# Patient Record
Sex: MALE | Race: WHITE | NOT HISPANIC OR LATINO | Employment: FULL TIME | URBAN - METROPOLITAN AREA
[De-identification: names, ages, dates, MRNs, and addresses within clinical notes are randomized per-mention and may not be internally consistent; named-entity substitution may affect disease eponyms.]

---

## 2023-07-22 ENCOUNTER — APPOINTMENT (OUTPATIENT)
Dept: MRI IMAGING | Facility: CLINIC | Age: 48
DRG: 066 | End: 2023-07-22
Attending: EMERGENCY MEDICINE
Payer: COMMERCIAL

## 2023-07-22 ENCOUNTER — HOSPITAL ENCOUNTER (INPATIENT)
Facility: CLINIC | Age: 48
LOS: 1 days | Discharge: HOME | DRG: 066 | End: 2023-07-25
Attending: EMERGENCY MEDICINE | Admitting: HOSPITALIST
Payer: COMMERCIAL

## 2023-07-22 ENCOUNTER — APPOINTMENT (OUTPATIENT)
Dept: CT IMAGING | Facility: CLINIC | Age: 48
DRG: 066 | End: 2023-07-22
Attending: EMERGENCY MEDICINE
Payer: COMMERCIAL

## 2023-07-22 DIAGNOSIS — G45.9 TIA (TRANSIENT ISCHEMIC ATTACK): ICD-10-CM

## 2023-07-22 DIAGNOSIS — I63.9 CEREBROVASCULAR ACCIDENT (CVA), UNSPECIFIED MECHANISM (H): Primary | ICD-10-CM

## 2023-07-22 LAB
ALBUMIN SERPL BCG-MCNC: 4.5 G/DL (ref 3.5–5.2)
ALP SERPL-CCNC: 94 U/L (ref 40–129)
ALT SERPL W P-5'-P-CCNC: 33 U/L (ref 0–70)
ANION GAP SERPL CALCULATED.3IONS-SCNC: 13 MMOL/L (ref 7–15)
APTT PPP: 26 SECONDS (ref 22–38)
AST SERPL W P-5'-P-CCNC: 23 U/L (ref 0–45)
ATRIAL RATE - MUSE: 96 BPM
BASOPHILS # BLD AUTO: 0.1 10E3/UL (ref 0–0.2)
BASOPHILS NFR BLD AUTO: 1 %
BILIRUB SERPL-MCNC: 0.4 MG/DL
BUN SERPL-MCNC: 8.1 MG/DL (ref 6–20)
CALCIUM SERPL-MCNC: 9.8 MG/DL (ref 8.6–10)
CHLORIDE SERPL-SCNC: 105 MMOL/L (ref 98–107)
CREAT SERPL-MCNC: 1.14 MG/DL (ref 0.67–1.17)
DEPRECATED HCO3 PLAS-SCNC: 25 MMOL/L (ref 22–29)
DIASTOLIC BLOOD PRESSURE - MUSE: NORMAL MMHG
EOSINOPHIL # BLD AUTO: 0.2 10E3/UL (ref 0–0.7)
EOSINOPHIL NFR BLD AUTO: 2 %
ERYTHROCYTE [DISTWIDTH] IN BLOOD BY AUTOMATED COUNT: 13.2 % (ref 10–15)
ETHANOL SERPL-MCNC: <0.01 G/DL
GFR SERPL CREATININE-BSD FRML MDRD: 80 ML/MIN/1.73M2
GLUCOSE SERPL-MCNC: 80 MG/DL (ref 70–99)
HCT VFR BLD AUTO: 45.6 % (ref 40–53)
HGB BLD-MCNC: 15.9 G/DL (ref 13.3–17.7)
HOLD SPECIMEN: NORMAL
HOLD SPECIMEN: NORMAL
IMM GRANULOCYTES # BLD: 0 10E3/UL
IMM GRANULOCYTES NFR BLD: 0 %
INR PPP: 0.96 (ref 0.85–1.15)
INTERPRETATION ECG - MUSE: NORMAL
LYMPHOCYTES # BLD AUTO: 3 10E3/UL (ref 0.8–5.3)
LYMPHOCYTES NFR BLD AUTO: 32 %
MCH RBC QN AUTO: 31.9 PG (ref 26.5–33)
MCHC RBC AUTO-ENTMCNC: 34.9 G/DL (ref 31.5–36.5)
MCV RBC AUTO: 91 FL (ref 78–100)
MONOCYTES # BLD AUTO: 0.8 10E3/UL (ref 0–1.3)
MONOCYTES NFR BLD AUTO: 9 %
NEUTROPHILS # BLD AUTO: 5.2 10E3/UL (ref 1.6–8.3)
NEUTROPHILS NFR BLD AUTO: 56 %
NRBC # BLD AUTO: 0 10E3/UL
NRBC BLD AUTO-RTO: 0 /100
P AXIS - MUSE: 51 DEGREES
PLATELET # BLD AUTO: 281 10E3/UL (ref 150–450)
POTASSIUM SERPL-SCNC: 4 MMOL/L (ref 3.4–5.3)
PR INTERVAL - MUSE: 178 MS
PROT SERPL-MCNC: 6.9 G/DL (ref 6.4–8.3)
QRS DURATION - MUSE: 114 MS
QT - MUSE: 354 MS
QTC - MUSE: 447 MS
R AXIS - MUSE: -73 DEGREES
RBC # BLD AUTO: 4.99 10E6/UL (ref 4.4–5.9)
SODIUM SERPL-SCNC: 143 MMOL/L (ref 136–145)
SYSTOLIC BLOOD PRESSURE - MUSE: NORMAL MMHG
T AXIS - MUSE: 52 DEGREES
TROPONIN T SERPL HS-MCNC: <6 NG/L
TSH SERPL DL<=0.005 MIU/L-ACNC: 2.32 UIU/ML (ref 0.3–4.2)
VENTRICULAR RATE- MUSE: 96 BPM
WBC # BLD AUTO: 9.3 10E3/UL (ref 4–11)

## 2023-07-22 PROCEDURE — 70496 CT ANGIOGRAPHY HEAD: CPT

## 2023-07-22 PROCEDURE — G0378 HOSPITAL OBSERVATION PER HR: HCPCS

## 2023-07-22 PROCEDURE — 84443 ASSAY THYROID STIM HORMONE: CPT | Performed by: HOSPITALIST

## 2023-07-22 PROCEDURE — 83036 HEMOGLOBIN GLYCOSYLATED A1C: CPT | Performed by: HOSPITALIST

## 2023-07-22 PROCEDURE — 99207 PR NO BILLABLE SERVICE THIS VISIT: CPT | Performed by: INTERNAL MEDICINE

## 2023-07-22 PROCEDURE — A9585 GADOBUTROL INJECTION: HCPCS | Performed by: EMERGENCY MEDICINE

## 2023-07-22 PROCEDURE — 0042T CT HEAD PERFUSION W CONTRAST: CPT

## 2023-07-22 PROCEDURE — 82077 ASSAY SPEC XCP UR&BREATH IA: CPT | Performed by: EMERGENCY MEDICINE

## 2023-07-22 PROCEDURE — 70553 MRI BRAIN STEM W/O & W/DYE: CPT

## 2023-07-22 PROCEDURE — 250N000009 HC RX 250: Performed by: EMERGENCY MEDICINE

## 2023-07-22 PROCEDURE — 255N000002 HC RX 255 OP 636: Performed by: EMERGENCY MEDICINE

## 2023-07-22 PROCEDURE — 85025 COMPLETE CBC W/AUTO DIFF WBC: CPT | Performed by: EMERGENCY MEDICINE

## 2023-07-22 PROCEDURE — 36415 COLL VENOUS BLD VENIPUNCTURE: CPT | Performed by: EMERGENCY MEDICINE

## 2023-07-22 PROCEDURE — 99285 EMERGENCY DEPT VISIT HI MDM: CPT | Mod: 25

## 2023-07-22 PROCEDURE — 250N000011 HC RX IP 250 OP 636: Performed by: EMERGENCY MEDICINE

## 2023-07-22 PROCEDURE — 80061 LIPID PANEL: CPT | Performed by: HOSPITALIST

## 2023-07-22 PROCEDURE — 85730 THROMBOPLASTIN TIME PARTIAL: CPT | Performed by: EMERGENCY MEDICINE

## 2023-07-22 PROCEDURE — 80053 COMPREHEN METABOLIC PANEL: CPT | Performed by: EMERGENCY MEDICINE

## 2023-07-22 PROCEDURE — 70450 CT HEAD/BRAIN W/O DYE: CPT

## 2023-07-22 PROCEDURE — 99223 1ST HOSP IP/OBS HIGH 75: CPT | Performed by: HOSPITALIST

## 2023-07-22 PROCEDURE — 70498 CT ANGIOGRAPHY NECK: CPT

## 2023-07-22 PROCEDURE — 84484 ASSAY OF TROPONIN QUANT: CPT | Performed by: EMERGENCY MEDICINE

## 2023-07-22 PROCEDURE — 85610 PROTHROMBIN TIME: CPT | Performed by: EMERGENCY MEDICINE

## 2023-07-22 RX ORDER — ATORVASTATIN CALCIUM 40 MG/1
40 TABLET, FILM COATED ORAL EVERY EVENING
Status: DISCONTINUED | OUTPATIENT
Start: 2023-07-22 | End: 2023-07-22

## 2023-07-22 RX ORDER — ASPIRIN 81 MG/1
81 TABLET ORAL DAILY
Status: DISCONTINUED | OUTPATIENT
Start: 2023-07-22 | End: 2023-07-22

## 2023-07-22 RX ORDER — IOPAMIDOL 755 MG/ML
100 INJECTION, SOLUTION INTRAVASCULAR ONCE
Status: COMPLETED | OUTPATIENT
Start: 2023-07-22 | End: 2023-07-22

## 2023-07-22 RX ORDER — GADOBUTROL 604.72 MG/ML
8 INJECTION INTRAVENOUS ONCE
Status: COMPLETED | OUTPATIENT
Start: 2023-07-22 | End: 2023-07-22

## 2023-07-22 RX ORDER — ASPIRIN 81 MG/1
81 TABLET, CHEWABLE ORAL DAILY
Status: DISCONTINUED | OUTPATIENT
Start: 2023-07-22 | End: 2023-07-25 | Stop reason: HOSPADM

## 2023-07-22 RX ORDER — ATORVASTATIN CALCIUM 40 MG/1
40 TABLET, FILM COATED ORAL EVERY EVENING
Status: DISCONTINUED | OUTPATIENT
Start: 2023-07-22 | End: 2023-07-25 | Stop reason: HOSPADM

## 2023-07-22 RX ORDER — CLOPIDOGREL BISULFATE 75 MG/1
75 TABLET ORAL DAILY
Status: DISCONTINUED | OUTPATIENT
Start: 2023-07-23 | End: 2023-07-25 | Stop reason: HOSPADM

## 2023-07-22 RX ORDER — CLOPIDOGREL 300 MG/1
300 TABLET, FILM COATED ORAL ONCE
Status: COMPLETED | OUTPATIENT
Start: 2023-07-22 | End: 2023-07-23

## 2023-07-22 RX ADMIN — SODIUM CHLORIDE 100 ML: 9 INJECTION, SOLUTION INTRAVENOUS at 19:28

## 2023-07-22 RX ADMIN — IOPAMIDOL 100 ML: 755 INJECTION, SOLUTION INTRAVENOUS at 19:27

## 2023-07-22 RX ADMIN — GADOBUTROL 8 ML: 604.72 INJECTION INTRAVENOUS at 21:46

## 2023-07-22 ASSESSMENT — ACTIVITIES OF DAILY LIVING (ADL)
ADLS_ACUITY_SCORE: 33
ADLS_ACUITY_SCORE: 35
ADLS_ACUITY_SCORE: 35

## 2023-07-22 NOTE — ED TRIAGE NOTES
Patient presents with complaints of intermittent right hand numbness and word finding difficulty that started around 1500 today. Patient is not currently having any symptoms at the time of this note. Patient is a daily smoker and uses alcohol 3-4 days per week. Patient has no focal neurological deficits on initial assessment.      Triage Assessment     Row Name 07/22/23 8410       Triage Assessment (Adult)    Airway WDL WDL       Respiratory WDL    Respiratory WDL WDL       Skin Circulation/Temperature WDL    Skin Circulation/Temperature WDL WDL       Cardiac WDL    Cardiac WDL WDL       Peripheral/Neurovascular WDL    Peripheral Neurovascular WDL WDL       Cognitive/Neuro/Behavioral WDL    Cognitive/Neuro/Behavioral WDL WDL

## 2023-07-23 LAB
CHOLEST SERPL-MCNC: 195 MG/DL
FACTOR 2 INTERPRETATION: NORMAL
FACTOR V INTERPRETATION: NORMAL
GLUCOSE BLDC GLUCOMTR-MCNC: 116 MG/DL (ref 70–99)
GLUCOSE BLDC GLUCOMTR-MCNC: 92 MG/DL (ref 70–99)
GLUCOSE BLDC GLUCOMTR-MCNC: 96 MG/DL (ref 70–99)
HBA1C MFR BLD: 5.1 %
HDLC SERPL-MCNC: 41 MG/DL
LAB DIRECTOR COMMENTS: NORMAL
LAB DIRECTOR DISCLAIMER: NORMAL
LAB DIRECTOR INTERPRETATION: NORMAL
LAB DIRECTOR METHODOLOGY: NORMAL
LAB DIRECTOR RESULTS: NORMAL
LDLC SERPL CALC-MCNC: 103 MG/DL
NONHDLC SERPL-MCNC: 154 MG/DL
SPECIMEN DESCRIPTION: NORMAL
TRIGL SERPL-MCNC: 256 MG/DL
TROPONIN T SERPL HS-MCNC: <6 NG/L

## 2023-07-23 PROCEDURE — G0378 HOSPITAL OBSERVATION PER HR: HCPCS

## 2023-07-23 PROCEDURE — 250N000013 HC RX MED GY IP 250 OP 250 PS 637: Performed by: HOSPITALIST

## 2023-07-23 PROCEDURE — 36415 COLL VENOUS BLD VENIPUNCTURE: CPT | Performed by: PHYSICIAN ASSISTANT

## 2023-07-23 PROCEDURE — 36415 COLL VENOUS BLD VENIPUNCTURE: CPT | Performed by: HOSPITALIST

## 2023-07-23 PROCEDURE — 250N000013 HC RX MED GY IP 250 OP 250 PS 637: Performed by: EMERGENCY MEDICINE

## 2023-07-23 PROCEDURE — 99222 1ST HOSP IP/OBS MODERATE 55: CPT | Performed by: PHYSICIAN ASSISTANT

## 2023-07-23 PROCEDURE — 86147 CARDIOLIPIN ANTIBODY EA IG: CPT | Performed by: PHYSICIAN ASSISTANT

## 2023-07-23 PROCEDURE — 85300 ANTITHROMBIN III ACTIVITY: CPT | Performed by: PHYSICIAN ASSISTANT

## 2023-07-23 PROCEDURE — 86146 BETA-2 GLYCOPROTEIN ANTIBODY: CPT | Performed by: PHYSICIAN ASSISTANT

## 2023-07-23 PROCEDURE — 85730 THROMBOPLASTIN TIME PARTIAL: CPT | Performed by: PHYSICIAN ASSISTANT

## 2023-07-23 PROCEDURE — 999N000111 HC STATISTIC OT IP EVAL DEFER

## 2023-07-23 PROCEDURE — G0452 MOLECULAR PATHOLOGY INTERPR: HCPCS | Mod: 26 | Performed by: PATHOLOGY

## 2023-07-23 PROCEDURE — 85306 CLOT INHIBIT PROT S FREE: CPT | Performed by: PHYSICIAN ASSISTANT

## 2023-07-23 PROCEDURE — 84484 ASSAY OF TROPONIN QUANT: CPT | Performed by: HOSPITALIST

## 2023-07-23 PROCEDURE — 85390 FIBRINOLYSINS SCREEN I&R: CPT | Mod: 26 | Performed by: PATHOLOGY

## 2023-07-23 PROCEDURE — 999N000226 HC STATISTIC SLP IP EVAL DEFER

## 2023-07-23 PROCEDURE — 85303 CLOT INHIBIT PROT C ACTIVITY: CPT | Performed by: PHYSICIAN ASSISTANT

## 2023-07-23 PROCEDURE — 82962 GLUCOSE BLOOD TEST: CPT

## 2023-07-23 PROCEDURE — 81240 F2 GENE: CPT | Performed by: PHYSICIAN ASSISTANT

## 2023-07-23 RX ORDER — LIDOCAINE 40 MG/G
CREAM TOPICAL
Status: DISCONTINUED | OUTPATIENT
Start: 2023-07-23 | End: 2023-07-25 | Stop reason: HOSPADM

## 2023-07-23 RX ADMIN — ATORVASTATIN CALCIUM 40 MG: 40 TABLET, FILM COATED ORAL at 00:08

## 2023-07-23 RX ADMIN — CLOPIDOGREL BISULFATE 75 MG: 75 TABLET ORAL at 09:07

## 2023-07-23 RX ADMIN — ASPIRIN 81 MG 81 MG: 81 TABLET ORAL at 09:07

## 2023-07-23 RX ADMIN — ASPIRIN 81 MG 81 MG: 81 TABLET ORAL at 00:08

## 2023-07-23 RX ADMIN — ATORVASTATIN CALCIUM 40 MG: 40 TABLET, FILM COATED ORAL at 19:47

## 2023-07-23 RX ADMIN — CLOPIDOGREL BISULFATE 300 MG: 300 TABLET, FILM COATED ORAL at 00:07

## 2023-07-23 ASSESSMENT — ACTIVITIES OF DAILY LIVING (ADL)
ADLS_ACUITY_SCORE: 31

## 2023-07-23 NOTE — PLAN OF CARE
"SLP: Orders received. Chart reviewed and discussed with care team.  SLP not indicated as pt has passed a nurse swallow screen and he reports feeling \"mostly\" back to normal in regards to language. During our conversation he does not present with aphasia or dysarthria. However, he admits the language demands here haven't been high.     We discussed recommendation for OP SLP consult should pt identify further language concerns upon discharge and he is agreeable to this plan. No need for inpatient SLP evaluation.  Defer discharge recommendations to medical providers.  Will complete orders.      "

## 2023-07-23 NOTE — CONSULTS
[facility]    Stroke Consult Note    Reason for Consult:  stroke    Chief Complaint: Stroke Symptoms       HPI  Mundo Kern is a 48 year old male with PMH tobacco use, has not seen a doctor in >10 years, presenting with R hand numbness and clumsiness onset 1400 yesterday afternoon. Also noted a brief episode of loss of balance and dizziness. Symtpoms had resolved by the time of evaluation in the ED. Not a TNK candidate given resolution of symptoms and presentation outside the conventional treatment window.     He denies similar symptoms in the past. States the RUE feels back to normal, denies any involvement of other extremities or speech changes.    Stroke Evaluation Summarized    MRI/Head CT MRI: acute/early subacute small L MCA territory infarcts   Intracranial Vasculature CTA head: no LVO, no significant stenosis   Cervical Vasculature CTA neck: no significant stenosis     Echocardiogram pending   EKG/Telemetry SR   Other Testing CTP: Symmetric cerebral blood flow and volume to the cerebral hemispheres bilaterally but there is a mild delay in mean transit time to the posterior left frontal lobe.     LDL  7/22/2023: 103 mg/dL   A1C  7/22/2023: 5.1 %   Troponin 7/23/2023: <6 ng/L       Impression  Scattered acute ischemic strokes of L MCA territory due to embolic stroke of undetermined source (ESUS)      Recommendations   - Neurochecks and Vital Signs every 4 hours   - Permissive HTN; goal SBP < 220 mmHg  - Long term outpatient BP Goal <130/80  - DAPT x21 days with daily aspirin 81 mg + Plavix 75 mg followed by aspirin 325 mg daily monotherapy for secondary stroke prevention  - Statin: atorvastatin 40 mg, titrated to LDL goal 40-70  - TTE with Bubble Study>pending results may need MARIA TERESA  - Telemetry, EKG  - discharge with 30 day cardiac monitor to eval for atrial fibrillation (ordered)  - Hypercoagulable labs ordered given young age  - Bedside Glucose Monitoring  - Nutrition: per  "nursing  - PT/OT/SLP  - Stroke Education  - Stroke Class per [facility]  - Smoking Cessation discussed - he states he is determined to quit  - Depression Screen  - Euthermia, Euglycemia      Patient Follow-up    - in the next 1-2 week(s) with PCP - needs to establish care  - in 6-8 weeks with general neurology ([phone]) (ordered)    Thank you for this consult. Will continue to follow.    Shayy David PA-C  Vascular Neurology    To page me or covering stroke neurology team member, click here: [facility]  Choose \"On Call\" tab at top, then select \"NEUROLOGY/ALL SITES\" from middle drop-down box, press Enter, then look for \"stroke\" or \"telestroke\" for your site.    _____________________________________________________    Clinically Significant Risk Factors Present on Admission                       # Overweight: Estimated body mass index is 25.1 kg/m  as calculated from the following:    Height as of this encounter: 1.803 m (5' 11\").    Weight as of this encounter: 81.6 kg (180 lb).         Past Medical History    No past medical history on file.  Medications   Home Meds  Prior to Admission medications    Not on File       Scheduled Meds    aspirin  81 mg Oral Daily     atorvastatin  40 mg Oral QPM     clopidogrel  75 mg Oral Daily     sodium chloride (PF)  3 mL Intracatheter Q8H       Infusion Meds    - MEDICATION INSTRUCTIONS -       - MEDICATION INSTRUCTIONS -         Allergies   No Known Allergies       PHYSICAL EXAMINATION   Temp:  [97.7  F (36.5  C)-98.2  F (36.8  C)] 97.8  F (36.6  C)  Pulse:  [70-95] 74  Resp:  [14-25] 16  BP: (120-148)/() 120/88  SpO2:  [95 %-98 %] 96 %    General Exam  General:  Patient sitting at the edge of the bed without any acute distress    HEENT:  normocephalic/atraumatic  Pulmonary:  no respiratory distress    Neuro Exam  Mental Status:  alert, oriented x 3, follows commands, speech clear and fluent, naming and repetition normal  Cranial Nerves:  visual fields " intact, PERRL, EOMI with normal smooth pursuit, facial sensation intact and symmetric, facial movements symmetric, hearing not formally tested but intact to conversation, palate elevation symmetric and uvula midline, no dysarthria, shoulder shrug strong bilaterally, tongue protrusion midline  Motor:  normal muscle tone and bulk, no abnormal movements, able to move all limbs spontaneously, strength 5/5 throughout upper and lower extremities, no pronator drift  Reflexes:  toes down-going  Sensory:  light touch sensation intact and symmetric throughout upper and lower extremities, no extinction on double simultaneous stimulation   Coordination:  normal finger-to-nose and heel-to-shin bilaterally without dysmetria, rapid alternating movements symmetric  Station/Gait:  deferred    Stroke Scales    NIHSS  1a. Level of Consciousness 0-->Alert, keenly responsive   1b. LOC Questions 0-->Answers both questions correctly   1c. LOC Commands 0-->Performs both tasks correctly   2.   Best Gaze 0-->Normal   3.   Visual 0-->No visual loss   4.   Facial Palsy 0-->Normal symmetrical movements   5a. Motor Arm, Left 0-->No drift, limb holds 90 (or 45) degrees for full 10 secs   5b. Motor Arm, Right 0-->No drift, limb holds 90 (or 45) degrees for full 10 secs   6a. Motor Leg, Left 0-->No drift, leg holds 30 degree position for full 5 secs   6b. Motor Leg, right 0-->No drift, leg holds 30 degree position for full 5 secs   7.   Limb Ataxia 0-->Absent   8.   Sensory 0-->Normal, no sensory loss   9.   Best Language 0-->No aphasia, normal   10. Dysarthria 0-->Normal   11. Extinction and Inattention  0-->No abnormality   Total 0 (07/23/23 1155)       Imaging  I personally reviewed all imaging; relevant findings per HPI.    Labs Data   CBC  Recent Labs   Lab 07/22/23 1914   WBC 9.3   RBC 4.99   HGB 15.9   HCT 45.6        Basic Metabolic Panel   Recent Labs   Lab 07/22/23 1914      POTASSIUM 4.0   CHLORIDE 105   CO2 25   BUN 8.1    CR 1.14   GLC 80   SOFI 9.8     Liver Panel  Recent Labs   Lab 07/22/23 1914   PROTTOTAL 6.9   ALBUMIN 4.5   BILITOTAL 0.4   ALKPHOS 94   AST 23   ALT 33     INR    Recent Labs   Lab Test 07/22/23 1914   INR 0.96           Stroke Consult Data Data   This was a non-emergent, non-telestroke consult.  I have personally spent a total of 60 minutes providing care today, time spent in reviewing medical records and reviewing tests, examining the patient and obtaining history, coordination of care, and discussion with the patient and/or family regarding diagnostic results, prognosis, symptom management, risks and benefits of management options, and development of plan of care. Greater than 50% was spent in counseling and coordination of care.

## 2023-07-23 NOTE — CONSULTS
"[facility]    Stroke Consult Note    Reason for Consult: Stroke Code     Chief Complaint: Stroke Symptoms      HPI  Mundo Kern is a 48 year old male with no prior medical problems coming with right hand weakness that started at 1400. He reports that that his right hand felt strange. He also had some speech problems- expressing how he felt etc. Symptoms have resolved in the ED.    He does report smoking a pack a day.    Imaging Findings  CT head- no acute IC changes.   CTA head and neck- no LVO  CTP- left m3 division perfusion defect.    Intravenous Thrombolysis  Not given due to:   - minor/isolated/quickly resolving symptoms    Endovascular Treatment  Not initiated due to absence of proximal vessel occlusion    Impression   Acute ischemic stroke of left MCA territory due to undetermined etiology     Recommendations  - Use orderset: \"Ischemic Stroke Routine Admission\" or \"Ischemic Stroke No Thrombolytics/No Thrombectomy ICU Admission\"  - Neurochecks and Vital Signs every 4 hours   - Permissive HTN; goal SBP < 220 mmHg  - Daily aspirin 81 mg for secondary stroke prevention  - Clopidogrel load 300 mg followed by 75 mg daily.  - Statin: atorvastatin 40 mg  - MRI Brain with and without contrast  - Telemetry, EKG  - Bedside Glucose Monitoring  - A1c, Lipid Panel, Troponin x 3  - PT/OT/SLP  - Stroke Education  - Euthermia, Euglycemia    Patient Follow-up     - final recommendation pending work-up    Thank you for this consult. We will continue to follow.      Alex Altamirano MD  ______________________________________________________     Past Medical History   No past medical history on file.  Past Surgical History   No past surgical history on file.  Medications   Home Meds  Prior to Admission medications    Not on File       Scheduled Meds      Infusion Meds      PRN Meds      Allergies   No Known Allergies  Family History   No family history on file.  Social History        Review of " Systems   The 10 point Review of Systems is negative other than noted in the HPI or here.        PHYSICAL EXAMINATION  Temp:  [98.1  F (36.7  C)] 98.1  F (36.7  C)  Pulse:  [95] 95  Resp:  [18] 18  BP: (142)/(99) 142/99  SpO2:  [97 %] 97 %     General Exam  General:  patient lying in bed without any acute distress    HEENT:  normocephalic/atraumatic  Cardio:  RRR  Pulmonary:  no respiratory distress  Abdomen:  non-distended  Extremities:  pitting edema present  Skin:  intact     Neuro Exam  Mental Status:  alert, oriented x 3, follows commands, speech clear and fluent, naming and repetition normal  Cranial Nerves:  visual fields intact (tested by nurse), EOMI with normal smooth pursuit, facial sensation intact and symmetric (tested by nurse), facial movements symmetric, hearing not formally tested but intact to conversation, no dysarthria, shoulder shrug equal bilaterally, tongue protrusion midline  Motor:  no abnormal movements, able to move all limbs antigravity spontaneously with no signs of hemiparesis observed, no pronator drift   Reflexes:  unable to test (telestroke)  Sensory:  light touch sensation intact and symmetric throughout upper and lower extremities (assessed by nurse)  Coordination:  normal finger-to-nose and heel-to-shin bilaterally without dysmetria, rapid alternating movements symmetric  Station/Gait:  unable to test due to telestroke    Dysphagia Screen  Per Nursing    Stroke Scales    NIHSS  1a. Level of Consciousness 0-->Alert, keenly responsive   1b. LOC Questions 0-->Answers both questions correctly   1c. LOC Commands 0-->Performs both tasks correctly   2.   Best Gaze 0-->Normal   3.   Visual 0-->No visual loss   4.   Facial Palsy 0-->Normal symmetrical movements   5a. Motor Arm, Left 0-->No drift, limb holds 90 (or 45) degrees for full 10 secs   5b. Motor Arm, Right 0-->No drift, limb holds 90 (or 45) degrees for full 10 secs   6a. Motor Leg, Left 0-->No drift, leg holds 30 degree position  for full 5 secs   6b. Motor Leg, right 0-->No drift, leg holds 30 degree position for full 5 secs   7.   Limb Ataxia 0-->Absent   8.   Sensory 0-->Normal, no sensory loss   9.   Best Language 0-->No aphasia, normal   10. Dysarthria 0-->Normal   11. Extinction and Inattention  0-->No abnormality   Total 0 (07/22/23 2003)       Modified Tess Score (Pre-morbid)  0 - No symptoms.    Imaging  I personally reviewed all imaging; relevant findings per HPI.     Lab Results Data   CBC  Recent Labs   Lab 07/22/23 1914   WBC 9.3   RBC 4.99   HGB 15.9   HCT 45.6        Basic Metabolic Panel    Recent Labs   Lab 07/22/23 1914      POTASSIUM 4.0   CHLORIDE 105   CO2 25   BUN 8.1   CR 1.14   GLC 80   SOFI 9.8     Liver Panel  Recent Labs   Lab 07/22/23 1914   PROTTOTAL 6.9   ALBUMIN 4.5   BILITOTAL 0.4   ALKPHOS 94   AST 23   ALT 33     INR    Recent Labs   Lab Test 07/22/23 1914   INR 0.96      Lipid Profile  No lab results found.  A1C  No lab results found.  Troponin    Recent Labs   Lab 07/22/23 1914   CTROPT <6          Stroke Code Data Data   Stroke Code Data  (for stroke code with tele)  Stroke code activated 07/22/23   1849   First stroke provider response 07/22/23   1920   Video start time 07/22/23   1930   Video end time 07/22/23 2020   Last known normal 07/22/23   1400   Time of discovery  (or onset of symptoms)  07/22/23   1400   Head CT read by Stroke Neuro Dr/Provider 07/22/23   1950   Was stroke code de-escalated? Yes 07/22/23 2007           Telestroke Service Details  Type of service telemedicine diagnostic assessment of acute neurological changes   Reason telemedicine is appropriate patient requires assessment with a specialist for diagnosis and treatment of neurological symptoms   Mode of transmission secure interactive audio and video communication per [facility]   Originating site (patient location) [facility]    Distant site (provider location) Provider remote site        I have personally spent a total of 60 minutes providing care today, time spent in reviewing medical records and reviewing tests, examining the patient and obtaining history, coordination of care, and discussion with the patient and/or family regarding diagnostic results, prognosis, symptom management, risks and benefits of management options, and development of plan of care. Greater than 50% was spent in counseling and coordination of care.

## 2023-07-23 NOTE — PROGRESS NOTES
[facility]    Medicine Progress Note - Hospitalist Service    Date of Admission:  7/22/2023    Assessment & Plan   Mundo Kern  Is a 48 year old male without any history of prior medical problem presented to the emergency department with right hand weakness, lightheadedness and difficulty finding words.  Reportedly started on July 22, 2023 around 2 PM, while patient reportedly was playing a video game.  After he presented to the emergency department, the symptoms reportedly had resolved.    Acute ischemic stroke  Hypertension  -Per MRI brain  FINDINGS:  INTRACRANIAL CONTENTS: Small acute left MCA territory infarct with involvement of the postcentral gyrus. There late acute infarcts in the left MCA distribution involving the left middle frontal gyrus and posterior insula. No mass, acute hemorrhage, or   extra-axial fluid collections.  No hydrocephalus. Normal position of the cerebellar tonsils. No pathologic contrast enhancement  -Noted hypertension 137/97 with heart rate of 67.    - Neurology consult done   -Permissive hypertension goal systolic blood pressure less than 220 mmHg  -Atorvastatin daily started 40 mg daily LDL goal 40-70  -Long-term goal for blood pressure is SBP< 130 and DBBP<  80  -DAPT x21 days with daily aspirin 81 mg plus Plavix 75 mg followed by aspirin 325 mg daily for prophylaxis  -Continue on telemetry/EKG  -Echocardiogram ordered not yet done  -Reviewed laboratory data including TSH, A1c, troponin, alcohol ethyl, aPTT, INR, comprehensive metabolic profile and CBC all negative.  -PT/ OT/SP consult done  -Patient is totally independent and cleared.  -Discharge once he is cleared by neurologist.  -Awaiting for echocardiogram to be done.  -Hospital medicine service will continue to follow-up.    Alcohol use  Nicotine dependency  No primary doctor  -History of a pack a day cigarette smoking  -Drinks 3-4 times a week in the amount of 4-6 beer each time  -Patient is  "advised to make lifestyle modification  -To establish primary doctor.  -Offered  for assistant, but patient declined and he will take care of himself next week.  -Education provided regarding high cholesterol and risk for stroke and heart attack.  -Encouraged him to take medication as prescribed.  -Patient understood and agreed with the plan.         Diet: Combination Diet Regular Diet    DVT Prophylaxis: Low Risk/Ambulatory with no VTE prophylaxis indicated  Velazquez Catheter: Not present  Lines: None     Cardiac Monitoring: ACTIVE order. Indication: Stroke, acute (48 hours)  Code Status: Full Code      Clinically Significant Risk Factors Present on Admission   Alcohol use unspecified  Tobacco dependency  No physical for the past 10 years                    # Overweight: Estimated body mass index is 25.1 kg/m  as calculated from the following:    Height as of this encounter: 1.803 m (5' 11\").    Weight as of this encounter: 81.6 kg (180 lb).            Disposition Plan Awaiting echocardiogram and neurologist clearance.  Most likely tomorrow or later today.     Expected Discharge Date: 07/24/2023                The patient's care was discussed with the Attending Physician, Dr. Sinha, Bedside Nurse, Patient and Patient's Family.    HOWARD Garcia [facility]  Hospitalist Service  [facility]  Securely message with [employer] (more info)  Text page via [employer] Paging/Directory   ______________________________________________________________________    Interval History   Mundo Kern  Is a 48 year old male without any history of prior medical problem presented to the emergency department with right hand weakness, lightheadedness and difficulty finding words.  Reportedly started on July 22, 2023 around 2 PM, while patient reportedly was playing a video game.  After he presented to the emergency department, the symptoms reportedly had resolved.  Patient seen today for medical management " follow-up.  Seen patient sitting in a regular chair with his mother by the bedside.  He appears to be in no acute distress and able to communicate and express his wishes.  Patient tells me that all the symptoms that he presented with has resolved.  He denies any headache, visual disturbance, nausea, vomiting or generalized weakness.  He is aware of the symptom he showed and the MRI results for acute ischemic stroke.  Patient states that he will do what ever it takes to get better including exercising, smoking cessation as well as cutting on drinking.  Patient states that he has insurance and he will establish a primary doctor on his own.  He declined social work assistance.      Physical Exam   Vital Signs: Temp: 97.9  F (36.6  C) Temp src: Oral BP: (!) 137/97 Pulse: 67   Resp: 16 SpO2: 98 % O2 Device: None (Room air)    Weight: 180 lbs 0 oz    Constitutional: awake, alert, cooperative, no apparent distress, and appears stated age  Eyes: lids and lashes normal, pupils equal, round and reactive to light, extra-ocular muscles intact, sclera clear and conjunctiva normal  ENT: normocepalic, without obvious abnormality  Respiratory: No increased work of breathing, good air exchange, clear to auscultation bilaterally, no crackles or wheezing  Cardiovascular: Normal apical impulse, regular rate and rhythm, normal S1 and S2, no S3 or S4, and no murmur noted  GI: Bowel sound positive nontender nondistended.  Skin: No acute rash noted on exposed skin.  Warm and dry.  Musculoskeletal: No extremity edema noted.  Intact range of motion.  Neurologic: Mental Status Exam:  Level of Alertness:   awake  Orientation:   person, place, time  Cranial Nerves:  VII: Facial strength: intact  Coordination:  Finger/Nose:  Right:  normal  Left:  normal    Medical Decision Making     25 MINUTES SPENT BY ME on the date of service doing chart review, history, exam, documentation & further activities per the note.      Data     I have personally  reviewed the following data over the past 24 hrs:    9.3  \   15.9   / 281     143 105 8.1 /  96   4.0 25 1.14 \       ALT: 33 AST: 23 AP: 94 TBILI: 0.4   ALB: 4.5 TOT PROTEIN: 6.9 LIPASE: N/A       Trop: <6 BNP: N/A       TSH: 2.32 T4: N/A A1C: 5.1       INR:  0.96 PTT:  26   D-dimer:  N/A Fibrinogen:  N/A       Imaging results reviewed over the past 24 hrs:   Recent Results (from the past 24 hour(s))   CT Head w/o Contrast    Narrative    EXAM: CT HEAD W/O CONTRAST, CT HEAD PERFUSION W CONTRAST, CTA HEAD NECK W CONTRAST  LOCATION: [facility]  DATE: 7/22/2023    INDICATION: Right-sided numbness.  COMPARISON: None.  TECHNIQUE: Head and neck CT angiogram with IV contrast. Noncontrast head CT followed by axial helical CT images of the head and neck vessels obtained during the arterial phase of intravenous contrast administration. Axial 2D reconstructed images and   multiplanar 3D MIP reconstructed images of the head and neck vessels were performed by the technologist. Additional CT cerebral perfusion was performed utilizing a second contrast bolus. Perfusion data were post processed with generation of standard   perfusion maps and estimation of ischemic/infarcted volumes utilizing standard threshold values. Dose reduction techniques were used. All stenosis measurements made according to NASCET criteria unless otherwise specified.  CONTRAST: 50mL Isovue 370     (accession [id number]), 75mL Isovue 370     (accession [id number])    FINDINGS:   NONCONTRAST HEAD CT:   INTRACRANIAL CONTENTS: No intracranial hemorrhage, extraaxial collection, or mass effect.  No CT evidence of acute infarct. Normal parenchymal attenuation. Normal ventricles and sulci.     VISUALIZED ORBITS/SINUSES/MASTOIDS: No intraorbital abnormality. No paranasal sinus mucosal disease. No middle ear or mastoid effusion.    BONES/SOFT TISSUES: No acute abnormality.    HEAD CTA:  ANTERIOR CIRCULATION: No stenosis/occlusion, aneurysm,  or high flow vascular malformation. Standard [place] anatomy.    POSTERIOR CIRCULATION: No stenosis/occlusion, aneurysm, or high flow vascular malformation. Balanced vertebral arteries supply a normal basilar artery.     DURAL VENOUS SINUSES: Expected enhancement of the major dural venous sinuses.    NECK CTA:  RIGHT CAROTID: No measurable stenosis or dissection.    LEFT CAROTID: No measurable stenosis or dissection.    VERTEBRAL ARTERIES: No focal stenosis or dissection. Balanced vertebral arteries.    AORTIC ARCH: Classic aortic arch anatomy with no significant stenosis at the origin of the great vessels.    NONVASCULAR STRUCTURES: Unremarkable.    CT PERFUSION:  PERFUSION MAPS: There is a mild delay in transit time to the posterior left frontal lobe but there is fairly symmetric cerebral blood flow and blood volume involving both cerebral hemispheres.    RAPID ANALYSIS:  CBF<30%: 0ml  Tmax>6sec: 9 ml  Mismatch volume: 9 ml  Mismatch ratio: infinite      Impression    IMPRESSION:   HEAD CT:  1.  No CT finding of a mass, hemorrhage or focal area suggestive of acute infarct.    HEAD CTA:   1.  No discrete vessel occlusion, significant stenosis, aneurysm or high flow vascular malformation involving the arteries of the [place].    NECK CTA:  1.  Normal configuration of the great vessels off the aortic arch with no significant stenosis of their origins.  2.  No significant stenosis or irregularity involving the arteries of the neck by NASCET criteria.  3.  No radiographic evidence of dissection.    CT PERFUSION:  1.  Symmetric cerebral blood flow and volume to the cerebral hemispheres bilaterally but there is a mild delay in mean transit time to the posterior left frontal lobe.  2.  Recommend MRI the brain without and with contrast for further evaluation.    Head CT findings were communicated by phone to Dr. Alston at 7:51 PM. CTA findings were communicated to Dr. Alston at 8:07 PM on 07/22/2023.   CTA Head  Neck with Contrast    Narrative    EXAM: CT HEAD W/O CONTRAST, CT HEAD PERFUSION W CONTRAST, CTA HEAD NECK W CONTRAST  LOCATION: [facility]  DATE: 7/22/2023    INDICATION: Right-sided numbness.  COMPARISON: None.  TECHNIQUE: Head and neck CT angiogram with IV contrast. Noncontrast head CT followed by axial helical CT images of the head and neck vessels obtained during the arterial phase of intravenous contrast administration. Axial 2D reconstructed images and   multiplanar 3D MIP reconstructed images of the head and neck vessels were performed by the technologist. Additional CT cerebral perfusion was performed utilizing a second contrast bolus. Perfusion data were post processed with generation of standard   perfusion maps and estimation of ischemic/infarcted volumes utilizing standard threshold values. Dose reduction techniques were used. All stenosis measurements made according to NASCET criteria unless otherwise specified.  CONTRAST: 50mL Isovue 370     (accession [id number]), 75mL Isovue 370     (accession [id number])    FINDINGS:   NONCONTRAST HEAD CT:   INTRACRANIAL CONTENTS: No intracranial hemorrhage, extraaxial collection, or mass effect.  No CT evidence of acute infarct. Normal parenchymal attenuation. Normal ventricles and sulci.     VISUALIZED ORBITS/SINUSES/MASTOIDS: No intraorbital abnormality. No paranasal sinus mucosal disease. No middle ear or mastoid effusion.    BONES/SOFT TISSUES: No acute abnormality.    HEAD CTA:  ANTERIOR CIRCULATION: No stenosis/occlusion, aneurysm, or high flow vascular malformation. Standard [place] anatomy.    POSTERIOR CIRCULATION: No stenosis/occlusion, aneurysm, or high flow vascular malformation. Balanced vertebral arteries supply a normal basilar artery.     DURAL VENOUS SINUSES: Expected enhancement of the major dural venous sinuses.    NECK CTA:  RIGHT CAROTID: No measurable stenosis or dissection.    LEFT CAROTID: No measurable  stenosis or dissection.    VERTEBRAL ARTERIES: No focal stenosis or dissection. Balanced vertebral arteries.    AORTIC ARCH: Classic aortic arch anatomy with no significant stenosis at the origin of the great vessels.    NONVASCULAR STRUCTURES: Unremarkable.    CT PERFUSION:  PERFUSION MAPS: There is a mild delay in transit time to the posterior left frontal lobe but there is fairly symmetric cerebral blood flow and blood volume involving both cerebral hemispheres.    RAPID ANALYSIS:  CBF<30%: 0ml  Tmax>6sec: 9 ml  Mismatch volume: 9 ml  Mismatch ratio: infinite      Impression    IMPRESSION:   HEAD CT:  1.  No CT finding of a mass, hemorrhage or focal area suggestive of acute infarct.    HEAD CTA:   1.  No discrete vessel occlusion, significant stenosis, aneurysm or high flow vascular malformation involving the arteries of the [place].    NECK CTA:  1.  Normal configuration of the great vessels off the aortic arch with no significant stenosis of their origins.  2.  No significant stenosis or irregularity involving the arteries of the neck by NASCET criteria.  3.  No radiographic evidence of dissection.    CT PERFUSION:  1.  Symmetric cerebral blood flow and volume to the cerebral hemispheres bilaterally but there is a mild delay in mean transit time to the posterior left frontal lobe.  2.  Recommend MRI the brain without and with contrast for further evaluation.    Head CT findings were communicated by phone to Dr. Alston at 7:51 PM. CTA findings were communicated to Dr. Alston at 8:07 PM on 07/22/2023.   CT Head Perfusion w Contrast - For Tier 2 Stroke    Narrative    EXAM: CT HEAD W/O CONTRAST, CT HEAD PERFUSION W CONTRAST, CTA HEAD NECK W CONTRAST  LOCATION: [facility]  DATE: 7/22/2023    INDICATION: Right-sided numbness.  COMPARISON: None.  TECHNIQUE: Head and neck CT angiogram with IV contrast. Noncontrast head CT followed by axial helical CT images of the head and neck vessels  obtained during the arterial phase of intravenous contrast administration. Axial 2D reconstructed images and   multiplanar 3D MIP reconstructed images of the head and neck vessels were performed by the technologist. Additional CT cerebral perfusion was performed utilizing a second contrast bolus. Perfusion data were post processed with generation of standard   perfusion maps and estimation of ischemic/infarcted volumes utilizing standard threshold values. Dose reduction techniques were used. All stenosis measurements made according to NASCET criteria unless otherwise specified.  CONTRAST: 50mL Isovue 370     (accession [id number]), 75mL Isovue 370     (accession [id number])    FINDINGS:   NONCONTRAST HEAD CT:   INTRACRANIAL CONTENTS: No intracranial hemorrhage, extraaxial collection, or mass effect.  No CT evidence of acute infarct. Normal parenchymal attenuation. Normal ventricles and sulci.     VISUALIZED ORBITS/SINUSES/MASTOIDS: No intraorbital abnormality. No paranasal sinus mucosal disease. No middle ear or mastoid effusion.    BONES/SOFT TISSUES: No acute abnormality.    HEAD CTA:  ANTERIOR CIRCULATION: No stenosis/occlusion, aneurysm, or high flow vascular malformation. Standard [place] anatomy.    POSTERIOR CIRCULATION: No stenosis/occlusion, aneurysm, or high flow vascular malformation. Balanced vertebral arteries supply a normal basilar artery.     DURAL VENOUS SINUSES: Expected enhancement of the major dural venous sinuses.    NECK CTA:  RIGHT CAROTID: No measurable stenosis or dissection.    LEFT CAROTID: No measurable stenosis or dissection.    VERTEBRAL ARTERIES: No focal stenosis or dissection. Balanced vertebral arteries.    AORTIC ARCH: Classic aortic arch anatomy with no significant stenosis at the origin of the great vessels.    NONVASCULAR STRUCTURES: Unremarkable.    CT PERFUSION:  PERFUSION MAPS: There is a mild delay in transit time to the posterior left frontal lobe but there is fairly  symmetric cerebral blood flow and blood volume involving both cerebral hemispheres.    RAPID ANALYSIS:  CBF<30%: 0ml  Tmax>6sec: 9 ml  Mismatch volume: 9 ml  Mismatch ratio: infinite      Impression    IMPRESSION:   HEAD CT:  1.  No CT finding of a mass, hemorrhage or focal area suggestive of acute infarct.    HEAD CTA:   1.  No discrete vessel occlusion, significant stenosis, aneurysm or high flow vascular malformation involving the arteries of the [place].    NECK CTA:  1.  Normal configuration of the great vessels off the aortic arch with no significant stenosis of their origins.  2.  No significant stenosis or irregularity involving the arteries of the neck by NASCET criteria.  3.  No radiographic evidence of dissection.    CT PERFUSION:  1.  Symmetric cerebral blood flow and volume to the cerebral hemispheres bilaterally but there is a mild delay in mean transit time to the posterior left frontal lobe.  2.  Recommend MRI the brain without and with contrast for further evaluation.    Head CT findings were communicated by phone to Dr. Alston at 7:51 PM. CTA findings were communicated to Dr. Alston at 8:07 PM on 07/22/2023.   MR Brain w/o & w Contrast    Addendum: 7/22/2023    Dr. Drew Ulrich  was contacted by me on 7/22/2023 10:41 PM CDT.      Narrative    EXAM: MR BRAIN W/O and W CONTRAST  LOCATION: [facility]  DATE: 7/22/2023    INDICATION: Developed intermittent right upper extremity clumsiness and numbness with speech difficulty at 2 PM   COMPARISON:  Same day CTA head.  CONTRAST: 8 mL Gadavist   TECHNIQUE: Routine multiplanar multisequence head MRI without and with intravenous contrast.    FINDINGS:  INTRACRANIAL CONTENTS: Small acute left MCA territory infarct with involvement of the postcentral gyrus. There late acute infarcts in the left MCA distribution involving the left middle frontal gyrus and posterior insula. No mass, acute hemorrhage, or   extra-axial fluid  collections.  No hydrocephalus. Normal position of the cerebellar tonsils. No pathologic contrast enhancement.    SELLA: No abnormality accounting for technique.    OSSEOUS STRUCTURES/SOFT TISSUES: Normal marrow signal. The major intracranial vascular flow voids are maintained.     ORBITS: No abnormality accounting for technique.     SINUSES/MASTOIDS: No paranasal sinus mucosal disease. No middle ear or mastoid effusion.       Impression    IMPRESSION:  1.  Small acute and late acute left MCA territory infarcts.  2.  No hemorrhagic conversion or significant mass effect.

## 2023-07-23 NOTE — PLAN OF CARE
Up independent in room. A&O x 4. VSS, O2 stable on RA. Neuros intact. Denies pain. Tolerating current diet. Mother at bedside throughout shift. Awaiting ECHO. Patient to discharge with cardiac event monitor. Discharge possible tomorrow pending labs and ECHO.

## 2023-07-23 NOTE — H&P
[facility]    History and Physical  Hospitalist    Mundo Kern MRN# [id number]   Age: 48 year old YOB: 1975     Date of Admission:  7/22/2023    Primary care provider: No Ref-Primary, Physician          Assessment and Plan:     Mundo Kern is a 48 year old  male with no prior medical illness, not seen medical doctor for more than 10 years now presenting to the ED with right hand strange feeling that started at 2 PM.    Acute ischemic stroke.  Patient reports this afternoon while playing videogames noted sudden onset of right hand numbness, decreased touch sensitivity, clumsiness.  Reports brief episode of losing balance, dizziness that resolved.  --- In ED blood pressure 142/99, heart rate 95.  No focal neurological deficit.  Finger-nose test intact.  No dysarthria  ---CT head no CT finding of mass, hemorrhage or focal area suggestive of infarct.  --CTA Head no discrete vessel occlusion, significant stenosis, aneurysm or high flow vascular malformation involving the arteries of the [place].  --CT Neck normal configuration of the great vessels of the aortic arch with no significant stenosis of their origin.  No radiographic evidence of dissection.  --CT perfusion symmetric cerebral blood flow and volume to the cerebral hemispheres bilaterally but there is a mild delay in mean transit time to the posterior left frontal lobe.  --EKG normal sinus rhythm, left anterior fascicular block.  QTc 447.  --Sodium 143, potassium 4.0 glucose 80.  Enzymes within normal limits.  INR 0.96.  Troponin high-sensitivity less than 6.  Ethyl alcohol level less than 0.01.  WBC 9.3.  Hemoglobin 15.9 platelets 281.  --Admit to observation unit.  Stroke neurology consulted from ED, appreciate input.  Start on aspirin 81 mg oral daily.  Plavix loading 300 mg followed by Plavix 75 mg oral daily per stroke team recommendation.  Start on Lipitor 40 mg oral daily.  Follow lipid panel.  MRI  brain with and without contrast ordered.  Neurochecks every 4 hours.  Telemetry monitoring.  Trend troponins.  Echocardiogram ordered.  Permissive hypertension with goal systolic blood pressure less than 228.  Monitor blood pressures closely  Follow hemoglobin A1c, TSH levels.  PT, OT, speech therapy evaluation.  Stroke education.  Emphasized risk factor modification.    Nicotine dependence.  Reports smokes a pack a day.  Discussed need to consider abstinence from nicotine, seems motivated.  Declined the nicotine replacement therapy.  Smoking cessation inpatient consult    Alcohol use.  Reports drinks for 3-4 times a week, drinks 4-6 beers each time.   Did discuss need to cut back alcohol use.  Seems motivated.    Health maintenance.  Patient reports has not seen in a medical doctor in greater than 10 years.  Discussed need to establish primary care, age-appropriate health maintenance as outpatient.            DVT Prophylaxis: SCDs, ambulate.  Code Status: Full code     Disposition: Expected discharge likely 7/23 pending clinical improvement.   More than 70% of time spent in direct patient care, care coordination, patient counseling, and formalizing plan of care.    Discussed with patient, his mom by the bedside and ED team.     Yuniel Fish MD          Chief Complaint:     History is obtained from patient, his mom by the bedside.    Mundo Kern is a 48 year old  male with no prior medical illness, not seen medical doctor for more than 10 years now presenting to the ED with right hand strange feeling that started at 2 PM.    Patient reports had a stressful week with work, game. This afternoon while playing videogames noted sudden onset of right hand numbness, decreased touch sensitivity, clumsiness.  Reports brief episode of losing balance, dizziness that resolved.  Patient denies any leg numbness, denies any vision difficulty.  Denies any headache.  Denies any bowel or bladder disturbance.  Denies any  incontinence of urine.  Denies any back pain.  He denies any chest pain or palpitations.  Denies any nausea or vomiting.  Denies any trauma, recent chiropractor visit.  Denies any stroke symptoms in the past.  Denies any fever or chills.  Denies any blood in the stools or blood in the urine or coughing or blood.  Denies any recent travel.  Denies any exposure to sick contacts.    Reports smokes a pack a day.  Reports drinks for 3-4 times a week, drinks 4-6 beers each time.     In ED blood pressure 142/99, heart rate 95.  No focal neurological deficit.  Finger-nose test intact.  No dysarthria  EKG normal sinus rhythm, left anterior fascicular block.  QTc 447.  CT head no CT finding of mass, hemorrhage or focal area suggestive of infarct.  CTA Head no discrete vessel occlusion, significant stenosis, aneurysm or high flow vascular malformation involving the arteries of the [place].  CT Neck normal configuration of the great vessels of the aortic arch with no significant stenosis of their origin.  No radiographic evidence of dissection.  CT perfusion symmetric cerebral blood flow and volume to the cerebral hemispheres bilaterally but there is a mild delay in mean transit time to the posterior left frontal lobe.    --Sodium 143, potassium 4.0 glucose 80.  Enzymes within normal limits.  INR 0.96.  Troponin high-sensitivity less than 6.  Ethyl alcohol level less than 0.01.  WBC 9.3.  Hemoglobin 15.9 platelets 281.         Review of Systems:     GENERAL:  no fever or chills  EENT: No new vision changes, no sinus problems, no difficulty swallowing, no hearing difficulty  PULMONARY: No shortness of breath, no cough, no wheezing  CARDIAC: no chest pain, no irregular or fast heart beats   GI: No abdominal pain, nausea, vomiting, diarrhea  : No burning/pain with urination  NEURO: See HPI.  ENDOCRINE: No excessive thirst  MUSCULOSKELETAL: No joint pain  SKIN: No skin rashes  PSYCHIATRY no anxiety     Medical History:      Medical history reviewed.  No history of medical illness, has not seen doctor in > 10 years.    Surgical History:      Surgical history reviewed.  No history of previous surgeries         Social History:      Social History     Tobacco Use     Smoking status: Not on file     Smokeless tobacco: Not on file   Substance Use Topics     Alcohol use: Not on file             Family History:     Family history reviewed, no pertinent family history to [facility].  History of breast cancer in patient's maternal grandmother.  History of pancreatic cancer in father side of the family.         Allergies:   No Known Allergies          Medications:   Home meds reviewed          Physical Exam      Admission Weight: 81.6 kg (180 lb)    Vital Signs with Ranges  Temp:  [98.1  F (36.7  C)] 98.1  F (36.7  C)  Pulse:  [95] 95  Resp:  [18] 18  BP: (142)/(99) 142/99  SpO2:  [97 %] 97 %    PHYSICAL EXAM  GENERAL: Patient is in no distress. Alert and oriented.  HEENT: Oropharynx pink, moist. Pupils equal   Extra ocular muscle movements intact.  HEART: Regular rate and rhythm. S1S2.   LUNGS: Clear to auscultation bilaterally. No expiratory wheeze.  Respirations unlabored  ABDOMEN: Soft, no abdominal tenderness, bowel sounds heard   NEURO: Cranial nerves grossly intact.  Moving all extremities.  Strength 5/5.  No arm drift.  Finger-nose test intact.  Rapid alternating movements symmetric. Touch sensation intact.  EXTREMITIES: No pedal edema.   SKIN: Warm, dry. No rash   PSYCHIATRY Cooperative         Data:   All new lab and imaging data was reviewed.

## 2023-07-23 NOTE — ED PROVIDER NOTES
"  History     Chief Complaint:  Stroke Symptoms       The history is provided by the patient and a parent.      Mundo Kern is a 48 year old male who presents to the ED with stroke symptoms. The patient reports that beginning at 1400 he started to experience right hand numbness, intermittent decrease touch sensitivity, dizziness, clumsiness, trouble forming words, and off-balance. He states these symptoms started when he was playing video games. The mother of the patient reports that she noticed that the patient experiencing right handed weakness and clumsiness. He denies recent leg numbness, vision changes, headache, spine pain, back pain, nausea, symptoms on left side, fall, injury, neck trauma, recent chiropractic adjustment, history of stroke, or this happening before. He states that he is feeling better now. He reports that he is a heavy smoker and drinker but denies drinking today or being in alcohol treatment.     Independent Historian:   Mother - They report supplemental history.    Review of External Notes:   None     Medications:    The patient is not currently taking any prescribed medications.    Past Medical History:    No pertinent past medical history    Physical Exam     Patient Vitals for the past 24 hrs:   BP Temp Temp src Pulse Resp SpO2 Height Weight   07/22/23 1903 (!) 142/99 98.1  F (36.7  C) Temporal 95 18 97 % 1.803 m (5' 11\") 81.6 kg (180 lb)        Physical Exam  SKIN:  Warm, dry.  HEMATOLOGIC/IMMUNOLOGIC/LYMPHATIC:  No pallor.  EYES:  Conjunctivae normal.  Normal extraocular motion.  Pupils equal round react to light.  Visual fields intact.  CARDIOVASCULAR:  Regular rate and rhythm.  No murmur.  No carotid bruit.  RESPIRATORY:  No respiratory distress, breath sounds equal and normal.  GASTROINTESTINAL:  Soft, nontender abdomen.  MUSCULOSKELETAL: Normal body habitus.  NEUROLOGIC:  Alert, conversant.  Oriented to self place and time.  No aphasia.  No dysarthria.  No gross motor or " tactile sensory deficit of the face or extremities.  No limb ataxia.  PSYCHIATRIC:  Normal mood.    Emergency Department Course   ECG  ECG taken at 1911, ECG read at 2013  Normal sinus rhythm   Left anterior fascicular block   Abnormal ECG    Rate 96 bpm. MN interval 178 ms. QRS duration 114 ms. QT/QTc 354/447 ms. P-R-T axes 51 -73 52.     Imaging:  CT Head Perfusion w Contrast - For Tier 2 Stroke   Final Result   IMPRESSION:    HEAD CT:   1.  No CT finding of a mass, hemorrhage or focal area suggestive of acute infarct.      HEAD CTA:    1.  No discrete vessel occlusion, significant stenosis, aneurysm or high flow vascular malformation involving the arteries of the [place].      NECK CTA:   1.  Normal configuration of the great vessels off the aortic arch with no significant stenosis of their origins.   2.  No significant stenosis or irregularity involving the arteries of the neck by NASCET criteria.   3.  No radiographic evidence of dissection.      CT PERFUSION:   1.  Symmetric cerebral blood flow and volume to the cerebral hemispheres bilaterally but there is a mild delay in mean transit time to the posterior left frontal lobe.   2.  Recommend MRI the brain without and with contrast for further evaluation.      Head CT findings were communicated by phone to Dr. Alston at 7:51 PM. CTA findings were communicated to Dr. Alston at 8:07 PM on 07/22/2023.      CTA Head Neck with Contrast   Final Result   IMPRESSION:    HEAD CT:   1.  No CT finding of a mass, hemorrhage or focal area suggestive of acute infarct.      HEAD CTA:    1.  No discrete vessel occlusion, significant stenosis, aneurysm or high flow vascular malformation involving the arteries of the [place].      NECK CTA:   1.  Normal configuration of the great vessels off the aortic arch with no significant stenosis of their origins.   2.  No significant stenosis or irregularity involving the arteries of the neck by NASCET criteria.   3.  No  radiographic evidence of dissection.      CT PERFUSION:   1.  Symmetric cerebral blood flow and volume to the cerebral hemispheres bilaterally but there is a mild delay in mean transit time to the posterior left frontal lobe.   2.  Recommend MRI the brain without and with contrast for further evaluation.      Head CT findings were communicated by phone to Dr. Alston at 7:51 PM. CTA findings were communicated to Dr. Alston at 8:07 PM on 07/22/2023.      CT Head w/o Contrast   Final Result   IMPRESSION:    HEAD CT:   1.  No CT finding of a mass, hemorrhage or focal area suggestive of acute infarct.      HEAD CTA:    1.  No discrete vessel occlusion, significant stenosis, aneurysm or high flow vascular malformation involving the arteries of the [place].      NECK CTA:   1.  Normal configuration of the great vessels off the aortic arch with no significant stenosis of their origins.   2.  No significant stenosis or irregularity involving the arteries of the neck by NASCET criteria.   3.  No radiographic evidence of dissection.      CT PERFUSION:   1.  Symmetric cerebral blood flow and volume to the cerebral hemispheres bilaterally but there is a mild delay in mean transit time to the posterior left frontal lobe.   2.  Recommend MRI the brain without and with contrast for further evaluation.      Head CT findings were communicated by phone to Dr. Alston at 7:51 PM. CTA findings were communicated to Dr. Alston at 8:07 PM on 07/22/2023.      MR Brain w/o & w Contrast    (Results Pending)      Report per radiology    Laboratory:  Labs Ordered and Resulted from Time of ED Arrival to Time of ED Departure   COMPREHENSIVE METABOLIC PANEL - Normal       Result Value    Sodium 143      Potassium 4.0      Chloride 105      Carbon Dioxide (CO2) 25      Anion Gap 13      Urea Nitrogen 8.1      Creatinine 1.14      Calcium 9.8      Glucose 80      Alkaline Phosphatase 94      AST 23      ALT 33      Protein Total 6.9      Albumin 4.5       Bilirubin Total 0.4      GFR Estimate 80     INR - Normal    INR 0.96     PARTIAL THROMBOPLASTIN TIME - Normal    aPTT 26     TROPONIN T, HIGH SENSITIVITY - Normal    Troponin T, High Sensitivity <6     ETHYL ALCOHOL LEVEL - Normal    Alcohol ethyl <0.01     CBC WITH PLATELETS AND DIFFERENTIAL    WBC Count 9.3      RBC Count 4.99      Hemoglobin 15.9      Hematocrit 45.6      MCV 91      MCH 31.9      MCHC 34.9      RDW 13.2      Platelet Count 281      % Neutrophils 56      % Lymphocytes 32      % Monocytes 9      % Eosinophils 2      % Basophils 1      % Immature Granulocytes 0      NRBCs per 100 WBC 0      Absolute Neutrophils 5.2      Absolute Lymphocytes 3.0      Absolute Monocytes 0.8      Absolute Eosinophils 0.2      Absolute Basophils 0.1      Absolute Immature Granulocytes 0.0      Absolute NRBCs 0.0     GLUCOSE MONITOR NURSING POCT        Procedures   None    Emergency Department Course & Assessments:           Interventions:  Medications   iopamidol (ISOVUE-370) solution 100 mL (100 mLs Intravenous $Given 7/22/23 1927)   Saline (100 mLs Intravenous $Given 7/22/23 1928)   gadobutrol (GADAVIST) injection 8 mL (8 mLs Intravenous $Given 7/22/23 2146)   sodium chloride (PF) 0.9% PF flush 10 mL (10 mLs Intravenous $Given 7/22/23 2146)          Independent Interpretation (X-rays, CTs, rhythm strip):  None    Assessments/Consultations/Discussion of Management or Tests:  ED Course as of 07/22/23 2155   Sat Jul 22, 2023 1913 I obtained history and examined the patient as noted above.    1951 I spoke with Dr. Flood, of the [facility], regarding the patient.    2006 I spoke with Dr. Flood, of the [facility], regarding the patient.    2010 I rechecked the patient and spoke with neurology who was in with the patient electronically.    2046 I rechecked the patient and explained findings.        Social Determinants of Health affecting care:   None    Disposition:  The patient  was admitted to the hospital under the care of Dr. Fish.     Impression & Plan    CMS Diagnoses: The patient has stroke symptoms:         ED Stroke specific documentation           NIHSS PDF     Patient last known well time: 1400  ED Provider first to bedside at: Upon ED room arrival  CT Results received at: 7:51 PM and 8:07 PM    Thrombolytics:   Not given due to:   - minor/isolated/quickly resolving symptoms    If treating with thrombolytics: Ensure SBP<180 and DBP<105 prior to treatment with thrombolytics.  Administering thrombolytics after treatment with IV labetalol, hydralazine, or nicardipine is reasonable once BP control is established.    Endovascular Retrieval:  Not initiated due to absence of proximal vessel occlusion    National Institutes of Health Stroke Scale (Baseline)  Time Performed: ED room arrival     Score    Level of consciousness: (0)   Alert, keenly responsive    LOC questions: (0)   Answers both questions correctly    LOC commands: (0)   Performs both tasks correctly    Best gaze: (0)   Normal    Visual: (0)   No visual loss    Facial palsy: (0)   Normal symmetrical movements    Motor arm (left): (0)   No drift    Motor arm (right): (0)   No drift    Motor leg (left): (0)   No drift    Motor leg (right): (0)   No drift    Limb ataxia: (0)   Absent    Sensory: (0)   Normal- no sensory loss    Best language: (0)   Normal- no aphasia    Dysarthria: (0)   Normal    Extinction and inattention: (0)   No abnormality        Total Score:  0        Stroke Mimics were considered (including migraine headache, seizure disorder, hypoglycemia (or hyperglycemia), head or spinal trauma, CNS infection, Toxin ingestion and shock state (e.g. sepsis)    Medical Decision Making:  This patient presents with concern of episodic stroke symptoms.  Experienced intermittent numbness/sensory change of the distal right upper extremity in addition to clumsiness of the right hand.  Also difficulty communicating and  spelling words.  Underwent a tier 2 stroke evaluation given the time course of his symptoms/onset.  CT scans were negative although there was some concern for abnormality on the perfusion component.  Consulted neurologist recommended MRI brain.  This result is yet pending.  He also recommended admission to the hospital and to initiate Plavix and aspirin and overnight neurochecks and further testing with respect to the patient's episode.  Glad that the patient's symptoms seem to resolve even by the time he arrived to the emergency department.    Diagnosis:    ICD-10-CM    1. TIA (transient ischemic attack)  G45.9            Discharge Medications:  New Prescriptions    No medications on file          Scribe Disclosure:  I, Britney Headjustin, am serving as a scribe at 8:45 PM on 7/22/2023 to document services personally performed by Kevon Alston MD based on my observations and the provider's statements to me.      7/22/2023   Kevon Alston MD Moe, James Thomas, MD  07/22/23 5320

## 2023-07-23 NOTE — PHARMACY-ADMISSION MEDICATION HISTORY
Pharmacist Admission Medication History    Admission medication history is complete. The information provided in this note is only as accurate as the sources available at the time of the update.    Medication reconciliation/reorder completed by provider prior to medication history? No    Information Source(s): Patient via in-person   -no fill history available via [employer]  -no medication list available in Care Everywhere at time of writing (patient awaiting authorization for record access)      Pertinent Information: patient states not taking any Rx, OTC, herbals/supplements     Changes made to PTA medication list:    Added: None    Deleted: None    Changed: None    Medication Affordability:  Not including over the counter (OTC) medications, was there a time in the past 3 months when you did not take your medications as prescribed because of cost?:  (n/a)    Allergies reviewed with patient and updates made in EHR: no - already assessed this encounter     Medication History Completed By: Bonnie Stover RPH 7/22/2023 8:52 PM    Prior to Admission medications    Not on File

## 2023-07-23 NOTE — PROGRESS NOTES
RECEIVING UNIT ED HANDOFF REVIEW    ED Nurse Handoff Report was reviewed by: Drew Hook RN on July 23, 2023 at 12:03 AM

## 2023-07-23 NOTE — PHARMACY-CONSULT NOTE
Pharmacy Consult to evaluate for medication related stroke core measures    Mundo Kern, 48 year old male admitted for Acute ischemic stroke of left MCA territory on 7/22/2023.    Thrombolytic was not given because of Clinical contraindications, minor/resolving sx     VTE Prophylaxis SCDs /PCDs ordered on 7/23, as appropriate prior to end of hospital day 2.    Antithrombotic: aspirin and clopidogrel started on 7/23, as appropriate by end of hospital day 2. Continue antithrombotic therapy on discharge to meet quality measures, unless contraindicated.    Anticoagulation if history of A-fib/flutter: Patient does not have history of A-fib/flutter - anticoagulation not required for medication related stroke core measures.     LDL Cholesterol Calculated   Date Value Ref Range Status   07/22/2023 103 (H) <=100 mg/dL Final       Patient currently receiving Lipitor (atorvastatin) continue statin on discharge to meet quality measures, unless contraindicated.    Recommendations: PCDs have been ordered but should be placed by the end ot today to meet stroke core measures.    Thank you for the consult.    Kathryn Asher [facility] 7/23/2023 10:06 AM

## 2023-07-23 NOTE — ED NOTES
[facility]  ED Nurse Handoff Report    ED Chief complaint: Stroke Symptoms      ED Diagnosis:   Final diagnoses:   TIA (transient ischemic attack)       Code Status: to be addressed    Allergies: No Known Allergies    Patient Story: Pt presented to ED with intermittent right hand numbness and word finding difficulty that started around 1500. Pt reports that sx have gone away at this time.     Focused Assessment:    Respiratory: On RA, stating in the 90s  Neuro: A+ox4, NIHSS: 0, earlier had right hand numbness but has since resolved. Pt also had word finding difficulty that has now resolved.     Treatments and/or interventions provided:   Labs Ordered and Resulted from Time of ED Arrival to Time of ED Departure   COMPREHENSIVE METABOLIC PANEL - Normal       Result Value    Sodium 143      Potassium 4.0      Chloride 105      Carbon Dioxide (CO2) 25      Anion Gap 13      Urea Nitrogen 8.1      Creatinine 1.14      Calcium 9.8      Glucose 80      Alkaline Phosphatase 94      AST 23      ALT 33      Protein Total 6.9      Albumin 4.5      Bilirubin Total 0.4      GFR Estimate 80     INR - Normal    INR 0.96     PARTIAL THROMBOPLASTIN TIME - Normal    aPTT 26     TROPONIN T, HIGH SENSITIVITY - Normal    Troponin T, High Sensitivity <6     ETHYL ALCOHOL LEVEL - Normal    Alcohol ethyl <0.01     CBC WITH PLATELETS AND DIFFERENTIAL    WBC Count 9.3      RBC Count 4.99      Hemoglobin 15.9      Hematocrit 45.6      MCV 91      MCH 31.9      MCHC 34.9      RDW 13.2      Platelet Count 281      % Neutrophils 56      % Lymphocytes 32      % Monocytes 9      % Eosinophils 2      % Basophils 1      % Immature Granulocytes 0      NRBCs per 100 WBC 0      Absolute Neutrophils 5.2      Absolute Lymphocytes 3.0      Absolute Monocytes 0.8      Absolute Eosinophils 0.2      Absolute Basophils 0.1      Absolute Immature Granulocytes 0.0      Absolute NRBCs 0.0     GLUCOSE MONITOR NURSING POCT       CT Head Perfusion  w Contrast - For Tier 2 Stroke   Final Result   IMPRESSION:    HEAD CT:   1.  No CT finding of a mass, hemorrhage or focal area suggestive of acute infarct.      HEAD CTA:    1.  No discrete vessel occlusion, significant stenosis, aneurysm or high flow vascular malformation involving the arteries of the [place].      NECK CTA:   1.  Normal configuration of the great vessels off the aortic arch with no significant stenosis of their origins.   2.  No significant stenosis or irregularity involving the arteries of the neck by NASCET criteria.   3.  No radiographic evidence of dissection.      CT PERFUSION:   1.  Symmetric cerebral blood flow and volume to the cerebral hemispheres bilaterally but there is a mild delay in mean transit time to the posterior left frontal lobe.   2.  Recommend MRI the brain without and with contrast for further evaluation.      Head CT findings were communicated by phone to Dr. Alston at 7:51 PM. CTA findings were communicated to Dr. Alston at 8:07 PM on 07/22/2023.      CTA Head Neck with Contrast   Final Result   IMPRESSION:    HEAD CT:   1.  No CT finding of a mass, hemorrhage or focal area suggestive of acute infarct.      HEAD CTA:    1.  No discrete vessel occlusion, significant stenosis, aneurysm or high flow vascular malformation involving the arteries of the [place].      NECK CTA:   1.  Normal configuration of the great vessels off the aortic arch with no significant stenosis of their origins.   2.  No significant stenosis or irregularity involving the arteries of the neck by NASCET criteria.   3.  No radiographic evidence of dissection.      CT PERFUSION:   1.  Symmetric cerebral blood flow and volume to the cerebral hemispheres bilaterally but there is a mild delay in mean transit time to the posterior left frontal lobe.   2.  Recommend MRI the brain without and with contrast for further evaluation.      Head CT findings were communicated by phone to Dr. Alston at 7:51  "PM. CTA findings were communicated to Dr. Alston at 8:07 PM on 07/22/2023.      CT Head w/o Contrast   Final Result   IMPRESSION:    HEAD CT:   1.  No CT finding of a mass, hemorrhage or focal area suggestive of acute infarct.      HEAD CTA:    1.  No discrete vessel occlusion, significant stenosis, aneurysm or high flow vascular malformation involving the arteries of the [place].      NECK CTA:   1.  Normal configuration of the great vessels off the aortic arch with no significant stenosis of their origins.   2.  No significant stenosis or irregularity involving the arteries of the neck by NASCET criteria.   3.  No radiographic evidence of dissection.      CT PERFUSION:   1.  Symmetric cerebral blood flow and volume to the cerebral hemispheres bilaterally but there is a mild delay in mean transit time to the posterior left frontal lobe.   2.  Recommend MRI the brain without and with contrast for further evaluation.      Head CT findings were communicated by phone to Dr. Alston at 7:51 PM. CTA findings were communicated to Dr. Alston at 8:07 PM on 07/22/2023.      MR Brain w/o & w Contrast    (Results Pending)     Patient's response to treatments and/or interventions: tolerated appropriately     To be done/followed up on inpatient unit:  frequent vitals, repeat labs and imaging, frequent neuros      Does this patient have any cognitive concerns?: none    Activity level - Baseline/Home:  Independent  Activity Level - Current:   Independent    Patient's Preferred language: English   Needed?: No    Isolation: None  Infection: Not Applicable  Patient tested for COVID 19 prior to admission: NO  Bariatric?: No    Vital Signs:   Vitals:    07/22/23 1903   BP: (!) 142/99   Pulse: 95   Resp: 18   Temp: 98.1  F (36.7  C)   TempSrc: Temporal   SpO2: 97%   Weight: 81.6 kg (180 lb)   Height: 1.803 m (5' 11\")       Cardiac Rhythm:     Was the PSS-3 completed:   Yes  What interventions are required if any?             "   Family Comments:   OBS brochure/video discussed/provided to patient/family: N/A              Name of person given brochure if not patient:              Relationship to patient:     For the majority of the shift this patient's behavior was Green.   Behavioral interventions performed were .    ED NURSE PHONE NUMBER:

## 2023-07-23 NOTE — PROGRESS NOTES
Physical Therapy: Orders received. Chart reviewed and discussed with care team.? Physical Therapy not indicated due to pt independent w/ functional mobility, at baseline.? Defer discharge recommendations to medical team.? Will complete orders.

## 2023-07-23 NOTE — PLAN OF CARE
Occupational Therapy: Orders received. Chart reviewed and discussed with care team.? Occupational Therapy not indicated due to patient being independent in room with mobility and ADLs.?Patient expressing no concerns with I/ADLs at discharge and feels to be at baseline for mobility. Defer discharge recommendations to current care team.? Will complete orders.

## 2023-07-23 NOTE — PLAN OF CARE
Goal Outcome Evaluation:  Chief of complained   Acute ischemic stroke.     Orientation: A/O x4  Neuro Intact    Vitals: VSs on Room Air     IV Access/drains: R PIV SL    Diet : Regular    Mobility: Independent     GI/: Independent    Wound/Skin Intact     Consults :Neuro    Discharge Plan: ECHO with bubble study. discharge TBD      See Flow sheets for assessment

## 2023-07-24 ENCOUNTER — APPOINTMENT (OUTPATIENT)
Dept: CARDIOLOGY | Facility: CLINIC | Age: 48
DRG: 066 | End: 2023-07-24
Attending: HOSPITALIST
Payer: COMMERCIAL

## 2023-07-24 PROBLEM — I63.9 CEREBROVASCULAR ACCIDENT (CVA), UNSPECIFIED MECHANISM (H): Status: ACTIVE | Noted: 2023-07-24

## 2023-07-24 LAB
AT III ACT/NOR PPP CHRO: 112 % (ref 85–135)
B2 GLYCOPROT1 IGG SERPL IA-ACNC: <0.8 U/ML
B2 GLYCOPROT1 IGM SERPL IA-ACNC: <2.4 U/ML
CARDIOLIPIN IGG SER IA-ACNC: 2.4 GPL-U/ML
CARDIOLIPIN IGG SER IA-ACNC: NEGATIVE
CARDIOLIPIN IGM SER IA-ACNC: <2 MPL-U/ML
CARDIOLIPIN IGM SER IA-ACNC: NEGATIVE
DRVVT SCREEN RATIO: <0.65
GLUCOSE BLDC GLUCOMTR-MCNC: 99 MG/DL (ref 70–99)
INR PPP: 1.05 (ref 0.85–1.15)
LA PPP-IMP: NEGATIVE
LUPUS INTERPRETATION: NORMAL
LVEF ECHO: NORMAL
PROT C ACT/NOR PPP CHRO: 100 % (ref 70–170)
PROT S FREE AG ACT/NOR PPP IA: 150 % (ref 70–148)
PTT RATIO: 0.95
THROMBIN TIME: 17.2 SECONDS (ref 13–19)

## 2023-07-24 PROCEDURE — 120N000001 HC R&B MED SURG/OB

## 2023-07-24 PROCEDURE — G0378 HOSPITAL OBSERVATION PER HR: HCPCS

## 2023-07-24 PROCEDURE — 250N000013 HC RX MED GY IP 250 OP 250 PS 637: Performed by: HOSPITALIST

## 2023-07-24 PROCEDURE — 999N000208 ECHOCARDIOGRAM COMPLETE

## 2023-07-24 PROCEDURE — 93306 TTE W/DOPPLER COMPLETE: CPT

## 2023-07-24 PROCEDURE — 250N000013 HC RX MED GY IP 250 OP 250 PS 637: Performed by: EMERGENCY MEDICINE

## 2023-07-24 PROCEDURE — 93306 TTE W/DOPPLER COMPLETE: CPT | Mod: 26 | Performed by: INTERNAL MEDICINE

## 2023-07-24 PROCEDURE — 99232 SBSQ HOSP IP/OBS MODERATE 35: CPT | Performed by: INTERNAL MEDICINE

## 2023-07-24 PROCEDURE — 99207 PR NO BILLABLE SERVICE THIS VISIT: CPT | Performed by: INTERNAL MEDICINE

## 2023-07-24 PROCEDURE — 99232 SBSQ HOSP IP/OBS MODERATE 35: CPT | Performed by: PHYSICIAN ASSISTANT

## 2023-07-24 RX ORDER — ATORVASTATIN CALCIUM 40 MG/1
40 TABLET, FILM COATED ORAL EVERY EVENING
Qty: 30 TABLET | Refills: 0 | Status: SHIPPED | OUTPATIENT
Start: 2023-07-24

## 2023-07-24 RX ORDER — ASPIRIN 81 MG/1
81 TABLET, CHEWABLE ORAL DAILY
Qty: 19 TABLET | Refills: 0 | Status: SHIPPED | OUTPATIENT
Start: 2023-07-25 | End: 2023-08-13

## 2023-07-24 RX ORDER — CLOPIDOGREL BISULFATE 75 MG/1
75 TABLET ORAL DAILY
Qty: 19 TABLET | Refills: 0 | Status: SHIPPED | OUTPATIENT
Start: 2023-07-25 | End: 2023-08-13

## 2023-07-24 RX ORDER — ASPIRIN 325 MG
325 TABLET, DELAYED RELEASE (ENTERIC COATED) ORAL DAILY
Qty: 90 TABLET | Refills: 0 | Status: SHIPPED | OUTPATIENT
Start: 2023-08-13

## 2023-07-24 RX ADMIN — ASPIRIN 81 MG 81 MG: 81 TABLET ORAL at 09:06

## 2023-07-24 RX ADMIN — ATORVASTATIN CALCIUM 40 MG: 40 TABLET, FILM COATED ORAL at 20:01

## 2023-07-24 RX ADMIN — CLOPIDOGREL BISULFATE 75 MG: 75 TABLET ORAL at 09:06

## 2023-07-24 ASSESSMENT — ACTIVITIES OF DAILY LIVING (ADL)
FALL_HISTORY_WITHIN_LAST_SIX_MONTHS: NO
TOILETING_ISSUES: NO
ADLS_ACUITY_SCORE: 31
DEPENDENT_IADLS:: INDEPENDENT
DIFFICULTY_EATING/SWALLOWING: NO
ADLS_ACUITY_SCORE: 31
CHANGE_IN_FUNCTIONAL_STATUS_SINCE_ONSET_OF_CURRENT_ILLNESS/INJURY: NO
DOING_ERRANDS_INDEPENDENTLY_DIFFICULTY: NO
WEAR_GLASSES_OR_BLIND: NO
ADLS_ACUITY_SCORE: 31
ADLS_ACUITY_SCORE: 31
ADLS_ACUITY_SCORE: 18
WALKING_OR_CLIMBING_STAIRS_DIFFICULTY: NO
ADLS_ACUITY_SCORE: 31
DRESSING/BATHING_DIFFICULTY: NO
ADLS_ACUITY_SCORE: 31
CONCENTRATING,_REMEMBERING_OR_MAKING_DECISIONS_DIFFICULTY: NO
ADLS_ACUITY_SCORE: 18

## 2023-07-24 NOTE — PROGRESS NOTES
[facility]    Stroke Progress Note    Interval Events  TTE with no clear embolic source for stroke. No acute events since yesterday. Discussed recommendation for MARIA TERESA to complete work up for stroke in the young. He does not want to stay another night and is leaning toward going home. If so recommend MARIA TERESA be done as an outpatient.    HPI Summary  Mundo Kern is a 48 year old male with PMH tobacco use, has not seen a doctor in >10 years, presented with R hand numbness and clumsiness onset 1 7/22 afternoon. Also noted a brief episode of loss of balance and dizziness. Symtpoms had resolved by the time of evaluation in the ED.     Stroke Evaluation Summarized     MRI/Head CT MRI: acute/early subacute small L MCA territory infarcts   Intracranial Vasculature CTA head: no LVO, no significant stenosis   Cervical Vasculature CTA neck: no significant stenosis      Echocardiogram EF 55-60%, grossly normal wall motion, cannot rule out small area of basal to mid inferior wall hypokinesis, normal LA size, negative bubble study   EKG/Telemetry SR   Other Testing CTP: Symmetric cerebral blood flow and volume to the cerebral hemispheres bilaterally but there is a mild delay in mean transit time to the posterior left frontal lobe.    Hypercoagulable labs: pending      LDL  7/22/2023: 103 mg/dL   A1C  7/22/2023: 5.1 %   Troponin 7/23/2023: <6 ng/L         Impression  Scattered acute ischemic strokes of L MCA territory due to embolic stroke of undetermined source (ESUS)        Recommendations   - Neurochecks and Vital Signs every 4 hours   - SBP <180  - Long term outpatient BP Goal <130/80  - DAPT x21 days with daily aspirin 81 mg + Plavix 75 mg followed by aspirin 325 mg daily monotherapy for secondary stroke prevention  - Statin: atorvastatin 40 mg, titrate to LDL goal 40-70  - MARIA TERESA recommended - ideally should be done this admission in case there are findings that . Discussed risks  "and benefits at length with the patient and his mother. He is unsure if he will stay. I have ordered the MARIA TERESA for tomorrow. If he opts to leave then MARIA TERESA should be scheduled as an outpatient.  - Telemetry, EKG  - discharge with 30 day cardiac monitor to eval for atrial fibrillation (ordered)  - Hypercoagulable labs ordered given young age (ordered and pending)  - Bedside Glucose Monitoring  - Nutrition: per nursing  - PT/OT/SLP have evaluated  - Stroke Education  - Smoking Cessation discussed - he states he is determined to quit  - Depression Screen  - Euthermia, Euglycemia     Patient Follow-up    - in the next 1-2 week(s) with PCP - needs to establish care  - in 6-8 weeks with general neurology ([phone]) (ordered)    We will continue to follow.     Shayy David PA-C  Vascular Neurology    To page me or covering stroke neurology team member, click here: [facility]  Choose \"On Call\" tab at top, then select \"NEUROLOGY/ALL SITES\" from middle drop-down box, press Enter, then look for \"stroke\" or \"telestroke\" for your site.    ______________________________________________________    Clinically Significant Risk Factors Present on Admission                       # Overweight: Estimated body mass index is 25.1 kg/m  as calculated from the following:    Height as of this encounter: 1.803 m (5' 11\").    Weight as of this encounter: 81.6 kg (180 lb).          Medications   Scheduled Meds   aspirin  81 mg Oral Daily    atorvastatin  40 mg Oral QPM    clopidogrel  75 mg Oral Daily    sodium chloride (PF)  3 mL Intracatheter Q8H       Infusion Meds   - MEDICATION INSTRUCTIONS -      - MEDICATION INSTRUCTIONS -         PRN Meds  lidocaine 4%, lidocaine (buffered or not buffered), - MEDICATION INSTRUCTIONS -, - MEDICATION INSTRUCTIONS -, sodium chloride (PF)       PHYSICAL EXAMINATION  Temp:  [97.9  F (36.6  C)-98.4  F (36.9  C)] 98.4  F (36.9  C)  Pulse:  [67-76] 71  Resp:  [16-18] 18  BP: (122-141)/() 135/89  SpO2:  [92 %-98 " %] 92 %      General Exam  General:  Patient sitting in the chair without any acute distress    HEENT:  normocephalic/atraumatic  Pulmonary:  no respiratory distress     Neuro Exam  Mental Status:  alert, oriented x 3, follows commands, speech clear and fluent  Cranial Nerves:  visual fields intact, EOMI, facial movement symmetric   Motor:  moves all extremities without focal weakness, strength not formally assess today      Imaging  I personally reviewed all imaging; relevant findings per HPI.     Lab Results Data   CBC  Recent Labs   Lab 07/22/23 1914   WBC 9.3   RBC 4.99   HGB 15.9   HCT 45.6        Basic Metabolic Panel    Recent Labs   Lab 07/23/23  2359 07/23/23  1715 07/23/23  1158 07/23/23  0718 07/22/23 1914   NA  --   --   --   --  143   POTASSIUM  --   --   --   --  4.0   CHLORIDE  --   --   --   --  105   CO2  --   --   --   --  25   BUN  --   --   --   --  8.1   CR  --   --   --   --  1.14   GLC 99 92 96   < > 80   SOFI  --   --   --   --  9.8    < > = values in this interval not displayed.     Liver Panel  Recent Labs   Lab 07/22/23 1914   PROTTOTAL 6.9   ALBUMIN 4.5   BILITOTAL 0.4   ALKPHOS 94   AST 23   ALT 33     INR    Recent Labs   Lab Test 07/22/23 1914   INR 0.96      Lipid Profile    Recent Labs   Lab Test 07/22/23 1914   CHOL 195   HDL 41   *   TRIG 256*     A1C    Recent Labs   Lab Test 07/22/23 1914   A1C 5.1     Troponin    Recent Labs   Lab 07/23/23  0124 07/22/23 1914   CTROPT <6 <6          Data   I have personally spent a total of 35 minutes providing care today, time spent in reviewing medical records and reviewing tests, examining the patient and obtaining history, coordination of care, and discussion with the patient and/or family regarding diagnostic results, prognosis, symptom management, risks and benefits of management options, and development of plan of care. Greater than 50% was spent in counseling and coordination of care.

## 2023-07-24 NOTE — UTILIZATION REVIEW
[facility] Utilization Review  Admission Status; Secondary Review Determination     Admission Date: 7/22/2023  7:04 PM      Under the authority of the Utilization Management Committee, the utilization review process indicated a secondary review on the above patient.  The review outcome is based on review of the medical records, discussions with staff, and applying clinical experience noted on the date of the review.        (X)      Inpatient Status Appropriate - This patient's medical care is consistent with medical management for inpatient care and reasonable inpatient medical practice.          RATIONALE FOR DETERMINATION   48-year-old male with no past medical history, admitted with right hand numbness, decreased high-sensitivity, clumsiness with brief episode of losing balance, dizziness that resolved.  Blood pressure and heart rate have been stable, no focal neurologic deficits, no dysarthria.  CT head CTA head and neck unremarkable, CT perfusion shows mild delay in transit time to posterior left frontal lobe, EKG normal sinus rhythm.  Patient started on aspirin and Plavix load, MRI brain shows small acute left MCA infarct with involvement of postcentral gyrus, late acute infarcts in the left MCA, no mass or hemorrhage.  With neurochecks, echocardiogram pending.  Hypercoagulable work-up complete.  Patient with no past medical history, now found to have left MCA infarcts, started on aspirin and Plavix after load, ongoing neurochecks, echocardiogram pending, patient is at risk of acute decompensation, with anticipated length of stay more than 2 midnight, recommend change to inpatient status, communicated to Dr. Sinha      The severity of illness, intensity of service provided, expected LOS and risk for adverse outcome make the care complex, high risk and appropriate for hospital admission.The patient requires hospital based medical care which is anticipated to require a stay of 2 or more midnights.         The information on this document is developed by the utilization review team in order for the business office to ensure compliance.  This only denotes the appropriateness of proper admission status and does not reflect the quality of care rendered.              Sincerely,       Jessica Quintanilla MD  Physician Advisor  Utilization Review-[place]    Phone: [phone]

## 2023-07-24 NOTE — PLAN OF CARE
Goal Outcome Evaluation:       A&Ox4 VSS on RA. Lungs sounds clear bilaterally. Bowel Sounds - normoactive. Urine Output adequate.  Ambulation- Independent. Diet Regular, NPO at midnight for MARIA TERESA in the morning. Pain controlled by denied pain.

## 2023-07-24 NOTE — DISCHARGE SUMMARY
[facility]    Discharge Summary  Hospitalist    Date of Admission:  7/22/2023  Date of Discharge:  7/24/2023  Discharging Provider: Nghia Sinha MD, MD  Date of Service (when I saw the patient): 07/24/23    Discharge Diagnoses   Acute ischemic stroke left MCA territory due to embolic stroke of undetermined source  Hyperlipidemia  Tobacco abuse    History of Present Illness   Mundo Kern is an 48 year old male who presented with right hand weakness and word finding difficulty    Hospital Course   Mundo Kern  Is a 48 year old male without any history of prior medical problem presented to the emergency department with right hand weakness, lightheadedness and difficulty finding words.  Reportedly started on July 22, 2023 around 2 PM, while patient reportedly was playing a video game.  After he presented to the emergency department, the symptoms reportedly had resolved.    Final discharge diagnoses and hospital course     Acute ischemic stroke of left MCA territory due to embolic stroke of undetermined source  Hypertension  -  Patient presented with right hand weakness, lightheadedness and word finding difficulty.  She was evaluated in the ED ED, stroke code was called, CT head CT of the head and neck and CT perfusion was done.  CT head no finding or mass hemorrhage or focal area suggestive of acute infarct, CT of the head no discrete vessel occlusion, aneurysm-vascular malformation, CT of the neck showed no significant stenosis.  CT perfusion showed mild delay in the mean transit time to the posterior left frontal lobe.    MRI of the brain was done, shows Small acute left MCA territory infarct with involvement of the postcentral gyrus. There late acute infarcts in the left MCA distribution involving the left middle frontal gyrus and posterior insula. No mass, acute hemorrhage, or   extra-axial fluid collections.    She was loaded with aspirin, and Plavix 3 mg times once.  And then  started on aspirin 81 mg daily and Plavix 75 mg daily for 21 days, then switch to aspirin 325 mg daily.  Is started on Lipitor 40 mg daily as well.  Today hypertensive, now the blood pressure is stable, and normal range.  --Long-term goal for blood pressure is SBP< 130 and DBBP<  80  -DAPT x21 days with daily aspirin 81 mg plus Plavix 75 mg followed by aspirin 325 mg daily for secondary prevention  -EKG and telemetry while in the hospital remained normal sinus rhythm.  -Echocardiogram was done, shows normal ejection fraction 55 to 60%, significant valvular abnormality, bubble study was negative  -Reviewed laboratory data including TSH, A1c, troponin, alcohol ethyl, aPTT, INR, comprehensive metabolic profile and CBC all negative.    Patient symptoms completely resolved, PT has discharged him from inpatient physical therapy at this time.  Stroke Neuro recommend MAIRA TERESA which was done and negative for intracardiac mass or thrombus, Normal EF of 60-65%, normal left and right ventricle in size and systolic dysfunction.   He will be discharged home in stable improved condition.  He needs to follow-up with the primary care physician in 1 week and stroke neurology in 6-8 weeks.     Alcohol use  Nicotine dependency    -History of a pack a day cigarette smoking  -Drinks 3-4 times a week in the amount of 4-6 beer each time  -Patient is advised to make lifestyle modification    Smoking cessation counseling given of nicotine patches, but patient declined.  He is motivated to quit smoking.  Counseling given regarding alcohol abuse as well.  He will cut down his alcohol intake.  -He will establish care with primary care doctor.    -Education provided regarding high cholesterol and risk for stroke and heart attack.  -Encouraged him to take medication as prescribed.  -Patient understood and agreed with the plan.        Patient discharged home in stable improved condition.     Nghia Sinha MD, MD    Significant Results and Procedures      Echocardiogram  Interpretation Summary     The visual ejection fraction is 55-60%.  Grossly normal wall motion. A small area of basal to mid inferior wall  hypokinesis cannot be ruled out. Contrast would be useful for better  endocardial border definition.  The right ventricle is normal in size and function.  The inferior vena cava was normal in size with preserved respiratory  variability.  A contrast injection (Bubble Study) was performed that was negative for flow  across the interatrial septum.    MARIA TERESA  Interpretation Summary     Challenging study due to patient requiring very high doses of sedation and  attempting to pull probe out.     1. No intracardiac mass or thrombus.  2. Normal left ventricular size and systolic function. LVEF 60-65%.  3. Normal right ventricular size and systolic function.  4. No significant valve disease.  5. Bubble study was not performed today due to sedation challenges. Had a  negative bubble study on transthoracic study yesterday.       Pending Results   These results will be followed up by PCP  Unresulted Labs Ordered in the Past 30 Days of this Admission       No orders found from 6/22/2023 to 7/23/2023.            Code Status   Full Code       Primary Care Physician   Physician No Ref-Primary    Physical Exam   Temp: 98  F (36.7  C) Temp src: Oral BP: 129/86 Pulse: 83   Resp: 18 SpO2: 95 % O2 Device: None (Room air)    Vitals:    07/22/23 1903   Weight: 81.6 kg (180 lb)     Vital Signs with Ranges  Temp:  [97.9  F (36.6  C)-98.4  F (36.9  C)] 98  F (36.7  C)  Pulse:  [68-83] 83  Resp:  [16-18] 18  BP: (122-141)/() 129/86  SpO2:  [92 %-97 %] 95 %  I/O last 3 completed shifts:  In: 200 [P.O.:200]  Out: -     Constitutional: awake, alert, cooperative, no apparent distress, and appears stated age  Eyes: Lids and lashes normal, pupils equal, round and reactive to light, extra ocular muscles intact, sclera clear, conjunctiva normal  Respiratory: No increased work of breathing,  good air exchange, clear to auscultation bilaterally, no crackles or wheezing  Cardiovascular: Normal apical impulse, regular rate and rhythm, normal S1 and S2, no S3 or S4, and no murmur noted  GI: No scars, normal bowel sounds, soft, non-distended, non-tender, no masses palpated, no hepatosplenomegally  Musculoskeletal: no lower extremity pitting edema present  Neurologic: No focal deficit    Discharge Disposition   Discharged to home  Condition at discharge: Stable    Consultations This Hospital Stay   PATIENT [facility] IP CONSULT  NEUROLOGY IP STROKE CONSULT  SPEECH LANGUAGE PATH ADULT IP CONSULT  PHARMACY IP CONSULT  PHARMACY IP CONSULT  PHARMACY IP CONSULT  PHYSICAL THERAPY ADULT IP CONSULT  OCCUPATIONAL THERAPY ADULT IP CONSULT  REHAB ADMISSIONS LIAISON IP CONSULT  CARE MANAGEMENT / SOCIAL WORK IP CONSULT  SMOKING CESSATION PROGRAM IP CONSULT    Time Spent on this Encounter   INghia MD, personally saw the patient today and spent greater than 30 minutes discharging this patient.    Discharge Orders      Speech Therapy Referral      Reason for your hospital stay    Acute ischemic stroke     Follow-up and recommended labs and tests     Follow up with primary care provider, Physician No Ref-Primary, within 7 days for hospital follow- up.  No follow up labs or test are needed.     Activity    Your activity upon discharge: activity as tolerated     Diet    Follow this diet upon discharge: Orders Placed This Encounter      Combination Diet Regular Diet     Stroke Hospital Follow Up (for neurologist use only)    [facility] will call you to coordinate care as prescribed by your provider. If you don t hear from a representative within 2 business days, please call [phone].       Discharge Medications   Current Discharge Medication List        START taking these medications    Details   aspirin (ASA) 325 MG EC tablet Take 1 tablet (325 mg) by mouth daily  Qty: 90 tablet, Refills: 0     Associated Diagnoses: Cerebrovascular accident (CVA), unspecified mechanism (H)      aspirin (ASA) 81 MG chewable tablet Take 1 tablet (81 mg) by mouth daily for 19 days  Qty: 19 tablet, Refills: 0    Associated Diagnoses: Cerebrovascular accident (CVA), unspecified mechanism (H)      atorvastatin (LIPITOR) 40 MG tablet Take 1 tablet (40 mg) by mouth every evening  Qty: 30 tablet, Refills: 0    Associated Diagnoses: Cerebrovascular accident (CVA), unspecified mechanism (H)      clopidogrel (PLAVIX) 75 MG tablet Take 1 tablet (75 mg) by mouth daily for 19 days  Qty: 19 tablet, Refills: 0    Associated Diagnoses: Cerebrovascular accident (CVA), unspecified mechanism (H)           Allergies   No Known Allergies  Data   Most Recent 3 CBC's:  Recent Labs   Lab Test 07/22/23 1914   WBC 9.3   HGB 15.9   MCV 91         Most Recent 3 BMP's:  Recent Labs   Lab Test 07/23/23  2359 07/23/23  1715 07/23/23  1158 07/23/23  0718 07/22/23 1914   NA  --   --   --   --  143   POTASSIUM  --   --   --   --  4.0   CHLORIDE  --   --   --   --  105   CO2  --   --   --   --  25   BUN  --   --   --   --  8.1   CR  --   --   --   --  1.14   ANIONGAP  --   --   --   --  13   SOFI  --   --   --   --  9.8   GLC 99 92 96   < > 80    < > = values in this interval not displayed.     Most Recent 2 LFT's:  Recent Labs   Lab Test 07/22/23 1914   AST 23   ALT 33   ALKPHOS 94   BILITOTAL 0.4     Most Recent INR's and Anticoagulation Dosing History:  Anticoagulation Dose History          Latest Ref Rng & Units 7/22/2023 7/23/2023   Recent Dosing and Labs   INR 0.85 - 1.15 0.96  1.05      Most Recent 3 Troponin's:No lab results found.  Most Recent Cholesterol Panel:  Recent Labs   Lab Test 07/22/23 1914   CHOL 195   *   HDL 41   TRIG 256*     Most Recent 6 Bacteria Isolates From Any Culture (See EPIC Reports for Culture Details):No lab results found.  Most Recent TSH, T4 and A1c Labs:  Recent Labs   Lab Test 07/22/23 1914   TSH 2.32    A1C 5.1     Results for orders placed or performed during the hospital encounter of 07/22/23   CT Head w/o Contrast    Narrative    EXAM: CT HEAD W/O CONTRAST, CT HEAD PERFUSION W CONTRAST, CTA HEAD NECK W CONTRAST  LOCATION: [facility]  DATE: 7/22/2023    INDICATION: Right-sided numbness.  COMPARISON: None.  TECHNIQUE: Head and neck CT angiogram with IV contrast. Noncontrast head CT followed by axial helical CT images of the head and neck vessels obtained during the arterial phase of intravenous contrast administration. Axial 2D reconstructed images and   multiplanar 3D MIP reconstructed images of the head and neck vessels were performed by the technologist. Additional CT cerebral perfusion was performed utilizing a second contrast bolus. Perfusion data were post processed with generation of standard   perfusion maps and estimation of ischemic/infarcted volumes utilizing standard threshold values. Dose reduction techniques were used. All stenosis measurements made according to NASCET criteria unless otherwise specified.  CONTRAST: 50mL Isovue 370     (accession [id number]), 75mL Isovue 370     (accession [id number])    FINDINGS:   NONCONTRAST HEAD CT:   INTRACRANIAL CONTENTS: No intracranial hemorrhage, extraaxial collection, or mass effect.  No CT evidence of acute infarct. Normal parenchymal attenuation. Normal ventricles and sulci.     VISUALIZED ORBITS/SINUSES/MASTOIDS: No intraorbital abnormality. No paranasal sinus mucosal disease. No middle ear or mastoid effusion.    BONES/SOFT TISSUES: No acute abnormality.    HEAD CTA:  ANTERIOR CIRCULATION: No stenosis/occlusion, aneurysm, or high flow vascular malformation. Standard [place] anatomy.    POSTERIOR CIRCULATION: No stenosis/occlusion, aneurysm, or high flow vascular malformation. Balanced vertebral arteries supply a normal basilar artery.     DURAL VENOUS SINUSES: Expected enhancement of the major dural venous  sinuses.    NECK CTA:  RIGHT CAROTID: No measurable stenosis or dissection.    LEFT CAROTID: No measurable stenosis or dissection.    VERTEBRAL ARTERIES: No focal stenosis or dissection. Balanced vertebral arteries.    AORTIC ARCH: Classic aortic arch anatomy with no significant stenosis at the origin of the great vessels.    NONVASCULAR STRUCTURES: Unremarkable.    CT PERFUSION:  PERFUSION MAPS: There is a mild delay in transit time to the posterior left frontal lobe but there is fairly symmetric cerebral blood flow and blood volume involving both cerebral hemispheres.    RAPID ANALYSIS:  CBF<30%: 0ml  Tmax>6sec: 9 ml  Mismatch volume: 9 ml  Mismatch ratio: infinite      Impression    IMPRESSION:   HEAD CT:  1.  No CT finding of a mass, hemorrhage or focal area suggestive of acute infarct.    HEAD CTA:   1.  No discrete vessel occlusion, significant stenosis, aneurysm or high flow vascular malformation involving the arteries of the [place] of [place].    NECK CTA:  1.  Normal configuration of the great vessels off the aortic arch with no significant stenosis of their origins.  2.  No significant stenosis or irregularity involving the arteries of the neck by NASCET criteria.  3.  No radiographic evidence of dissection.    CT PERFUSION:  1.  Symmetric cerebral blood flow and volume to the cerebral hemispheres bilaterally but there is a mild delay in mean transit time to the posterior left frontal lobe.  2.  Recommend MRI the brain without and with contrast for further evaluation.    Head CT findings were communicated by phone to Dr. Alston at 7:51 PM. CTA findings were communicated to Dr. Alston at 8:07 PM on 07/22/2023.   CTA Head Neck with Contrast    Narrative    EXAM: CT HEAD W/O CONTRAST, CT HEAD PERFUSION W CONTRAST, CTA HEAD NECK W CONTRAST  LOCATION: [facility]  DATE: 7/22/2023    INDICATION: Right-sided numbness.  COMPARISON: None.  TECHNIQUE: Head and neck CT angiogram with IV contrast.  Noncontrast head CT followed by axial helical CT images of the head and neck vessels obtained during the arterial phase of intravenous contrast administration. Axial 2D reconstructed images and   multiplanar 3D MIP reconstructed images of the head and neck vessels were performed by the technologist. Additional CT cerebral perfusion was performed utilizing a second contrast bolus. Perfusion data were post processed with generation of standard   perfusion maps and estimation of ischemic/infarcted volumes utilizing standard threshold values. Dose reduction techniques were used. All stenosis measurements made according to NASCET criteria unless otherwise specified.  CONTRAST: 50mL Isovue 370     (accession [id number]), 75mL Isovue 370     (accession [id number])    FINDINGS:   NONCONTRAST HEAD CT:   INTRACRANIAL CONTENTS: No intracranial hemorrhage, extraaxial collection, or mass effect.  No CT evidence of acute infarct. Normal parenchymal attenuation. Normal ventricles and sulci.     VISUALIZED ORBITS/SINUSES/MASTOIDS: No intraorbital abnormality. No paranasal sinus mucosal disease. No middle ear or mastoid effusion.    BONES/SOFT TISSUES: No acute abnormality.    HEAD CTA:  ANTERIOR CIRCULATION: No stenosis/occlusion, aneurysm, or high flow vascular malformation. Standard [place] of [place] anatomy.    POSTERIOR CIRCULATION: No stenosis/occlusion, aneurysm, or high flow vascular malformation. Balanced vertebral arteries supply a normal basilar artery.     DURAL VENOUS SINUSES: Expected enhancement of the major dural venous sinuses.    NECK CTA:  RIGHT CAROTID: No measurable stenosis or dissection.    LEFT CAROTID: No measurable stenosis or dissection.    VERTEBRAL ARTERIES: No focal stenosis or dissection. Balanced vertebral arteries.    AORTIC ARCH: Classic aortic arch anatomy with no significant stenosis at the origin of the great vessels.    NONVASCULAR STRUCTURES: Unremarkable.    CT PERFUSION:  PERFUSION MAPS: There  is a mild delay in transit time to the posterior left frontal lobe but there is fairly symmetric cerebral blood flow and blood volume involving both cerebral hemispheres.    RAPID ANALYSIS:  CBF<30%: 0ml  Tmax>6sec: 9 ml  Mismatch volume: 9 ml  Mismatch ratio: infinite      Impression    IMPRESSION:   HEAD CT:  1.  No CT finding of a mass, hemorrhage or focal area suggestive of acute infarct.    HEAD CTA:   1.  No discrete vessel occlusion, significant stenosis, aneurysm or high flow vascular malformation involving the arteries of the [place] of [place].    NECK CTA:  1.  Normal configuration of the great vessels off the aortic arch with no significant stenosis of their origins.  2.  No significant stenosis or irregularity involving the arteries of the neck by NASCET criteria.  3.  No radiographic evidence of dissection.    CT PERFUSION:  1.  Symmetric cerebral blood flow and volume to the cerebral hemispheres bilaterally but there is a mild delay in mean transit time to the posterior left frontal lobe.  2.  Recommend MRI the brain without and with contrast for further evaluation.    Head CT findings were communicated by phone to Dr. Alston at 7:51 PM. CTA findings were communicated to Dr. Alston at 8:07 PM on 07/22/2023.   CT Head Perfusion w Contrast - For Tier 2 Stroke    Narrative    EXAM: CT HEAD W/O CONTRAST, CT HEAD PERFUSION W CONTRAST, CTA HEAD NECK W CONTRAST  LOCATION: [facility]  DATE: 7/22/2023    INDICATION: Right-sided numbness.  COMPARISON: None.  TECHNIQUE: Head and neck CT angiogram with IV contrast. Noncontrast head CT followed by axial helical CT images of the head and neck vessels obtained during the arterial phase of intravenous contrast administration. Axial 2D reconstructed images and   multiplanar 3D MIP reconstructed images of the head and neck vessels were performed by the technologist. Additional CT cerebral perfusion was performed utilizing a second contrast bolus.  Perfusion data were post processed with generation of standard   perfusion maps and estimation of ischemic/infarcted volumes utilizing standard threshold values. Dose reduction techniques were used. All stenosis measurements made according to NASCET criteria unless otherwise specified.  CONTRAST: 50mL Isovue 370     (accession [id number]), 75mL Isovue 370     (accession [id number])    FINDINGS:   NONCONTRAST HEAD CT:   INTRACRANIAL CONTENTS: No intracranial hemorrhage, extraaxial collection, or mass effect.  No CT evidence of acute infarct. Normal parenchymal attenuation. Normal ventricles and sulci.     VISUALIZED ORBITS/SINUSES/MASTOIDS: No intraorbital abnormality. No paranasal sinus mucosal disease. No middle ear or mastoid effusion.    BONES/SOFT TISSUES: No acute abnormality.    HEAD CTA:  ANTERIOR CIRCULATION: No stenosis/occlusion, aneurysm, or high flow vascular malformation. Standard [place] of [place] anatomy.    POSTERIOR CIRCULATION: No stenosis/occlusion, aneurysm, or high flow vascular malformation. Balanced vertebral arteries supply a normal basilar artery.     DURAL VENOUS SINUSES: Expected enhancement of the major dural venous sinuses.    NECK CTA:  RIGHT CAROTID: No measurable stenosis or dissection.    LEFT CAROTID: No measurable stenosis or dissection.    VERTEBRAL ARTERIES: No focal stenosis or dissection. Balanced vertebral arteries.    AORTIC ARCH: Classic aortic arch anatomy with no significant stenosis at the origin of the great vessels.    NONVASCULAR STRUCTURES: Unremarkable.    CT PERFUSION:  PERFUSION MAPS: There is a mild delay in transit time to the posterior left frontal lobe but there is fairly symmetric cerebral blood flow and blood volume involving both cerebral hemispheres.    RAPID ANALYSIS:  CBF<30%: 0ml  Tmax>6sec: 9 ml  Mismatch volume: 9 ml  Mismatch ratio: infinite      Impression    IMPRESSION:   HEAD CT:  1.  No CT finding of a mass, hemorrhage or focal area suggestive of  acute infarct.    HEAD CTA:   1.  No discrete vessel occlusion, significant stenosis, aneurysm or high flow vascular malformation involving the arteries of the [place].    NECK CTA:  1.  Normal configuration of the great vessels off the aortic arch with no significant stenosis of their origins.  2.  No significant stenosis or irregularity involving the arteries of the neck by NASCET criteria.  3.  No radiographic evidence of dissection.    CT PERFUSION:  1.  Symmetric cerebral blood flow and volume to the cerebral hemispheres bilaterally but there is a mild delay in mean transit time to the posterior left frontal lobe.  2.  Recommend MRI the brain without and with contrast for further evaluation.    Head CT findings were communicated by phone to Dr. Alston at 7:51 PM. CTA findings were communicated to Dr. Alston at 8:07 PM on 07/22/2023.   MR Brain w/o & w Contrast    Addendum: 7/22/2023    Dr. Drew Ulrich  was contacted by me on 7/22/2023 10:41 PM CDT.        Narrative    EXAM: MR BRAIN W/O and W CONTRAST  LOCATION: [facility]  DATE: 7/22/2023    INDICATION: Developed intermittent right upper extremity clumsiness and numbness with speech difficulty at 2 PM   COMPARISON:  Same day CTA head.  CONTRAST: 8 mL Gadavist   TECHNIQUE: Routine multiplanar multisequence head MRI without and with intravenous contrast.    FINDINGS:  INTRACRANIAL CONTENTS: Small acute left MCA territory infarct with involvement of the postcentral gyrus. There late acute infarcts in the left MCA distribution involving the left middle frontal gyrus and posterior insula. No mass, acute hemorrhage, or   extra-axial fluid collections.  No hydrocephalus. Normal position of the cerebellar tonsils. No pathologic contrast enhancement.    SELLA: No abnormality accounting for technique.    OSSEOUS STRUCTURES/SOFT TISSUES: Normal marrow signal. The major intracranial vascular flow voids are maintained.     ORBITS: No  abnormality accounting for technique.     SINUSES/MASTOIDS: No paranasal sinus mucosal disease. No middle ear or mastoid effusion.       Impression    IMPRESSION:  1.  Small acute and late acute left MCA territory infarcts.  2.  No hemorrhagic conversion or significant mass effect.     Most Recent 3 CBC's:  Recent Labs   Lab Test 07/22/23 1914   WBC 9.3   HGB 15.9   MCV 91        Most Recent 3 BMP's:  Recent Labs   Lab Test 07/23/23  2359 07/23/23  1715 07/23/23  1158 07/23/23  0718 07/22/23 1914   NA  --   --   --   --  143   POTASSIUM  --   --   --   --  4.0   CHLORIDE  --   --   --   --  105   CO2  --   --   --   --  25   BUN  --   --   --   --  8.1   CR  --   --   --   --  1.14   ANIONGAP  --   --   --   --  13   SOFI  --   --   --   --  9.8   GLC 99 92 96   < > 80    < > = values in this interval not displayed.     Most Recent 2 LFT's:  Recent Labs   Lab Test 07/22/23 1914   AST 23   ALT 33   ALKPHOS 94   BILITOTAL 0.4

## 2023-07-24 NOTE — PROVIDER NOTIFICATION
MD Notification    Notified Person: MD    Notified Person Name: Nghia Sinha    Notification Date/Time: 7/24/23 1630    Notification Interaction: Vocera    Purpose of Notification: Advise that pt decided to stay and have the MARIA TERESA done while IP.    Orders Received:    Comments:

## 2023-07-24 NOTE — PROGRESS NOTES
Care Management Discharge Note    Discharge Date: 07/24/2023       Discharge Disposition: Home, Outpatient Rehab (PT, OT, SLP, Cardiac or Pulmonary)    Discharge Services: None    Discharge DME:      Discharge Transportation:      Private pay costs discussed: Not applicable    Does the patient's insurance plan have a 3 day qualifying hospital stay waiver?  No    PAS Confirmation Code:    Patient/family educated on Medicare website which has current facility and service quality ratings:      Education Provided on the Discharge Plan:    Persons Notified of Discharge Plans:   Patient/Family in Agreement with the Plan:      Handoff Referral Completed: No    Additional Information:  Patient has been set up with a new PCP at [facility] on Monday July 31 at 3:10 pm with Dr. Guillen.  No other care management needs identified.    Kamille Canales RN

## 2023-07-24 NOTE — CONSULTS
Care Management Initial Consult    General Information  Assessment completed with: Patient,    Type of CM/SW Visit: Initial Assessment    Primary Care Provider verified and updated as needed: Yes (no PCP, needs to set up with a new PCP)   Readmission within the last 30 days: no previous admission in last 30 days      Reason for Consult: discharge planning  Advance Care Planning:            Communication Assessment  Patient's communication style: spoken language (English or Bilingual)    Hearing Difficulty or Deaf: no        Cognitive  Cognitive/Neuro/Behavioral: WDL  Level of Consciousness: alert  Arousal Level: opens eyes spontaneously  Orientation: oriented x 4  Mood/Behavior: calm, cooperative  Best Language: 0 - No aphasia  Speech: clear, spontaneous, logical    Living Environment:   People in home: parent(s)  Araceli Kern, mother  Current living Arrangements: house      Able to return to prior arrangements:         Family/Social Support:  Care provided by:    Provides care for:    Marital Status: Single             Description of Support System:           Current Resources:   Patient receiving home care services: No     Community Resources: None  Equipment currently used at home:    Supplies currently used at home:      Employment/Financial:  Employment Status: employed full-time        Financial Concerns:             Does the patient's insurance plan have a 3 day qualifying hospital stay waiver?  No    Lifestyle & Psychosocial Needs:  Social Determinants of Health     Tobacco Use: Not on file   Alcohol Use: Not on file   Financial Resource Strain: Not on file   Food Insecurity: Not on file   Transportation Needs: Not on file   Physical Activity: Not on file   Stress: Not on file   Social Connections: Not on file   Intimate Partner Violence: Not on file   Depression: Not on file   Housing Stability: Not on file       Functional Status:  Prior to admission patient needed assistance:   Dependent ADLs::  Independent  Dependent IADLs:: IndependentMet        Mental Health Status:          Chemical Dependency Status:                Values/Beliefs:  Spiritual, Cultural Beliefs, [employer] Practices, Values that affect care:                 Additional Information:  Met with patient at bedside.  Explained role in discharge planning.  Patient needed help in setting up with new PCP and was agreeable in seeing someone in the [facility].  New appointment set up for Monday July 31st at 3:10 pm with Dr. Guillen; appointment is on the AVS.  No further care management needs identified.    Kamille Canales RN

## 2023-07-24 NOTE — PLAN OF CARE
Summary:  7/23/23-7/24/23 [id number]    Admitted for an acute ischemic stroke of left MCA territory on 7/22/2023.  A&OX4. Neuros intact. VSS on RA. Tele - NSR. Regular diet, takes pills whole. Independent, denies pain. Pt scoring green on the Aggression Stop Light Tool.  Plan: Awaiting ECHO. Discharge possibly today, patient to discharge with cardiac event monitor.

## 2023-07-24 NOTE — PROGRESS NOTES
Spoke with Lucretia RN- plan to keep patient NPO after midnight, can sign own consent, and has functioning PIV.  Care suites to call floor regarding time and place of MARIA TERESA in AM.

## 2023-07-24 NOTE — PLAN OF CARE
Reason for Admission: L MCA CVA    Cognitive/Mentation: A/Ox 4  Neuros/CMS: Intact   VS: VSS on RA  Tele: NSR  GI: Continent.  : Continent.  Pulmonary: LS clear  Pain: denies    Drains/Lines: PIV SL  Skin: intact  Activity: Independent  Diet: Regular, pills whole with water.     Therapies recs: home  Discharge: pending    Aggression Stoplight Tool: Green    End of shift summary: ECHO complete. MARIA TERESA recommended. Pt deciding if he wants to stay to do it. If he decides to stay, will be NPO for MARIA TERESA tomorrow. If pt decides to leave will need outpt MARIA TERESA ordered per stroke neurology.

## 2023-07-25 ENCOUNTER — HOSPITAL ENCOUNTER (OUTPATIENT)
Dept: CARDIOLOGY | Facility: CLINIC | Age: 48
Discharge: HOME | End: 2023-07-25
Attending: PHYSICIAN ASSISTANT | Admitting: PHYSICIAN ASSISTANT
Payer: COMMERCIAL

## 2023-07-25 ENCOUNTER — APPOINTMENT (OUTPATIENT)
Dept: CARDIOLOGY | Facility: CLINIC | Age: 48
DRG: 066 | End: 2023-07-25
Attending: PHYSICIAN ASSISTANT
Payer: COMMERCIAL

## 2023-07-25 VITALS
DIASTOLIC BLOOD PRESSURE: 86 MMHG | TEMPERATURE: 98.4 F | SYSTOLIC BLOOD PRESSURE: 122 MMHG | HEIGHT: 71 IN | BODY MASS INDEX: 25.2 KG/M2 | HEART RATE: 71 BPM | OXYGEN SATURATION: 96 % | WEIGHT: 180 LBS | RESPIRATION RATE: 22 BRPM

## 2023-07-25 DIAGNOSIS — I63.9 STROKE (H): ICD-10-CM

## 2023-07-25 DIAGNOSIS — I48.91 ATRIAL FIBRILLATION (H): ICD-10-CM

## 2023-07-25 PROBLEM — G45.9 TIA (TRANSIENT ISCHEMIC ATTACK): Status: RESOLVED | Noted: 2023-07-22 | Resolved: 2023-07-25

## 2023-07-25 LAB — LVEF ECHO: NORMAL

## 2023-07-25 PROCEDURE — 93272 ECG/REVIEW INTERPRET ONLY: CPT | Performed by: INTERNAL MEDICINE

## 2023-07-25 PROCEDURE — 99232 SBSQ HOSP IP/OBS MODERATE 35: CPT | Performed by: PHYSICIAN ASSISTANT

## 2023-07-25 PROCEDURE — 258N000003 HC RX IP 258 OP 636: Performed by: INTERNAL MEDICINE

## 2023-07-25 PROCEDURE — 250N000013 HC RX MED GY IP 250 OP 250 PS 637: Performed by: EMERGENCY MEDICINE

## 2023-07-25 PROCEDURE — 93320 DOPPLER ECHO COMPLETE: CPT | Mod: 26 | Performed by: INTERNAL MEDICINE

## 2023-07-25 PROCEDURE — 250N000011 HC RX IP 250 OP 636: Performed by: INTERNAL MEDICINE

## 2023-07-25 PROCEDURE — 93325 DOPPLER ECHO COLOR FLOW MAPG: CPT

## 2023-07-25 PROCEDURE — 93325 DOPPLER ECHO COLOR FLOW MAPG: CPT | Mod: 26 | Performed by: INTERNAL MEDICINE

## 2023-07-25 PROCEDURE — 99239 HOSP IP/OBS DSCHRG MGMT >30: CPT | Performed by: INTERNAL MEDICINE

## 2023-07-25 PROCEDURE — 250N000013 HC RX MED GY IP 250 OP 250 PS 637: Performed by: HOSPITALIST

## 2023-07-25 PROCEDURE — 93270 REMOTE 30 DAY ECG REV/REPORT: CPT

## 2023-07-25 PROCEDURE — 999N000184 HC STATISTIC TELEMETRY

## 2023-07-25 PROCEDURE — 999N000183 HC STATISTIC TEE INCLUDES SEDATION

## 2023-07-25 PROCEDURE — 250N000009 HC RX 250: Performed by: INTERNAL MEDICINE

## 2023-07-25 PROCEDURE — 93312 ECHO TRANSESOPHAGEAL: CPT | Mod: 26 | Performed by: INTERNAL MEDICINE

## 2023-07-25 RX ORDER — GLYCOPYRROLATE 0.2 MG/ML
0.1 INJECTION, SOLUTION INTRAMUSCULAR; INTRAVENOUS ONCE
Status: DISCONTINUED | OUTPATIENT
Start: 2023-07-25 | End: 2023-07-25

## 2023-07-25 RX ORDER — SODIUM CHLORIDE 9 MG/ML
INJECTION, SOLUTION INTRAVENOUS CONTINUOUS PRN
Status: DISCONTINUED | OUTPATIENT
Start: 2023-07-25 | End: 2023-07-25

## 2023-07-25 RX ORDER — FENTANYL CITRATE 50 UG/ML
50 INJECTION, SOLUTION INTRAMUSCULAR; INTRAVENOUS ONCE
Status: DISCONTINUED | OUTPATIENT
Start: 2023-07-25 | End: 2023-07-25

## 2023-07-25 RX ORDER — LIDOCAINE 50 MG/G
OINTMENT TOPICAL ONCE
Status: DISCONTINUED | OUTPATIENT
Start: 2023-07-25 | End: 2023-07-25

## 2023-07-25 RX ORDER — NALOXONE HYDROCHLORIDE 0.4 MG/ML
0.2 INJECTION, SOLUTION INTRAMUSCULAR; INTRAVENOUS; SUBCUTANEOUS
Status: DISCONTINUED | OUTPATIENT
Start: 2023-07-25 | End: 2023-07-25

## 2023-07-25 RX ORDER — FENTANYL CITRATE 50 UG/ML
25 INJECTION, SOLUTION INTRAMUSCULAR; INTRAVENOUS
Status: DISCONTINUED | OUTPATIENT
Start: 2023-07-25 | End: 2023-07-25

## 2023-07-25 RX ORDER — NALOXONE HYDROCHLORIDE 0.4 MG/ML
0.4 INJECTION, SOLUTION INTRAMUSCULAR; INTRAVENOUS; SUBCUTANEOUS
Status: DISCONTINUED | OUTPATIENT
Start: 2023-07-25 | End: 2023-07-25

## 2023-07-25 RX ORDER — LIDOCAINE 40 MG/G
CREAM TOPICAL
Status: DISCONTINUED | OUTPATIENT
Start: 2023-07-25 | End: 2023-07-25

## 2023-07-25 RX ORDER — LIDOCAINE HYDROCHLORIDE 40 MG/ML
1.5 SOLUTION TOPICAL ONCE
Status: DISCONTINUED | OUTPATIENT
Start: 2023-07-25 | End: 2023-07-25

## 2023-07-25 RX ORDER — DEXTROSE MONOHYDRATE 25 G/50ML
9.5 INJECTION, SOLUTION INTRAVENOUS
Status: DISCONTINUED | OUTPATIENT
Start: 2023-07-25 | End: 2023-07-25

## 2023-07-25 RX ORDER — FLUMAZENIL 0.1 MG/ML
0.2 INJECTION, SOLUTION INTRAVENOUS
Status: DISCONTINUED | OUTPATIENT
Start: 2023-07-25 | End: 2023-07-25

## 2023-07-25 RX ADMIN — SODIUM CHLORIDE: 9 INJECTION, SOLUTION INTRAVENOUS at 13:41

## 2023-07-25 RX ADMIN — MIDAZOLAM 1 MG: 1 INJECTION INTRAMUSCULAR; INTRAVENOUS at 14:49

## 2023-07-25 RX ADMIN — FENTANYL CITRATE 50 MCG: 50 INJECTION, SOLUTION INTRAMUSCULAR; INTRAVENOUS at 14:49

## 2023-07-25 RX ADMIN — LIDOCAINE: 50 OINTMENT TOPICAL at 14:04

## 2023-07-25 RX ADMIN — MIDAZOLAM 1 MG: 1 INJECTION INTRAMUSCULAR; INTRAVENOUS at 14:45

## 2023-07-25 RX ADMIN — MIDAZOLAM 3 MG: 1 INJECTION INTRAMUSCULAR; INTRAVENOUS at 14:42

## 2023-07-25 RX ADMIN — CLOPIDOGREL BISULFATE 75 MG: 75 TABLET ORAL at 16:03

## 2023-07-25 RX ADMIN — GLYCOPYRROLATE 0.1 MG: 0.2 INJECTION, SOLUTION INTRAMUSCULAR; INTRAVENOUS at 14:02

## 2023-07-25 RX ADMIN — FENTANYL CITRATE 100 MCG: 50 INJECTION, SOLUTION INTRAMUSCULAR; INTRAVENOUS at 14:42

## 2023-07-25 RX ADMIN — TOPICAL ANESTHETIC 0.5 ML: 200 SPRAY DENTAL; PERIODONTAL at 14:40

## 2023-07-25 RX ADMIN — ASPIRIN 81 MG 81 MG: 81 TABLET ORAL at 16:04

## 2023-07-25 ASSESSMENT — ACTIVITIES OF DAILY LIVING (ADL)
ADLS_ACUITY_SCORE: 18

## 2023-07-25 NOTE — PROGRESS NOTES
[facility]    Stroke Progress Note    Interval Events  MARIA TERESA today was without concerning pathology. Updated patient/family at bedside. No changes on neuro exam following procedure.    HPI Summary  Mundo Kern is a 48 year old male with PMH tobacco use, has not seen a doctor in >10 years, presented with R hand numbness and clumsiness onset 7/22 afternoon. Also noted a brief episode of loss of balance and dizziness. Symtpoms had resolved by the time of evaluation in the ED.    TTE with no clear embolic source for stroke. No acute events since yesterday. Discussed recommendation for MARIA TERESA to complete work up for stroke in the young. He does not want to stay another night and is leaning toward going home. If so recommend MARIA TERESA be done as an outpatient.     Stroke Evaluation Summarized     MRI/Head CT MRI: acute/early subacute small L MCA territory infarcts   Intracranial Vasculature CTA head: no LVO, no significant stenosis   Cervical Vasculature CTA neck: no significant stenosis      Echocardiogram MARIA TERESA: LV norm, EF 60-65%, no wma, no LV thrombus, RV norm, normal LA, no mass/thrombus in ANUJA, RA normal, trace MR, AV trileaflet, SR    TTE: EF 55-60%, grossly normal wall motion, cannot rule out small area of basal to mid inferior wall hypokinesis, normal LA size, negative bubble study   EKG/Telemetry SR   Other Testing CTP: Symmetric cerebral blood flow and volume to the cerebral hemispheres bilaterally but there is a mild delay in mean transit time to the posterior left frontal lobe.     Hypercoagulable labs:   Lupus anticoagulant: negative  Protein C: 100  Protein S: 150  Factor 2/5: pending  Cardiolipin Ab: negative  B2 glycoprotein Ab: negative  Antithrombin III: negative    Etoh: <0.01      LDL  7/22/2023: 103 mg/dL   A1C  7/22/2023: 5.1 %   Troponin 7/23/2023: <6 ng/L         Impression  Scattered acute ischemic strokes of L MCA territory due to embolic stroke of undetermined source  "(ESUS)        Recommendations   Acute Stroke Management:  -Neuro checks and vitals every 4 hours  - Inpatient SBP goal <180   -ASA 81 mg daily x21 days then  mg indefinitely  - Plavix 25 mg daily x21 days  -Lipitor 40 mg daily  -telemetry, 30 day CardioNet monitoring at discharge if no Afib found on telemetry (ordered)  -PT/OT/SPT have evaluated  -Euthermia, euglycemia, eunatremia  -Stroke Education  -Stroke Class per [facility]    Secondary stroke prevention:  -follow-up with PCP for titration to goal LDL 40-70, <40 increases risk of Intracranial hemorrhage  -Recommend smoking cessation  -Mediterranean diet can be beneficial for overall decreased cardiovascular risk, please print hand out for patient at discharge   -goal HgbA1c <7% for secondary stroke prevention, follow-up with PCP  -long term outpatient blood pressure goal <130/80, recommend home monitoring twice daily in AM and PM, keep log and bring to PCP follow-up      Discussed with vascular neurology attending, Dr. Smiley      Patient Follow-up    - in the next 1-2 week(s) with PCP - needs to establish care  - in 6-8 weeks with general neurology ([phone]) (ordered)  -if any positive results of remaining hypercoagulable work-up then can be referred to vascular medicine at stroke follow-up      No further stroke evaluation is recommended, so we will sign off. Please contact us with any additional questions.    Clementina Kaba PA-C  Vascular Neurology    To page me or covering stroke neurology team member, click here: [facility]  Choose \"On Call\" tab at top, then select \"NEUROLOGY/ALL SITES\" from middle drop-down box, press Enter, then look for \"stroke\" or \"telestroke\" for your site.  ______________________________________________________    Clinically Significant Risk Factors Present on Admission                       # Overweight: Estimated body mass index is 25.1 kg/m  as calculated from the following:    Height as of this " "encounter: 1.803 m (5' 11\").    Weight as of this encounter: 81.6 kg (180 lb).            Medications   Scheduled Meds   aspirin  81 mg Oral Daily    atorvastatin  40 mg Oral QPM    clopidogrel  75 mg Oral Daily    sodium chloride (PF)  3 mL Intracatheter Q8H       Infusion Meds   - MEDICATION INSTRUCTIONS -      - MEDICATION INSTRUCTIONS -         PRN Meds  lidocaine 4%, lidocaine (buffered or not buffered), - MEDICATION INSTRUCTIONS -, - MEDICATION INSTRUCTIONS -, sodium chloride (PF)       PHYSICAL EXAMINATION  Temp:  [98  F (36.7  C)-98.5  F (36.9  C)] 98.2  F (36.8  C)  Pulse:  [71-95] 77  Resp:  [16-18] 16  BP: (120-154)/() 132/95  SpO2:  [92 %-98 %] 97 %      General Exam  General:  patient lying in bed without any acute distress    HEENT:  normocephalic/atraumatic  Pulmonary:  no respiratory distress    Neuro Exam  Mental Status:  alert, oriented x 3, follows commands, speech clear and fluent, naming and repetition normal  Cranial Nerves:  visual fields intact, PERRL, EOMI with normal smooth pursuit, facial sensation intact and symmetric, facial movements symmetric, hearing not formally tested but intact to conversation, no dysarthria, tongue protrusion midline  Motor:  normal muscle tone and bulk, no abnormal movements, able to move all limbs spontaneously, strength 5/5 throughout upper and lower extremities, no pronator drift  Reflexes:  toes down-going  Sensory:  light touch sensation intact and symmetric throughout upper and lower extremities, no extinction on double simultaneous stimulation   Coordination:  normal finger-to-nose and heel-to-shin bilaterally without dysmetria  Station/Gait:  deferred    Stroke Scales    NIHSS  1a. Level of Consciousness 0-->Alert, keenly responsive   1b. LOC Questions 0-->Answers both questions correctly   1c. LOC Commands 0-->Performs both tasks correctly   2.   Best Gaze 0-->Normal   3.   Visual 0-->No visual loss   4.   Facial Palsy 0-->Normal symmetrical " movements   5a. Motor Arm, Left 0-->No drift, limb holds 90 (or 45) degrees for full 10 secs   5b. Motor Arm, Right 0-->No drift, limb holds 90 (or 45) degrees for full 10 secs   6a. Motor Leg, Left 0-->No drift, leg holds 30 degree position for full 5 secs   6b. Motor Leg, right 0-->No drift, leg holds 30 degree position for full 5 secs   7.   Limb Ataxia 0-->Absent   8.   Sensory 0-->Normal, no sensory loss   9.   Best Language 0-->No aphasia, normal   10. Dysarthria 0-->Normal   11. Extinction and Inattention  0-->No abnormality   Total 0 (07/25/23 1616)       Modified [place] Score (Pre-morbid)  0 - No symptoms.  Modified [place] Score (Discharge)  0 - No symptoms.    Imaging  I personally reviewed all imaging; relevant findings per HPI.     Lab Results Data   CBC  Recent Labs   Lab 07/22/23 1914   WBC 9.3   RBC 4.99   HGB 15.9   HCT 45.6        Basic Metabolic Panel    Recent Labs   Lab 07/23/23  2359 07/23/23 1715 07/23/23  1158 07/23/23  0718 07/22/23 1914   NA  --   --   --   --  143   POTASSIUM  --   --   --   --  4.0   CHLORIDE  --   --   --   --  105   CO2  --   --   --   --  25   BUN  --   --   --   --  8.1   CR  --   --   --   --  1.14   GLC 99 92 96   < > 80   SOFI  --   --   --   --  9.8    < > = values in this interval not displayed.     Liver Panel  Recent Labs   Lab 07/22/23 1914   PROTTOTAL 6.9   ALBUMIN 4.5   BILITOTAL 0.4   ALKPHOS 94   AST 23   ALT 33     INR    Recent Labs   Lab Test 07/23/23 1714 07/22/23 1914   INR 1.05 0.96      Lipid Profile    Recent Labs   Lab Test 07/22/23 1914   CHOL 195   HDL 41   *   TRIG 256*     A1C    Recent Labs   Lab Test 07/22/23 1914   A1C 5.1     Troponin    Recent Labs   Lab 07/23/23  0124 07/22/23 1914   CTROPT <6 <6          Data   I have personally spent a total of 45 minutes providing care today, time spent in reviewing medical records and reviewing tests, examining the patient and obtaining history, coordination of care, and  discussion with the patient and/or family regarding diagnostic results, prognosis, symptom management, risks and benefits of management options, and development of plan of care. Greater than 50% was spent in counseling and coordination of care.

## 2023-07-25 NOTE — CONSULTS
Stroke Education Note    The following information has been reviewed with the patient:    1. Warning signs of stroke    2. Calling 911 if having warning signs of stroke    3. All modifiable risk factors: hypertension, CAD, atrial fib, diabetes, hypercholesterolemia, smoking, substance abuse, diet, physical inactivity, obesity, sleep apnea.    4. Patient's risk factors for stroke which include: smoking, diet    5. Follow-up plan for after discharge    6. Discharge medications which include: aspirin, Plavix, Lipitor    In addition, the above information was given to the patient in writing as a part of the [facility] Stroke Class Handout.    Learner's response to risk factors / lifestyle modification education: Desire to change Ability to change Taking steps     Flaquita Macias RN

## 2023-07-25 NOTE — PLAN OF CARE
[facility]    [address]    [place] [address]    Phone:  [phone]       Thank You for choosing us for your health care visit. We are glad to serve you and happy to provide you with this summary of your visit. Please help us to ensure we have accurate records. If you find anything that needs to be changed, please let our staff know as soon as possible.          Your Demographic Information     Patient Name Sex     Roman Shultz Male 2001       Ethnic Group Patient Race    Not of  or  Origin White      Your Visit Details     Date & Time Provider Department    2017 4:00 PM MD Marilin [facility] [place]      Your To Do List     Follow-Up    Return in about 1 year (around 2018) for well child.      Conditions Discussed Today or Order-Related Diagnoses        Comments    Encounter for routine child health examination without abnormal findings    -  Primary       Your Vitals Were     BP Pulse Resp Height Weight BMI    102/60 (10 %/ 32 %)* 68 16 5' 9\" (1.753 m) (80 %, Z= 0.83)† 209 lb (94.8 kg) (>99 %, Z= 2.48)† 30.86 kg/m2 (98 %, Z= 2.13)†    Smoking Status                   Never Smoker         *BP percentiles are based on NHBPEP's 4th Report    †Growth percentiles are based on CDC 2-20 Years data.      Medications Prescribed or Re-Ordered Today     None      Allergies     Amoxicillin HIVES      Immunizations History as of 2017     Name Date    DTaP 2003    DTaP/HIB/IPV 2002, 2002    DTaP/IPV 2007, 2003    HEPATITIS A CHILD 2016, 2014    HIB 9/3/2003    Hepatitis B Child 9/3/2003, 2002, 2002    MMR 2003    MMRV 2007    Meningococcal Conjugate MCV4P (Menactra) 2014    Pneumococcal Conjugate 13 Valent 2003, 5/15/2003, 2003, 2002    Tdap 10/10/2013    Varicella 9/3/2003              Patient Instructions      Well-Child  Shift: [id number]  Goal Outcome Evaluation:      Plan of Care Reviewed With: patient    Overall Patient Progress: improvingOverall Patient Progress: improving    Reason for Admission: L MCA Stroke    Cognitive/Mentation: A/Ox 4.   Neuros/CMS: Intact.  VS: VSS. SBP goal<220.   Tele: NSR. Applied 30 day cardiac monitor at discharge.  GI: BS active, + flatus, last BM unknown. Continent.  : WDL. Continent.  Pulmonary: LS clear.  Pain: HA- pt states this is d/t not smoking, states he will just rest, no need for intervention.     Drains/Lines: R PIV removed for discharge.  Skin: Intact.  Activity: Independent.  Diet: Regular with thin liquids. Takes pills whole.     Therapies recs: Home  Discharge: Home    Aggression Stoplight Tool: GREEN    End of shift summary: Pt discharge home via private vehicle w/ mother Araceli. Educated pt and family on discharge summary/AVS, follow-up appointments, and medications. Answered all pt and family questions.         Checkup: 14 to 18 Years  During the teen years, it’s important to keep having yearly checkups. Your teen may be embarrassed about having a checkup. Reassure your teen that the exam is normal and necessary. Be aware that the health care provider may ask to talk with your child without you in the exam room.     Stay involved in your teen’s life. Make sure your teen knows you’re always there when he or she needs to talk.     School and social issues  Here are some topics you, your teen, and the health care provider may want to discuss during this visit:  · School performance. How is your child doing in school? Is homework finished on time? Does your child stay organized? These are skills you can help with. Keep in mind that a drop in school performance can be a sign of other problems.  · Friendships. Do you like your child’s friends? Do the friendships seem healthy? Make sure to talk to your teen about who his or her friends are and how they spend time together. Peer pressure can be a problem among teenagers.  · Life at home. How is your child’s behavior? Does he or she get along with others in the family? Is he or she respectful of you, other adults, and authority? Does your child participate in family events, or does he or she withdraw from other family members?  · Risky behaviors. Many teenagers are curious about drugs, alcohol, smoking, and sex. Talk openly about these issues. Answer your child’s questions, and don’t be afraid to ask questions of your own. If you’re not sure how to approach these topics, talk to the health care provider for advice.   Puberty  Your teen may still be experiencing some of the changes of puberty, such as:  · Acne and body odor. Hormones that increase during puberty can cause acne (pimples) on the face and body. Hormones can also increase sweating and cause a stronger body odor.  · Body changes. The body grows and matures during puberty. Hair will grow in the pubic area and on other  parts of the body. Girls grow breasts and menstruate (have monthly periods). A boy’s voice changes, becoming lower and deeper. As the penis matures, erections and wet dreams will start to happen. Talk to your teen about what to expect, and help him or her deal with these changes when possible.  · Emotional changes. Along with these physical changes, you’ll likely notice changes in your teen’s personality. He or she may develop an interest in dating and becoming “more than friends” with other kids. Also, it’s normal for your teen to be abbott. Try to be patient and consistent. Encourage conversations, even when he or she doesn’t seem to want to talk. No matter how your teen acts, he or she still needs a parent.  Nutrition and exercise tips  Your teenager likely makes his or her own decisions about what to eat and how to spend free time. You can’t always have the final say, but you can encourage healthy habits. Your teen should:  · Get at least 30 minutes to 60 minutes of physical activity every day. This time can be broken up throughout the day. After-school sports, dance or martial arts classes, riding a bike, or even walking to school or a friend’s house counts as activity.    · Limit “screen time” to 1 hour to 2 hours each day. This includes time spent watching TV, playing video games, using the computer, and texting. If your teen has a TV, computer, or video game console in the bedroom, consider replacing it with a music player.   · Eat healthy. Your child should eat fruits, vegetables, lean meats, and whole grains every day. Less healthy foods--like French fries, candy, and chips--should be eaten rarely. Some teens fall into the trap of snacking on junk food and fast food throughout the day. Make sure the kitchen is stocked with healthy options for after-school snacks. If your teen does choose to eat junk food, consider making him or her buy it with his or her own money.   · Eat 3 meals a day. Many kids skip  breakfast and even lunch. Not only is this unhealthy, it can also hurt school performance. Make sure your teen eats breakfast. If your teen does not like the food served at school for lunch, allow him or her to prepare a bag lunch.  · Have at least one family meal with you each day. Busy schedules often limit time for sitting and talking. Sitting and eating together allows for family time. It also lets you see what and how your child eats.   · Limit soda and juice drinks. A small soda is OK once in a while. But soda, sports drinks, and juice drinks are no substitute for healthier drinks. Sports and juice drinks are no better. Water and low-fat or nonfat milk are the best choices.  Hygiene tips  · Teenagers should bathe or shower daily and use deodorant.  · Let the health care provider know if you or your teen have questions about hygiene or acne.  · Bring your teen to the dentist at least twice a year for teeth cleaning and a checkup.  · Remind your teen to brush and floss his or her teeth before bed.  Sleeping tips  During the teen years, sleep patterns may change. Many teenagers have a hard time falling asleep, which can lead to sleeping late the next morning. Here are some tips to help your teen get the rest he or she needs:  · Encourage your teen to keep a consistent bedtime, even on weekends. Sleeping is easier when the body follows a routine. Don’t let your teen stay up too late at night or sleep in too long in the morning.  · Help your teen wake up, if needed. Go into the bedroom, open the blinds, and get your teen out of bed -- even on weekends or during school vacations.  · Being active during the day will help your child sleep better at night.  · Discourage use of the TV, computer, or video games for at least an hour before your teen goes to bed. (This is good advice for parents, too!)  · Make a rule that cell phones must be turned off at night.  Safety tips  · Set rules for how your teen can spend time  outside of the house. Give your child a nighttime curfew. If your child has a cell phone, check in periodically by calling to ask where he or she is and what he or she is doing.  · Make sure cell phones and portable music players are used safely and responsibly. Help your teen understand that it is dangerous to talk on the phone, text, or listen to music with headphones while he or she is riding a bike or walking outdoors, especially when crossing the street.  · Constant loud music can cause hearing damage, so monitor your teen’s music volume. Many music players let you set a limit for how loud the volume can be turned up. Check the directions for details.  · When your teen is old enough for a ’s license, encourage safe driving. Teach your teen to always wear a seat belt, drive the speed limit, and follow the rules of the road. Do not allow your teenager to text or talk on a cell phone while driving. (And don’t do this yourself! Remember, you set an example.)  · Set rules and limits around driving and use of the car. If your teen gets a ticket or has an accident, there should be consequences. Driving is a privilege that can be taken away if your child doesn’t follow the rules.  · Teach your child to make good decisions about drugs, alcohol, sex, and other risky behaviors. Work together to come up with strategies for staying safe and dealing with peer pressure. Make sure your teenager knows he or she can always come to you for help.  Tests and vaccinations  If you have a strong family history of high cholesterol, your teen’s blood cholesterol may be tested at this visit. Based on recommendations from the CDC, at this visit your child may receive the following vaccinations:  · Meningococcal  · Influenza (flu), annually  Recognizing signs of depression  It’s normal for teenagers to have extreme mood swings as a result of their changing hormones. It’s also just a part of growing up. But sometimes a teenager’s mood  swings are signs of a larger problem. If your teen seems depressed for more than 2 weeks, you should be concerned. Signs of depression include:  · Use of drugs or alcohol  · Problems in school and at home  · Frequent episodes of running away  · Thoughts or talk of death or suicide  · Withdrawal from family and friends  · Sudden changes in eating or sleeping habits  · Sexual promiscuity or unplanned pregnancy  · Hostile behavior or rage  · Loss of pleasure in life  Depressed teens can be helped with treatment. Talk to your child’s health care provider. Or check with your local mental health center, social service agency, or hospital. Assure your teen that his or her pain can be eased. Offer your love and support. If your teen talks about death or suicide, seek help right away.      Next checkup at: _______________________________     PARENT NOTES:        © 2181-0155 [employer]. [address] [place] [address]. All rights reserved. This information is not intended as a substitute for professional medical care. Always follow your healthcare professional's instructions.        Puberty: Normal Growth and Development in Boys  Your child has reached the stage of adolescence called puberty. During this stage, your child’s body begins to develop and gain sexual maturity. This sheet tells you what to expect during this stage of your child’s growth and development.  How long does puberty last?  In boys, puberty usually begins between the ages of 10 and 16. Once it begins, it lasts about 2 to 5 years. But every child is different. And there is a wide range of what is “normal.” Your boy may begin puberty a little earlier or later and finish sooner or later than his friends. If you have questions or concerns about your child’s development, talk to your child’s healthcare provider.  Physical changes during puberty    · Height and weight changes:  ¨ About 20% of total adult height is gained during puberty.  Typically, boys have their height spurt fairly late in puberty.  ¨ About 50% of normal adult weight is gained during puberty. Boys often have a lower percentage of body fat by the end of puberty.  · Voice changes: Boy’s voices get lower and deeper during puberty.  · Sexual changes and hair growth:  ¨ At the start of puberty, the testicles drop lower and the scrotum darkens and becomes looser. Later in puberty, the penis begins to grow and mature.  ¨ Pubic hair begins to grow. At first it may be thin. It then gets darker and coarser. Boys also begin to grow hair in other new places, such as the chest, underarms, face, and legs.  ¨ Erections (stiffening of the penis as it becomes filled with blood) and nocturnal emissions (“wet dreams”) occur. This is most common in the later stages of puberty as the body begins to produce sperm.  · Acne and body odor:  ¨ Hormones that increase during puberty can cause acne on the face and body.  ¨ Hormones also increase sweating and cause a stronger body odor.  Reassuring your child  · Your child may be concerned that his peers are more or less developed than he is. Explain to your child that kids of the same age may be at different stages of puberty. Your child’s growth, whether slow or fast, is happening at the right rate for him.  · Help your child adjust to his changing body. Offer solutions for body odor and acne (such as bathing more often, using deodorant, and using acne products).  · Your child will likely feel uncomfortable discussing sexual changes with you. Let him know you are there to talk to. You may also consider giving your child a book with information about puberty that he can read on his own.  Examinations during puberty  As puberty begins, it’s important for your son to see his health care provider once a year. Continue bringing him in for regular health screenings. Know that, throughout puberty, health screenings will involve examination of your child without  clothes. This lets the health care provider see how your son is progressing physically through puberty. Reassure your child that this exam is normal and expected. Also, parents may be asked to leave the room during a portion of the exam. This is so the child and the health care provider can have an honest and open discussion. If you have any questions or concerns, talk to your child’s health care provider.   © 5307-9544 [employer]. [address] [place] [address], [place] [address]. All rights reserved. This information is not intended as a substitute for professional medical care. Always follow your healthcare professional's instructions.        For Teens: Understanding HPV and Genital Warts  HPV (human papilloma virus) spreads through skin contact. Some types of HPV cause genital warts. Other types put females at higher risk of cancer of the cervix. HPV is very common in both men and women. And it can’t be cured. But there are treatments to remove warts. Tests can also help spot warning signs of cervical cancer.    What to look for  Some types of HPV cause warts. Others don’t. You can also have more than one type of HPV at a time. Here are some things to look out for:  · Painless lumps or bumps. Warts may be bumpy, cauliflower-shaped, or flat. They can appear in or around the genitals or anus.   · An abnormal Pap smear. Over time, HPV can cause abnormal cell changes (dysplasia) on the cervix. If you have an abnormal Pap smear, a follow-up test may be done to look for HPV.  Treatment  Warts can be removed by a doctor. But the virus stays in the body. Both males and females can pass on HPV even when warts aren’t visible. If a female has an abnormal Pap smear, she may have other tests or treatment. Regular checkups can help make sure the cervix is healthy.  If you don’t get treated  HPV can cause cell changes that increase the chance of getting cervical cancer. This health problem can sometimes cause death. If  you are sexually active, you may need to be screened for cervical cancer by having a Pap test and an HPV test. At age 21, it's recommended women have a Pap test. A Pap test can help spot warning signs of cancer early on--when treatments work best. Discuss cervical cancer screening guidelines and tests with your doctor.     There is an HPV vaccination that helps protects both men and women from future infection with the types of HPV that are most likely to lead to cancer. Ask your doctor whether this vaccine is right for you.      © 0477-2601 [employer]. [address] [place] [address]. All rights reserved. This information is not intended as a substitute for professional medical care. Always follow your healthcare professional's instructions.           Patient Instructions History

## 2023-07-25 NOTE — PROGRESS NOTES
[facility]    Medicine Progress Note - Hospitalist Service    Date of Admission:  7/22/2023    Assessment & Plan   Mundo Kern  Is a 48 year old male without any history of prior medical problem presented to the emergency department with right hand weakness, lightheadedness and difficulty finding words.  Reportedly started on July 22, 2023 around 2 PM, while patient reportedly was playing a video game.  After he presented to the emergency department, the symptoms reportedly had resolved.     Final discharge diagnoses and hospital course     Acute ischemic stroke of left MCA territory due to embolic stroke of undetermined source  Hypertension  -  Patient presented with right hand weakness, lightheadedness and word finding difficulty.  She was evaluated in the ED ED, stroke code was called, CT head CT of the head and neck and CT perfusion was done.  CT head no finding or mass hemorrhage or focal area suggestive of acute infarct, CT of the head no discrete vessel occlusion, aneurysm-vascular malformation, CT of the neck showed no significant stenosis.  CT perfusion showed mild delay in the mean transit time to the posterior left frontal lobe.     MRI of the brain was done, shows Small acute left MCA territory infarct with involvement of the postcentral gyrus. There late acute infarcts in the left MCA distribution involving the left middle frontal gyrus and posterior insula. No mass, acute hemorrhage, or   extra-axial fluid collections.    She was loaded with aspirin, and Plavix 3 mg times once.  And then started on aspirin 81 mg daily and Plavix 75 mg daily for 21 days, then switch to aspirin 325 mg daily.  Is started on Lipitor 40 mg daily as well.  Today hypertensive, now the blood pressure is stable, and normal range.  --Long-term goal for blood pressure is SBP< 130 and DBBP<  80  -DAPT x21 days with daily aspirin 81 mg plus Plavix 75 mg followed by aspirin 325 mg daily for secondary  "prevention  -EKG and telemetry while in the hospital remained normal sinus rhythm.  -Echocardiogram was done, shows normal ejection fraction 55 to 60%, significant valvular abnormality, bubble study was negative  -Reviewed laboratory data including TSH, A1c, troponin, alcohol ethyl, aPTT, INR, comprehensive metabolic profile and CBC all negative.  Stroke Neurology is recommending MARIA TERESA to rule out cause of his stroke, will hold the discharge today and will do MARIA TERESA in the morning.      Patient symptoms completely resolved, PT has discharged him from inpatient physical therapy at this time.  He will be discharged home in stable improved condition after the MARIA TERESA tomorrow if normal.  He needs to follow-up with the primary care physician in 1 week and stroke neurology in 6-8 weeks.     Alcohol use  Nicotine dependency     -History of a pack a day cigarette smoking  -Drinks 3-4 times a week in the amount of 4-6 beer each time  -Patient is advised to make lifestyle modification     Smoking cessation counseling given of nicotine patches, but patient declined.  He is motivated to quit smoking.  Counseling given regarding alcohol abuse as well.  He will cut down his alcohol intake.  -He will establish care with primary care doctor.     -Education provided regarding high cholesterol and risk for stroke and heart attack.  -Encouraged him to take medication as prescribed.  -Patient understood and agreed with the plan.            DVT Prophylaxis: Low Risk/Ambulatory with no VTE prophylaxis indicated  Velazquez Catheter: Not present  Lines: None     Cardiac Monitoring: ACTIVE order. Indication: Stroke, acute (48 hours)  Code Status: Full Code      Clinically Significant Risk Factors Present on Admission   Alcohol use unspecified  Tobacco dependency  No physical for the past 10 years                    # Overweight: Estimated body mass index is 25.1 kg/m  as calculated from the following:    Height as of this encounter: 1.803 m (5' 11\").   "  Weight as of this encounter: 81.6 kg (180 lb).            Disposition Plan Awaiting echocardiogram and neurologist clearance.  Most likely tomorrow or later today.     Expected Discharge Date: 07/25/2023      Destination: home (SLP)          The patient's care was discussed with the Attending Physician, Dr. Sinha, Bedside Nurse, Patient and Patient's Family.    Nghia Sinha MD  Hospitalist Service  [facility]  Securely message with [employer] (more info)  Text page via [employer] Paging/Directory   ______________________________________________________________________    Interval History   Offer no active complaints, overall doing well, has no more symptoms.     No other significant event overnight.       Physical Exam   Vital Signs: Temp: 97.5  F (36.4  C) Temp src: Oral BP: (!) 128/93 Pulse: 101   Resp: 16 SpO2: 95 % O2 Device: None (Room air)    Weight: 180 lbs 0 oz    Constitutional: awake, alert, cooperative, no apparent distress, and appears stated age  Eyes: lids and lashes normal, pupils equal, round and reactive to light, extra-ocular muscles intact, sclera clear and conjunctiva normal  ENT: normocepalic, without obvious abnormality  Respiratory: No increased work of breathing, good air exchange, clear to auscultation bilaterally, no crackles or wheezing  Cardiovascular: Normal apical impulse, regular rate and rhythm, normal S1 and S2, no S3 or S4, and no murmur noted  GI: Bowel sound positive nontender nondistended.  Skin: No acute rash noted on exposed skin.  Warm and dry.  Musculoskeletal: No extremity edema noted.  Intact range of motion.  Neurologic: Mental Status Exam:  Level of Alertness:   awake  Orientation:   person, place, time  Cranial Nerves:  VII: Facial strength: intact  Coordination:  Finger/Nose:  Right:  normal  Left:  normal    Medical Decision Making     25 MINUTES SPENT BY ME on the date of service doing chart review, history, exam, documentation & further activities  per the note.      Data     I have personally reviewed the following data over the past 24 hrs:    INR:  N/A PTT:  N/A   D-dimer:  N/A Fibrinogen:  N/A       Imaging results reviewed over the past 24 hrs:   Recent Results (from the past 24 hour(s))   Echocardiogram Complete w Bubble Study - For age < 60 yrs   Result Value    LVEF  55-60%    Narrative    [id number]  [id number]  [id number]  [id number]^PO^RANDY     [facility]  Echocardiography Laboratory  [facility] [place] [address]     Name: MARK MEYER  MRN: [id number]  : 1975  Study Date: 2023 10:32 AM  Age: 48 yrs  Gender: Male  Patient Location: [facility]  Reason For Study: Cerebrovascular Incident  Ordering Physician: RANDY FONTENOT  Performed By: Kev Bianchi RDCS     BSA: 2.0 m2  Height: 71 in  Weight: 180 lb  HR: 76  BP: 135/101 mmHg  ______________________________________________________________________________  Procedure  Complete Portable Bubble Echo Adult.  ______________________________________________________________________________  Interpretation Summary     The visual ejection fraction is 55-60%.  Grossly normal wall motion. A small area of basal to mid inferior wall  hypokinesis cannot be ruled out. Contrast would be useful for better  endocardial border definition.  The right ventricle is normal in size and function.  The inferior vena cava was normal in size with preserved respiratory  variability.  A contrast injection (Bubble Study) was performed that was negative for flow  across the interatrial septum.  ______________________________________________________________________________  Left Ventricle  The left ventricle is normal in structure, function and size. The visual  ejection fraction is 55-60%. The left ventricular ejection fraction is normal.  Diastolic Doppler findings (E/E' ratio and/or other parameters) suggest left  ventricular filling pressures are normal.     Right Ventricle  The  right ventricle is normal in size and function.     Atria  Normal left atrial size. Right atrial size is normal. A contrast injection  (Bubble Study) was performed that was negative for flow across the interatrial  septum.     Mitral Valve  The mitral valve is normal in structure and function.     Tricuspid Valve  The tricuspid valve is normal in structure and function. The right ventricular  systolic pressure is approximated at 16.5 mmHg plus the right atrial pressure.  There is trace tricuspid regurgitation.     Aortic Valve  The aortic valve is normal in structure and function.     Pulmonic Valve  The pulmonic valve is not well visualized.     Vessels  The aortic root is normal size. The inferior vena cava was normal in size with  preserved respiratory variability.     Pericardium  There is no pericardial effusion.     ______________________________________________________________________________  MMode/2D Measurements & Calculations  IVSd: 0.97 cm  LVIDd: 5.0 cm  LVIDs: 2.9 cm  LVPWd: 0.86 cm  FS: 42.7 %     LV mass(C)d: 164.1 grams  LV mass(C)dI: 81.4 grams/m2  Ao root diam: 4.1 cm  LA dimension: 3.4 cm  asc Aorta Diam: 3.5 cm  LA/Ao: 0.84  LVOT diam: 2.7 cm  LVOT area: 5.5 cm2  RWT: 0.34     Doppler Measurements & Calculations  MV E max neeraj: 52.7 cm/sec  MV A max neeraj: 52.7 cm/sec  MV E/A: 1.0  MV dec time: 0.30 sec  LV V1 max P.9 mmHg  LV V1 max: 69.0 cm/sec  LV V1 VTI: 12.9 cm  SV(LVOT): 71.1 ml  SI(LVOT): 35.3 ml/m2  TR max neeraj: 203.0 cm/sec  TR max P.5 mmHg  E/E' av.8  Lateral E/e': 7.3  Medial E/e': 8.4  RV S Neeraj: 12.5 cm/sec     ______________________________________________________________________________  Report approved by: Mariano Santamaria 2023 03:29 PM

## 2023-07-25 NOTE — PROGRESS NOTES
1335 A/O. Pt denies difficulty swallowing or sleep apnea. No dentures or loose teeth. NPO x 12+ hrs. Post procedure plan discussed with pt pre procedure w/ verbal understanding received.  All questions & concerns addressed.       MARIA TERESA: Pt tolerated well. VSS. Total sedation given - 5 mg Versed & 150 mcg Fentanyl. See MARIA TERESA Flowsheet.       1540 Detailed report called to Ivy ARTEAGA. Pt to be NPO until 1600. Both pt & nurse informed.    1545 Pt transferred to 1701-01 per cart.  Pt  A/O. Resp even & unlabored upon transfer.

## 2023-07-25 NOTE — DISCHARGE INSTRUCTIONS
MARIA TERESA  (Transesophageal Echocardiogram)  Discharge Instructions    After you go home:    Have an adult stay with you for 6 hours.       For 24 hours - due to the sedation you received:  Relax and take it easy.  Do NOT make any important or legal decisions.  Do NOT drive or operate machines at home or at work.  Do NOT drink alcohol.    Diet:  You may resume your normal diet, but no scratchy foods for two days.  If your throat is sore, eat cold, bland or soft foods.  You may have heartburn if the tube used in the exam entered your stomach.  If so:   - Do not eat acidic and spicy foods.   - Do not eat three hours before bedtime. Clear liquids are okay.   - When lying down, use two pillows to raise your head.    Medicines:    Take your medications, including blood thinners, unless your provider tells you not to.  If you have stopped any medicines, check with your provider about when to restart them.  You may take Tylenol (Acetaminophen) if your throat is sore.  You may take antacids if you have heartburn.      Follow Up Appointments:    Follow up with your cardiologist at [facility] of patient preference as instructed.  Follow up with your primary care provider as needed.    Call the clinic if:    You have heartburn that is severe or lasts more than 72 hours.  You have a sore throat that feels worse after 72 hours.  You have shortness of breath, neck pain, chest pain, fever, chills, coughing up blood, or other unusual signs.  Questions or concerns      [facility] [place]:    [phone] [facility] (7 days a week)        Your risk factors for stroke or TIA (transient ischemic attack):    Your Risk Factors Your Results Normal Ranges   High blood pressure BP Readings from Last 1 Encounters:   07/25/23 122/86    Less than 120/80   Cholesterol              Total Lab Results   Component Value Date    CHOL 195 07/22/2023      Less than 150    Triglycerides   Lab Results   Component Value Date     TRIG 256 07/22/2023    Less than 150   LDL Lab Results   Component Value Date     07/22/2023       Less than 70   HDL Lab Results   Component Value Date    HDL 41 07/22/2023            Greater than 40 (men)  Greater than 50 (women)   Diabetes Recent Labs   Lab 07/23/23  2359   GLC 99    Fasting blood glucose    Smoking/tobacco use  Quit smoking and tobacco   Overweight  Lose 1-2 pounds a week   Lack of exercise  30 minutes moderate activity each day   Other risk factors include carotid (neck) artery disease, atrial fibrillation and stress. You may be on new medicine to treat high blood pressure, cholesterol, diabetes or atrial fibrillation.    Understanding Stroke Booklet given to patient. Please refer to booklet for further information.    Stroke warning signs and symptoms - CALL 911 right away for:  - Sudden numbness or weakness in the face, arm or leg (often on one side of the body).  - Sudden confusion or trouble understanding what is going on.  - Sudden blurred or decreased vision in one or both eyes.  - Sudden trouble speaking, loss of balance, dizziness or problems with coordination.  - Sudden, severe headache for no reason.  - Fainting or seizures.  - Symptoms may go away then come back suddenly.

## 2023-07-25 NOTE — PRE-PROCEDURE
GENERAL PRE-PROCEDURE:   Procedure:  Transesophageal echocardiogram.  Date/Time:  7/25/2023 2:31 PM    Written consent obtained?: Yes    Risks and benefits: Risks, benefits and alternatives were discussed    Consent given by:  Patient  Patient states understanding of procedure being performed: Yes    Patient's understanding of procedure matches consent: Yes    Procedure consent matches procedure scheduled: Yes    Expected level of sedation:  Moderate  Appropriately NPO:  Yes  ASA Class:  1  Mallampati  :  Grade 1- soft palate, uvula, tonsillar pillars, and posterior pharyngeal wall visible  Lungs:  Lungs clear with good breath sounds bilaterally  Heart:  Normal heart sounds and rate  History & Physical reviewed:  History and physical reviewed and no updates needed  Statement of review:  I have reviewed the lab findings, diagnostic data, medications, and the plan for sedation

## 2023-07-25 NOTE — PLAN OF CARE
Reason for Admission: L MCA CVA  Cognitive/Mentation: A/Ox 4  Neuros/CMS: Intact  VS: VSS on RA   Tele: NSR  GI: BS audible, + flatus, no BM. Continent.  : Voiding adequately. Continent.  Pulmonary: LS clear  Pain: Denies   Drains/Lines: PIV SL  Skin: Intact  Activity: Up independently in the room  Diet: NPO since midnight  Therapies recs: Home with 30-day cardiac monitor  Discharge: Pending completion of workup   Aggression Stoplight Tool: Green  End of shift summary: No changes this shift. Plan for MARIA TERESA today.

## 2023-07-26 ENCOUNTER — PATIENT OUTREACH (OUTPATIENT)
Dept: CARE COORDINATION | Facility: CLINIC | Age: 48
End: 2023-07-26
Payer: COMMERCIAL

## 2023-07-26 NOTE — PROGRESS NOTES
Clinic Care Coordination Contact  [facility]: Post-Discharge Note  SITUATION                                                      Admission:    Admission Date: 07/22/23   Reason for Admission: Cerebrovascular accident (CVA), unspecified mechanism (H)    TIA (transient ischemic attack)  Discharge:   Discharge Date: 07/25/23  Discharge Diagnosis: Cerebrovascular accident (CVA), unspecified mechanism (H)    TIA (transient ischemic attack)    BACKGROUND                                                      Per hospital discharge summary and inpatient provider notes:  Mundo Kern  Is a 48 year old male without any history of prior medical problem presented to the emergency department with right hand weakness, lightheadedness and difficulty finding words.  Reportedly started on July 22, 2023 around 2 PM, while patient reportedly was playing a video game.  After he presented to the emergency department, the symptoms reportedly had resolved.     Final discharge diagnoses and hospital course     Acute ischemic stroke of left MCA territory due to embolic stroke of undetermined source  Hypertension  -  Patient presented with right hand weakness, lightheadedness and word finding difficulty.  She was evaluated in the ED ED, stroke code was called, CT head CT of the head and neck and CT perfusion was done.  CT head no finding or mass hemorrhage or focal area suggestive of acute infarct, CT of the head no discrete vessel occlusion, aneurysm-vascular malformation, CT of the neck showed no significant stenosis.  CT perfusion showed mild delay in the mean transit time to the posterior left frontal lobe.     MRI of the brain was done, shows Small acute left MCA territory infarct with involvement of the postcentral gyrus. There late acute infarcts in the left MCA distribution involving the left middle frontal gyrus and posterior insula. No mass, acute hemorrhage, or   extra-axial fluid collections.    She was loaded with  aspirin, and Plavix 3 mg times once.  And then started on aspirin 81 mg daily and Plavix 75 mg daily for 21 days, then switch to aspirin 325 mg daily.  Is started on Lipitor 40 mg daily as well.  Today hypertensive, now the blood pressure is stable, and normal range.  --Long-term goal for blood pressure is SBP< 130 and DBBP<  80  -DAPT x21 days with daily aspirin 81 mg plus Plavix 75 mg followed by aspirin 325 mg daily for secondary prevention  -EKG and telemetry while in the hospital remained normal sinus rhythm.  -Echocardiogram was done, shows normal ejection fraction 55 to 60%, significant valvular abnormality, bubble study was negative  -Reviewed laboratory data including TSH, A1c, troponin, alcohol ethyl, aPTT, INR, comprehensive metabolic profile and CBC all negative.     Patient symptoms completely resolved, PT has discharged him from inpatient physical therapy at this time.  Stroke Neuro recommend MARIA TERESA which was done and negative for intracardiac mass or thrombus, Normal EF of 60-65%, normal left and right ventricle in size and systolic dysfunction.   He will be discharged home in stable improved condition.  He needs to follow-up with the primary care physician in 1 week and stroke neurology in 6-8 weeks.     Alcohol use  Nicotine dependency     -History of a pack a day cigarette smoking  -Drinks 3-4 times a week in the amount of 4-6 beer each time  -Patient is advised to make lifestyle modification     Smoking cessation counseling given of nicotine patches, but patient declined.  He is motivated to quit smoking.  Counseling given regarding alcohol abuse as well.  He will cut down his alcohol intake.  -He will establish care with primary care doctor.     -Education provided regarding high cholesterol and risk for stroke and heart attack.  -Encouraged him to take medication as prescribed.  -Patient understood and agreed with the plan.        Patient discharged home in stable improved condition.     Nghia DIAS  MD Ernestine, MD           Significant Results and Procedures  Echocardiogram  Interpretation Summary     The visual ejection fraction is 55-60%.  Grossly normal wall motion. A small area of basal to mid inferior wall  hypokinesis cannot be ruled out. Contrast would be useful for better  endocardial border definition.  The right ventricle is normal in size and function.  The inferior vena cava was normal in size with preserved respiratory  variability.  A contrast injection (Bubble Study) was performed that was negative for flow  across the interatrial septum.     MARIA TERESA  Interpretation Summary     Challenging study due to patient requiring very high doses of sedation and  attempting to pull probe out.     1. No intracardiac mass or thrombus.  2. Normal left ventricular size and systolic function. LVEF 60-65%.  3. Normal right ventricular size and systolic function.  4. No significant valve disease.  5. Bubble study was not performed today due to sedation challenges. Had a  negative bubble study on transthoracic study yesterday.    ASSESSMENT           Discharge Assessment  How are you doing now that you are home?: Pt's mother reports Pt is okay.  How are your symptoms? (Red Flag symptoms escalate to triage hotline per guidelines): Improved  Do you feel your condition is stable enough to be safe at home until your provider visit?: Yes  Does the patient have their discharge instructions? : Yes  Does the patient have questions regarding their discharge instructions? : No  Were you started on any new medications or were there changes to any of your previous medications? : Yes  Does the patient have all of their medications?: Yes  Do you have questions regarding any of your medications? : No  Do you have all of your needed medical supplies or equipment (DME)?  (i.e. oxygen tank, CPAP, cane, etc.): Yes  Discharge follow-up appointment scheduled within 14 calendar days? : No (Pt will schedule as needed)      PLAN                                                       Outpatient Plan:      Follow up with primary care provider, Physician No Ref-Primary, within 7 days for hospital follow- up.  No follow up labs or test are needed.       Future Appointments   Date Time Provider Department Center   7/31/2023  3:30 PM Davina Guillen MD [facility], [place]         For any urgent concerns, please contact our 24 hour nurse triage line: [phone] ([phone])         RILEY Victor  Connected Care Resource Center  [facility]     *Connected Care Resource Team does NOT follow patient ongoing. Referrals are identified based on internal discharge reports and the outreach is to ensure patient has an understanding of their discharge instructions.

## 2023-07-27 ENCOUNTER — ANCILLARY ORDERS (OUTPATIENT)
Dept: CARDIOLOGY | Facility: CLINIC | Age: 48
End: 2023-07-27

## 2023-07-27 DIAGNOSIS — I48.91 ATRIAL FIBRILLATION (H): ICD-10-CM

## 2023-07-27 DIAGNOSIS — I63.9 STROKE (H): Primary | ICD-10-CM

## 2023-08-31 ENCOUNTER — TELEPHONE (OUTPATIENT)
Dept: NEUROLOGY | Facility: CLINIC | Age: 48
End: 2023-08-31
Payer: COMMERCIAL

## 2023-08-31 NOTE — TELEPHONE ENCOUNTER
----- Message from Miranda Cotto RN sent at 8/31/2023  8:32 AM CDT -----  Fior can you please communicate this to the patient and explain he does not need any additional cardiac work up at this time. He was also advised to follow up with general neurology after discharge. Please provide him with the scheduling line. Thank you!      Miranda SHAH, RN, SCRN  RN Stroke Neurology Care Coordinator  [facility] Neuroscience Service Line    ----- Message -----  From: Shayy David PA-C  Sent: 8/30/2023   3:16 PM CDT  To: Miranda Cotto RN; Fior Schaeffer    Please let patient know that his heart monitor shows sinus rhythm.

## 2023-09-05 NOTE — TELEPHONE ENCOUNTER
Provided scheduling/call back number to Araceli- verbalized understanding and will provide callback info for patient.    ELIEZER BECKER, CMA

## 2024-12-09 ENCOUNTER — APPOINTMENT (OUTPATIENT)
Dept: MRI IMAGING | Facility: CLINIC | Age: 49
DRG: 065 | End: 2024-12-09
Attending: EMERGENCY MEDICINE
Payer: COMMERCIAL

## 2024-12-09 ENCOUNTER — HOSPITAL ENCOUNTER (INPATIENT)
Facility: CLINIC | Age: 49
DRG: 065 | End: 2024-12-09
Attending: EMERGENCY MEDICINE | Admitting: INTERNAL MEDICINE
Payer: COMMERCIAL

## 2024-12-09 DIAGNOSIS — I63.9 ACUTE ISCHEMIC STROKE (H): ICD-10-CM

## 2024-12-09 DIAGNOSIS — I63.9 CEREBROVASCULAR ACCIDENT (CVA), UNSPECIFIED MECHANISM (H): Primary | ICD-10-CM

## 2024-12-09 LAB
ALBUMIN SERPL BCG-MCNC: 4.4 G/DL (ref 3.5–5.2)
ALP SERPL-CCNC: 88 U/L (ref 40–150)
ALT SERPL W P-5'-P-CCNC: 24 U/L (ref 0–70)
ANION GAP SERPL CALCULATED.3IONS-SCNC: 12 MMOL/L (ref 7–15)
AST SERPL W P-5'-P-CCNC: 32 U/L (ref 0–45)
BASOPHILS # BLD AUTO: 0.1 10E3/UL (ref 0–0.2)
BASOPHILS NFR BLD AUTO: 1 %
BILIRUB SERPL-MCNC: 1 MG/DL
BUN SERPL-MCNC: 10.7 MG/DL (ref 6–20)
CALCIUM SERPL-MCNC: 9.4 MG/DL (ref 8.8–10.4)
CHLORIDE SERPL-SCNC: 103 MMOL/L (ref 98–107)
CREAT SERPL-MCNC: 0.97 MG/DL (ref 0.67–1.17)
EGFRCR SERPLBLD CKD-EPI 2021: >90 ML/MIN/1.73M2
EOSINOPHIL # BLD AUTO: 0.1 10E3/UL (ref 0–0.7)
EOSINOPHIL NFR BLD AUTO: 1 %
ERYTHROCYTE [DISTWIDTH] IN BLOOD BY AUTOMATED COUNT: 12.7 % (ref 10–15)
GLUCOSE SERPL-MCNC: 95 MG/DL (ref 70–99)
HCO3 SERPL-SCNC: 21 MMOL/L (ref 22–29)
HCT VFR BLD AUTO: 49.9 % (ref 40–53)
HGB BLD-MCNC: 17.6 G/DL (ref 13.3–17.7)
HOLD SPECIMEN: NORMAL
IMM GRANULOCYTES # BLD: 0.1 10E3/UL
IMM GRANULOCYTES NFR BLD: 0 %
LYMPHOCYTES # BLD AUTO: 3 10E3/UL (ref 0.8–5.3)
LYMPHOCYTES NFR BLD AUTO: 23 %
MCH RBC QN AUTO: 30.6 PG (ref 26.5–33)
MCHC RBC AUTO-ENTMCNC: 35.3 G/DL (ref 31.5–36.5)
MCV RBC AUTO: 87 FL (ref 78–100)
MONOCYTES # BLD AUTO: 1.2 10E3/UL (ref 0–1.3)
MONOCYTES NFR BLD AUTO: 9 %
NEUTROPHILS # BLD AUTO: 8.8 10E3/UL (ref 1.6–8.3)
NEUTROPHILS NFR BLD AUTO: 67 %
NRBC # BLD AUTO: 0 10E3/UL
NRBC BLD AUTO-RTO: 0 /100
PLATELET # BLD AUTO: 323 10E3/UL (ref 150–450)
POTASSIUM SERPL-SCNC: 3.9 MMOL/L (ref 3.4–5.3)
PROT SERPL-MCNC: 7 G/DL (ref 6.4–8.3)
RBC # BLD AUTO: 5.75 10E6/UL (ref 4.4–5.9)
SODIUM SERPL-SCNC: 136 MMOL/L (ref 135–145)
WBC # BLD AUTO: 13.1 10E3/UL (ref 4–11)

## 2024-12-09 PROCEDURE — 80053 COMPREHEN METABOLIC PANEL: CPT | Performed by: EMERGENCY MEDICINE

## 2024-12-09 PROCEDURE — 250N000013 HC RX MED GY IP 250 OP 250 PS 637: Performed by: EMERGENCY MEDICINE

## 2024-12-09 PROCEDURE — 84484 ASSAY OF TROPONIN QUANT: CPT | Performed by: INTERNAL MEDICINE

## 2024-12-09 PROCEDURE — 70553 MRI BRAIN STEM W/O & W/DYE: CPT

## 2024-12-09 PROCEDURE — 258N000003 HC RX IP 258 OP 636: Performed by: EMERGENCY MEDICINE

## 2024-12-09 PROCEDURE — 85025 COMPLETE CBC W/AUTO DIFF WBC: CPT | Performed by: EMERGENCY MEDICINE

## 2024-12-09 PROCEDURE — 255N000002 HC RX 255 OP 636: Performed by: EMERGENCY MEDICINE

## 2024-12-09 PROCEDURE — 80061 LIPID PANEL: CPT | Performed by: INTERNAL MEDICINE

## 2024-12-09 PROCEDURE — 70544 MR ANGIOGRAPHY HEAD W/O DYE: CPT

## 2024-12-09 PROCEDURE — 85014 HEMATOCRIT: CPT | Performed by: EMERGENCY MEDICINE

## 2024-12-09 PROCEDURE — A9585 GADOBUTROL INJECTION: HCPCS | Performed by: EMERGENCY MEDICINE

## 2024-12-09 PROCEDURE — 99222 1ST HOSP IP/OBS MODERATE 55: CPT | Performed by: INTERNAL MEDICINE

## 2024-12-09 PROCEDURE — 99446 NTRPROF PH1/NTRNET/EHR 5-10: CPT | Performed by: STUDENT IN AN ORGANIZED HEALTH CARE EDUCATION/TRAINING PROGRAM

## 2024-12-09 PROCEDURE — 83036 HEMOGLOBIN GLYCOSYLATED A1C: CPT | Performed by: INTERNAL MEDICINE

## 2024-12-09 PROCEDURE — 70549 MR ANGIOGRAPH NECK W/O&W/DYE: CPT

## 2024-12-09 PROCEDURE — 99285 EMERGENCY DEPT VISIT HI MDM: CPT | Mod: 25

## 2024-12-09 PROCEDURE — 36415 COLL VENOUS BLD VENIPUNCTURE: CPT | Performed by: EMERGENCY MEDICINE

## 2024-12-09 PROCEDURE — 120N000001 HC R&B MED SURG/OB

## 2024-12-09 RX ORDER — ATORVASTATIN CALCIUM 40 MG/1
40 TABLET, FILM COATED ORAL EVERY EVENING
Status: DISCONTINUED | OUTPATIENT
Start: 2024-12-09 | End: 2024-12-14 | Stop reason: HOSPADM

## 2024-12-09 RX ORDER — ACETAMINOPHEN 325 MG/1
650 TABLET ORAL EVERY 4 HOURS PRN
Status: DISCONTINUED | OUTPATIENT
Start: 2024-12-09 | End: 2024-12-14 | Stop reason: HOSPADM

## 2024-12-09 RX ORDER — ASPIRIN 325 MG
325 TABLET, DELAYED RELEASE (ENTERIC COATED) ORAL DAILY
Status: DISCONTINUED | OUTPATIENT
Start: 2024-12-09 | End: 2024-12-14 | Stop reason: HOSPADM

## 2024-12-09 RX ORDER — MECLIZINE HYDROCHLORIDE 25 MG/1
25 TABLET ORAL ONCE
Status: COMPLETED | OUTPATIENT
Start: 2024-12-09 | End: 2024-12-09

## 2024-12-09 RX ORDER — ASPIRIN 300 MG/1
300 SUPPOSITORY RECTAL DAILY
Status: DISCONTINUED | OUTPATIENT
Start: 2024-12-09 | End: 2024-12-14 | Stop reason: HOSPADM

## 2024-12-09 RX ORDER — ACETAMINOPHEN 650 MG/1
650 SUPPOSITORY RECTAL EVERY 4 HOURS PRN
Status: DISCONTINUED | OUTPATIENT
Start: 2024-12-09 | End: 2024-12-14 | Stop reason: HOSPADM

## 2024-12-09 RX ORDER — ONDANSETRON 4 MG/1
4 TABLET, ORALLY DISINTEGRATING ORAL EVERY 6 HOURS PRN
Status: DISCONTINUED | OUTPATIENT
Start: 2024-12-09 | End: 2024-12-14 | Stop reason: HOSPADM

## 2024-12-09 RX ORDER — ACETAMINOPHEN 325 MG/10.15ML
650 LIQUID ORAL EVERY 4 HOURS PRN
Status: DISCONTINUED | OUTPATIENT
Start: 2024-12-09 | End: 2024-12-14 | Stop reason: HOSPADM

## 2024-12-09 RX ORDER — ONDANSETRON 2 MG/ML
4 INJECTION INTRAMUSCULAR; INTRAVENOUS EVERY 30 MIN PRN
Status: DISCONTINUED | OUTPATIENT
Start: 2024-12-09 | End: 2024-12-09

## 2024-12-09 RX ORDER — GADOBUTROL 604.72 MG/ML
10 INJECTION INTRAVENOUS ONCE
Status: COMPLETED | OUTPATIENT
Start: 2024-12-09 | End: 2024-12-09

## 2024-12-09 RX ORDER — ASPIRIN 81 MG/1
324 TABLET, CHEWABLE ORAL DAILY
Status: DISCONTINUED | OUTPATIENT
Start: 2024-12-09 | End: 2024-12-14 | Stop reason: HOSPADM

## 2024-12-09 RX ORDER — ONDANSETRON 2 MG/ML
4 INJECTION INTRAMUSCULAR; INTRAVENOUS EVERY 6 HOURS PRN
Status: DISCONTINUED | OUTPATIENT
Start: 2024-12-09 | End: 2024-12-14 | Stop reason: HOSPADM

## 2024-12-09 RX ADMIN — GADOBUTROL 10 ML: 604.72 INJECTION INTRAVENOUS at 22:16

## 2024-12-09 RX ADMIN — SODIUM CHLORIDE 1000 ML: 9 INJECTION, SOLUTION INTRAVENOUS at 20:57

## 2024-12-09 RX ADMIN — MECLIZINE HYDROCHLORIDE 25 MG: 25 TABLET ORAL at 20:50

## 2024-12-09 ASSESSMENT — ACTIVITIES OF DAILY LIVING (ADL)
ADLS_ACUITY_SCORE: 44

## 2024-12-09 ASSESSMENT — COLUMBIA-SUICIDE SEVERITY RATING SCALE - C-SSRS
1. IN THE PAST MONTH, HAVE YOU WISHED YOU WERE DEAD OR WISHED YOU COULD GO TO SLEEP AND NOT WAKE UP?: NO
6. HAVE YOU EVER DONE ANYTHING, STARTED TO DO ANYTHING, OR PREPARED TO DO ANYTHING TO END YOUR LIFE?: NO
2. HAVE YOU ACTUALLY HAD ANY THOUGHTS OF KILLING YOURSELF IN THE PAST MONTH?: NO

## 2024-12-09 NOTE — ED TRIAGE NOTES
Pt arrives via triage presenting with multiple complaints. Per pt, he has been throwing up since Friday and is now concerned for dehydration. Pt says he feels weak and hasn't had the strength to walk since Friday.      Triage Assessment (Adult)       Row Name 12/09/24 1538          Triage Assessment    Airway WDL WDL        Respiratory WDL    Respiratory WDL WDL        Skin Circulation/Temperature WDL    Skin Circulation/Temperature WDL WDL        Cardiac WDL    Cardiac WDL WDL        Peripheral/Neurovascular WDL    Peripheral Neurovascular WDL WDL        Cognitive/Neuro/Behavioral WDL    Cognitive/Neuro/Behavioral WDL WDL

## 2024-12-10 ENCOUNTER — APPOINTMENT (OUTPATIENT)
Dept: CT IMAGING | Facility: CLINIC | Age: 49
DRG: 065 | End: 2024-12-10
Attending: PSYCHIATRY & NEUROLOGY
Payer: COMMERCIAL

## 2024-12-10 ENCOUNTER — APPOINTMENT (OUTPATIENT)
Dept: SPEECH THERAPY | Facility: CLINIC | Age: 49
DRG: 065 | End: 2024-12-10
Attending: INTERNAL MEDICINE
Payer: COMMERCIAL

## 2024-12-10 ENCOUNTER — APPOINTMENT (OUTPATIENT)
Dept: PHYSICAL THERAPY | Facility: CLINIC | Age: 49
DRG: 065 | End: 2024-12-10
Attending: INTERNAL MEDICINE
Payer: COMMERCIAL

## 2024-12-10 LAB
ANION GAP SERPL CALCULATED.3IONS-SCNC: 12 MMOL/L (ref 7–15)
ATRIAL RATE - MUSE: 84 BPM
BUN SERPL-MCNC: 11 MG/DL (ref 6–20)
CALCIUM SERPL-MCNC: 8.9 MG/DL (ref 8.8–10.4)
CHLORIDE SERPL-SCNC: 104 MMOL/L (ref 98–107)
CHOLEST SERPL-MCNC: 217 MG/DL
CREAT SERPL-MCNC: 1.02 MG/DL (ref 0.67–1.17)
DIASTOLIC BLOOD PRESSURE - MUSE: NORMAL MMHG
EGFRCR SERPLBLD CKD-EPI 2021: >90 ML/MIN/1.73M2
ERYTHROCYTE [DISTWIDTH] IN BLOOD BY AUTOMATED COUNT: 12.6 % (ref 10–15)
EST. AVERAGE GLUCOSE BLD GHB EST-MCNC: 105 MG/DL
GLUCOSE BLDC GLUCOMTR-MCNC: 97 MG/DL (ref 70–99)
GLUCOSE SERPL-MCNC: 156 MG/DL (ref 70–99)
HBA1C MFR BLD: 5.3 %
HCO3 SERPL-SCNC: 21 MMOL/L (ref 22–29)
HCT VFR BLD AUTO: 46 % (ref 40–53)
HDLC SERPL-MCNC: 40 MG/DL
HGB BLD-MCNC: 16.6 G/DL (ref 13.3–17.7)
INTERPRETATION ECG - MUSE: NORMAL
LDLC SERPL CALC-MCNC: 145 MG/DL
MCH RBC QN AUTO: 31.7 PG (ref 26.5–33)
MCHC RBC AUTO-ENTMCNC: 36.1 G/DL (ref 31.5–36.5)
MCV RBC AUTO: 88 FL (ref 78–100)
NONHDLC SERPL-MCNC: 177 MG/DL
P AXIS - MUSE: 49 DEGREES
PLATELET # BLD AUTO: 288 10E3/UL (ref 150–450)
POTASSIUM SERPL-SCNC: 3.4 MMOL/L (ref 3.4–5.3)
PR INTERVAL - MUSE: 176 MS
QRS DURATION - MUSE: 118 MS
QT - MUSE: 370 MS
QTC - MUSE: 437 MS
R AXIS - MUSE: -75 DEGREES
RBC # BLD AUTO: 5.23 10E6/UL (ref 4.4–5.9)
SODIUM SERPL-SCNC: 137 MMOL/L (ref 135–145)
SYSTOLIC BLOOD PRESSURE - MUSE: NORMAL MMHG
T AXIS - MUSE: 69 DEGREES
TRIGL SERPL-MCNC: 162 MG/DL
TROPONIN T SERPL HS-MCNC: <6 NG/L
VENTRICULAR RATE- MUSE: 84 BPM
WBC # BLD AUTO: 15.3 10E3/UL (ref 4–11)

## 2024-12-10 PROCEDURE — 80048 BASIC METABOLIC PNL TOTAL CA: CPT | Performed by: INTERNAL MEDICINE

## 2024-12-10 PROCEDURE — 97161 PT EVAL LOW COMPLEX 20 MIN: CPT | Mod: GP | Performed by: PHYSICAL THERAPIST

## 2024-12-10 PROCEDURE — 85014 HEMATOCRIT: CPT | Performed by: INTERNAL MEDICINE

## 2024-12-10 PROCEDURE — 82435 ASSAY OF BLOOD CHLORIDE: CPT | Performed by: INTERNAL MEDICINE

## 2024-12-10 PROCEDURE — 97116 GAIT TRAINING THERAPY: CPT | Mod: GP | Performed by: PHYSICAL THERAPIST

## 2024-12-10 PROCEDURE — 250N000013 HC RX MED GY IP 250 OP 250 PS 637: Performed by: INTERNAL MEDICINE

## 2024-12-10 PROCEDURE — 97112 NEUROMUSCULAR REEDUCATION: CPT | Mod: GP | Performed by: PHYSICAL THERAPIST

## 2024-12-10 PROCEDURE — 250N000011 HC RX IP 250 OP 636: Performed by: PSYCHIATRY & NEUROLOGY

## 2024-12-10 PROCEDURE — 74177 CT ABD & PELVIS W/CONTRAST: CPT

## 2024-12-10 PROCEDURE — 92610 EVALUATE SWALLOWING FUNCTION: CPT | Mod: GN

## 2024-12-10 PROCEDURE — 99254 IP/OBS CNSLTJ NEW/EST MOD 60: CPT | Mod: GC | Performed by: PSYCHIATRY & NEUROLOGY

## 2024-12-10 PROCEDURE — 36415 COLL VENOUS BLD VENIPUNCTURE: CPT | Performed by: INTERNAL MEDICINE

## 2024-12-10 PROCEDURE — 250N000009 HC RX 250: Performed by: PSYCHIATRY & NEUROLOGY

## 2024-12-10 PROCEDURE — 82962 GLUCOSE BLOOD TEST: CPT

## 2024-12-10 PROCEDURE — 99232 SBSQ HOSP IP/OBS MODERATE 35: CPT | Performed by: INTERNAL MEDICINE

## 2024-12-10 PROCEDURE — 97530 THERAPEUTIC ACTIVITIES: CPT | Mod: GP | Performed by: PHYSICAL THERAPIST

## 2024-12-10 PROCEDURE — 120N000001 HC R&B MED SURG/OB

## 2024-12-10 RX ORDER — LIDOCAINE 40 MG/G
CREAM TOPICAL
Status: DISCONTINUED | OUTPATIENT
Start: 2024-12-10 | End: 2024-12-11

## 2024-12-10 RX ORDER — AMOXICILLIN 250 MG
1-2 CAPSULE ORAL 2 TIMES DAILY
Status: DISCONTINUED | OUTPATIENT
Start: 2024-12-10 | End: 2024-12-14 | Stop reason: HOSPADM

## 2024-12-10 RX ORDER — IOPAMIDOL 755 MG/ML
91 INJECTION, SOLUTION INTRAVASCULAR ONCE
Status: COMPLETED | OUTPATIENT
Start: 2024-12-10 | End: 2024-12-10

## 2024-12-10 RX ADMIN — SODIUM CHLORIDE 68 ML: 9 INJECTION, SOLUTION INTRAVENOUS at 12:26

## 2024-12-10 RX ADMIN — IOPAMIDOL 91 ML: 755 INJECTION, SOLUTION INTRAVENOUS at 12:25

## 2024-12-10 RX ADMIN — ASPIRIN 81 MG CHEWABLE TABLET 324 MG: 81 TABLET CHEWABLE at 00:10

## 2024-12-10 RX ADMIN — ASPIRIN 81 MG CHEWABLE TABLET 324 MG: 81 TABLET CHEWABLE at 08:18

## 2024-12-10 RX ADMIN — ATORVASTATIN CALCIUM 40 MG: 40 TABLET, FILM COATED ORAL at 19:39

## 2024-12-10 RX ADMIN — ATORVASTATIN CALCIUM 40 MG: 40 TABLET, FILM COATED ORAL at 04:49

## 2024-12-10 RX ADMIN — SENNOSIDES AND DOCUSATE SODIUM 1 TABLET: 50; 8.6 TABLET ORAL at 19:39

## 2024-12-10 ASSESSMENT — ACTIVITIES OF DAILY LIVING (ADL)
ADLS_ACUITY_SCORE: 44
ADLS_ACUITY_SCORE: 24
ADLS_ACUITY_SCORE: 44
ADLS_ACUITY_SCORE: 24
ADLS_ACUITY_SCORE: 24
ADLS_ACUITY_SCORE: 44
ADLS_ACUITY_SCORE: 24
ADLS_ACUITY_SCORE: 21
ADLS_ACUITY_SCORE: 44
DEPENDENT_IADLS:: INDEPENDENT
ADLS_ACUITY_SCORE: 44
ADLS_ACUITY_SCORE: 44
ADLS_ACUITY_SCORE: 22
ADLS_ACUITY_SCORE: 22
ADLS_ACUITY_SCORE: 24
ADLS_ACUITY_SCORE: 44
ADLS_ACUITY_SCORE: 21
ADLS_ACUITY_SCORE: 24
ADLS_ACUITY_SCORE: 22
ADLS_ACUITY_SCORE: 22
ADLS_ACUITY_SCORE: 44
ADLS_ACUITY_SCORE: 44

## 2024-12-10 NOTE — ED PROVIDER NOTES
"  Emergency Department Note      History of Present Illness     Chief Complaint   Dizziness (/) and Vomiting      HPI   Mundo Kern is a 49 year old male with reported history of stroke who presents to the ER for 2 to 3 days of loss of equilibrium in the setting of nausea, vomiting, diarrhea at onset of symptoms.  He denies headache.  No vision changes.  No new numbness or weakness in extremities.  He does feel that he has some coordination concerns which are new.  He does not have history of equilibrium problems.        Past Medical History     Medical History and Problem List   Past Medical History:   Diagnosis Date    Cerebrovascular accident (H)     Tobacco use disorder        Medications   aspirin (ASA) 325 MG EC tablet  atorvastatin (LIPITOR) 40 MG tablet  clopidogrel (PLAVIX) 75 MG tablet        Surgical History   No past surgical history on file.    Physical Exam     Patient Vitals for the past 24 hrs:   BP Temp Temp src Pulse Resp SpO2 Height Weight   12/09/24 2321 -- -- -- 75 17 -- -- --   12/09/24 2314 -- -- -- 67 14 -- -- --   12/09/24 2302 (!) 133/101 -- -- 71 -- -- -- --   12/09/24 1539 (!) 147/94 98.4  F (36.9  C) Oral 83 18 97 % 1.803 m (5' 11\") 81.6 kg (180 lb)     Physical Exam  VS: Reviewed per above  HENT: Mucous membranes moist, no nuchal rigidity  EYES: sclera anicteric  CV: Rate as noted, regular rhythm.   RESP: Effort normal. Breath sounds are normal bilaterally.  GI: no tenderness/rebound/guarding, not distended.  NEURO: GCS 15, cranial nerves II through XII are intact, 5 out of 5 strength in all 4 extremities, sensation is intact light touch in all 4 extremities. Dysmetria in the RUE with FNF testing.   MSK: No deformity of the extremities  SKIN: Warm and dry    Diagnostics     Lab Results   Labs Ordered and Resulted from Time of ED Arrival to Time of ED Departure   COMPREHENSIVE METABOLIC PANEL - Abnormal       Result Value    Sodium 136      Potassium 3.9      Carbon Dioxide (CO2) 21 " (*)     Anion Gap 12      Urea Nitrogen 10.7      Creatinine 0.97      GFR Estimate >90      Calcium 9.4      Chloride 103      Glucose 95      Alkaline Phosphatase 88      AST 32      ALT 24      Protein Total 7.0      Albumin 4.4      Bilirubin Total 1.0     CBC WITH PLATELETS AND DIFFERENTIAL - Abnormal    WBC Count 13.1 (*)     RBC Count 5.75      Hemoglobin 17.6      Hematocrit 49.9      MCV 87      MCH 30.6      MCHC 35.3      RDW 12.7      Platelet Count 323      % Neutrophils 67      % Lymphocytes 23      % Monocytes 9      % Eosinophils 1      % Basophils 1      % Immature Granulocytes 0      NRBCs per 100 WBC 0      Absolute Neutrophils 8.8 (*)     Absolute Lymphocytes 3.0      Absolute Monocytes 1.2      Absolute Eosinophils 0.1      Absolute Basophils 0.1      Absolute Immature Granulocytes 0.1      Absolute NRBCs 0.0         Imaging   MR Brain w/o & w Contrast   Final Result   IMPRESSION:   HEAD MRI:    1.  Large acute versus subacute right cerebellar hemisphere infarct with mild associated edema. No significant posterior fossa mass effect. No hemorrhagic transformation.    2.  Remote right PCA territory infarct and likely small right frontal operculum infarct.      HEAD MRA:    1.  No large vessel occlusion or hemodynamically significant stenosis.   2.  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       NECK MRA:   1.  Normal neck MRA.      Findings discussed with Dr. Agarwal 12/9/2024 10:40 PM CST      MRA Angiogram Head w/o Contrast   Final Result   IMPRESSION:   HEAD MRI:    1.  Large acute versus subacute right cerebellar hemisphere infarct with mild associated edema. No significant posterior fossa mass effect. No hemorrhagic transformation.    2.  Remote right PCA territory infarct and likely small right frontal operculum infarct.      HEAD MRA:    1.  No large vessel occlusion or hemodynamically significant stenosis.   2.  Likely occlusion of  the right superior cerebellar artery and the right AICA is not visualized.       NECK MRA:   1.  Normal neck MRA.      Findings discussed with Dr. Agarwal 12/9/2024 10:40 PM CST      MRA Angiogram Neck w/o & w Contrast   Final Result   IMPRESSION:   HEAD MRI:    1.  Large acute versus subacute right cerebellar hemisphere infarct with mild associated edema. No significant posterior fossa mass effect. No hemorrhagic transformation.    2.  Remote right PCA territory infarct and likely small right frontal operculum infarct.      HEAD MRA:    1.  No large vessel occlusion or hemodynamically significant stenosis.   2.  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       NECK MRA:   1.  Normal neck MRA.      Findings discussed with Dr. Agarwal 12/9/2024 10:40 PM CST          ED Course      Medications Administered   Medications   ondansetron (ZOFRAN) injection 4 mg (has no administration in time range)   sodium chloride 0.9% BOLUS 1,000 mL (1,000 mLs Intravenous $New Bag 12/9/24 2057)   meclizine (ANTIVERT) tablet 25 mg (25 mg Oral $Given 12/9/24 2050)   gadobutrol (GADAVIST) injection 10 mL (10 mLs Intravenous $Given 12/9/24 2216)   sodium chloride (PF) 0.9% PF flush 100 mL (100 mLs Intravenous $Given 12/9/24 2216)     Procedures   Procedures     ED Course   ED Course as of 12/09/24 2344   Mon Dec 09, 2024   4160 I spoke with Dr Morse, stroke team     Medical Decision Making / Diagnosis     [facility]   Mundo Kern is a 49 year old male who presents to the ER with concern for 2 to 3 days of disequilibrium and nausea and vomiting and diarrhea.  Vital signs reassuring.  On exam he does have dysmetria with right upper extremity finger-nose-finger testing.  He has a history of stroke and has not been taking any of his medications are prescribed to him.  Unfortunately, MRI today does reveal large right cerebellar stroke.  Discussed with stroke neurology with plans to continue  aspirin.  Patient tells me that he took possibly two 500 mg aspirin tablets today.  No further aspirin provided here in the ER.  He was admitted to the hospitalist team for further cares.    Disposition   The patient was admitted to the hospital- Dr David.     Diagnosis     ICD-10-CM    1. Acute ischemic stroke (H)  I63.9          Discharge Medications   New Prescriptions    No medications on file          Alhaji Agarwal MD  12/09/24 9450

## 2024-12-10 NOTE — PHARMACY-ADMISSION MEDICATION HISTORY
Pharmacist Admission Medication History    Admission medication history is complete. The information provided in this note is only as accurate as the sources available at the time of the update.    Information Source(s): Patient and CareEverywhere/SureScripts via in-person    Pertinent Information:   - Patient reports no medications, Rx or OTC    Changes made to PTA medication list:  Added: None  Deleted:   ASA, Atorvastatin, Clopidogrel  Changed: None    Allergies reviewed with patient and updates made in EHR: yes    Medication History Completed By: Joan Ruffin RPH 12/10/2024 7:48 AM    No outpatient medications have been marked as taking for the 12/9/24 encounter (Hospital Encounter).

## 2024-12-10 NOTE — H&P
"[facility]    History and Physical - Hospitalist Service       Date of Admission:  12/9/2024    Assessment & Plan      Mundo Kern is a 49 year old male admitted on 12/9/2024. He presents with loss of equilibrium, n/v, dairrhea    Cerebellar CVA  Hx CVA ESUS  *admit 7/22-24 w/ MCA stroke, thought ESUS. DAPT x 21 days then  mg daily for secondary prevention  *presents with loss of equilibrium with n/v x 2-3 days. No extremity weakness. Some coordination concerns. No vision changes. In ED AFVSS. WBC 13.1.   *MRI w/ large acute vs subacute R cerebellar CVA w/ mild edema, no hemorrhagic transformation. L  *MRA w/ likely occlusion of R superior cerebellar artery and R AICA is not visualized  - telemetry  - q4 neuro checks  - SBP<220  - neuro stroke consult  - echo  - A1c, lipids, troponins  - PT/OT/SLP  -  mg daily  - atorvastatin 40 mg daily  - tobacco cessation     Tobacco use disorder  Smokes 1ppd  - declines NRT  - encouraged cessation    Alcohol use  Drinks 6 pack of beer ~4x/ week  - CIWA        Diet:  Regular  DVT Prophylaxis: ambulate every shift  Velazquez Catheter: Not present  Lines: None     Cardiac Monitoring: None  Code Status:  Full    Clinically Significant Risk Factors Present on Admission                 # Drug Induced Platelet Defect: home medication list includes an antiplatelet medication             # Overweight: Estimated body mass index is 25.1 kg/m  as calculated from the following:    Height as of this encounter: 1.803 m (5' 11\").    Weight as of this encounter: 81.6 kg (180 lb).              Disposition Plan     Medically Ready for Discharge: Anticipated in 2-4 Days           Luis David MD  Hospitalist Service  [facility]  Securely message with [employer] (more info)  Text page via [employer] Paging/Directory     ______________________________________________________________________    Chief Complaint   CVA    History is obtained " from the patient, electronic health record, and emergency department physician    History of Present Illness   Mundo Kern is a 49 year old male who presents with balance issues, loss of equilibrium and nausea and vomiting.  Patient has a history of a stroke in the life of 2023.  It involves the left MCA territory and was secondary to an embolic stroke of undetermined source.  He states that he has continued to take aspirin but not take any other meds.  He notes that on Friday, 3 days prior to presentation, he developed this loss of balance and coordination issues.  He states he had a near loss of consciousness when he was in the bathroom and was having nausea and vomiting.  He states he got in bed over the weekend hoping things will get better but they did not prompting his presentation to the emergency department.  Here he is doing okay.  He denies any vision changes.  Denies any problems swallowing.  He has no weakness in his arms or legs.  He states it can be difficult to walk with his balance issues.  He has no chest pain or shortness of breath.  No nausea or abdominal pain currently.  He states he does drink about a sixpack of beer 4 times a week.  He also continues to smoke a pack per day.      Past Medical History    Past Medical History:   Diagnosis Date    Cerebrovascular accident (H)     Tobacco use disorder        Past Surgical History   No past surgical history on file.    Prior to Admission Medications   Prior to Admission Medications   Prescriptions Last Dose Informant Patient Reported? Taking?   aspirin (ASA) 325 MG EC tablet   No No   Sig: Take 1 tablet (325 mg) by mouth daily   atorvastatin (LIPITOR) 40 MG tablet   No No   Sig: Take 1 tablet (40 mg) by mouth every evening   clopidogrel (PLAVIX) 75 MG tablet   No No   Sig: Take 1 tablet (75 mg) by mouth daily for 19 days      Facility-Administered Medications: None        Review of Systems    The 10 point Review of Systems is negative other than  noted in the HPI or here.     Social History   I have reviewed this patient's social history and updated it with pertinent information if needed.  Social History     Tobacco Use    Smoking status: Every Day     Current packs/day: 1.00     Types: Cigarettes   Substance Use Topics    Alcohol use: Yes     Comment: 6 pack of beer 3-4x per week        Physical Exam   Vital Signs: Temp: 98.4  F (36.9  C) Temp src: Oral BP: (!) 133/101 Pulse: 75   Resp: 17 SpO2: 97 % O2 Device: None (Room air)    Weight: 180 lbs 0 oz    General Appearance: Alert, very pleasant, no distress  Respiratory: CTA B  Cardiovascular: RRR, no murmur. No edema  GI: soft, nt/nd  Skin: no rashes or lesions grossly    Other: CN grossly intact, COLLINS      Medical Decision Making       60 MINUTES SPENT BY ME on the date of service doing chart review, history, exam, documentation & further activities per the note.      Data   ------------------------- PAST 24 HR DATA REVIEWED -----------------------------------------------    I have personally reviewed the following data over the past 24 hrs:    13.1 (H)  \   17.6   / 323     136 103 10.7 /  95   3.9 21 (L) 0.97 \     ALT: 24 AST: 32 AP: 88 TBILI: 1.0   ALB: 4.4 TOT PROTEIN: 7.0 LIPASE: N/A       Imaging results reviewed over the past 24 hrs:   Recent Results (from the past 24 hours)   MR Brain w/o & w Contrast    Narrative    EXAM: MR BRAIN W/O and W CONTRAST, MRA NECK (CAROTIDS) W/O and W CONTRAST, MRA BRAIN ([place]) W/O CONTRAST  LOCATION: [facility]  DATE/TIME: 12/9/2024 10:20 PM CST    INDICATION: dizziness, dysmetria RUE, h o stroke  COMPARISON: None.  CONTRAST: 10mL Gadavist   TECHNIQUE:   1) Routine multiplanar multisequence head MRI with and without intravenous contrast.  2) 3D time-of-flight head MRA without intravenous contrast.  3) Neck MRA without and with IV contrast. Stenosis measurements made according to NASCET criteria unless otherwise  specified.    FINDINGS:  HEAD MRI:  INTRACRANIAL CONTENTS: Large acute versus subacute right paramedian cerebellar hemisphere infarct with mild associated mass effect. No hemorrhagic transformation. Right PCA territory encephalomalacia with cortical laminar necrosis from prior infarct.   Additional small region of encephalomalacia in the right frontal operculum, also likely secondary to remote infarct. No mass, acute hemorrhage, or extra-axial fluid collections. Normal brain parenchymal signal. Normal ventricles and sulci. Normal   Position of the cerebellar tonsils. No pathologic contrast enhancement.    SELLA: No abnormality accounting for technique.    OSSEOUS STRUCTURES/SOFT TISSUES: Normal marrow signal.     ORBITS: No abnormality accounting for technique.     SINUSES/MASTOIDS: No paranasal sinus mucosal disease. No middle ear or mastoid effusion.     HEAD MRA:   ANTERIOR CIRCULATION: No stenosis/occlusion, aneurysm, or high flow vascular malformation. Standard [place] anatomy.    POSTERIOR CIRCULATION: No stenosis/occlusion of the major arteries, aneurysm, or high flow vascular malformation. The right superior cerebellar artery is likely occluded and the right AICA is not visualized. Balanced vertebral arteries supply a normal   basilar artery.     NECK MRA:   RIGHT CAROTID: No measurable stenosis or dissection.    LEFT CAROTID: No measurable stenosis or dissection.    VERTEBRAL ARTERIES: No focal stenosis or dissection. Balanced vertebral arteries.    AORTIC ARCH: Classic aortic arch anatomy with no significant stenosis at the origin of the great vessels.      Impression    IMPRESSION:  HEAD MRI:   1.  Large acute versus subacute right cerebellar hemisphere infarct with mild associated edema. No significant posterior fossa mass effect. No hemorrhagic transformation.   2.  Remote right PCA territory infarct and likely small right frontal operculum infarct.    HEAD MRA:   1.  No large vessel occlusion  or hemodynamically significant stenosis.  2.  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     NECK MRA:  1.  Normal neck MRA.    Findings discussed with Dr. Agarwal 12/9/2024 10:40 PM CST   MRA Angiogram Head w/o Contrast    Narrative    EXAM: MR BRAIN W/O and W CONTRAST, MRA NECK (CAROTIDS) W/O and W CONTRAST, MRA BRAIN ([place]) W/O CONTRAST  LOCATION: [facility]  DATE/TIME: 12/9/2024 10:20 PM CST    INDICATION: dizziness, dysmetria RUE, h o stroke  COMPARISON: None.  CONTRAST: 10mL Gadavist   TECHNIQUE:   1) Routine multiplanar multisequence head MRI with and without intravenous contrast.  2) 3D time-of-flight head MRA without intravenous contrast.  3) Neck MRA without and with IV contrast. Stenosis measurements made according to NASCET criteria unless otherwise specified.    FINDINGS:  HEAD MRI:  INTRACRANIAL CONTENTS: Large acute versus subacute right paramedian cerebellar hemisphere infarct with mild associated mass effect. No hemorrhagic transformation. Right PCA territory encephalomalacia with cortical laminar necrosis from prior infarct.   Additional small region of encephalomalacia in the right frontal operculum, also likely secondary to remote infarct. No mass, acute hemorrhage, or extra-axial fluid collections. Normal brain parenchymal signal. Normal ventricles and sulci. Normal   Position of the cerebellar tonsils. No pathologic contrast enhancement.    SELLA: No abnormality accounting for technique.    OSSEOUS STRUCTURES/SOFT TISSUES: Normal marrow signal.     ORBITS: No abnormality accounting for technique.     SINUSES/MASTOIDS: No paranasal sinus mucosal disease. No middle ear or mastoid effusion.     HEAD MRA:   ANTERIOR CIRCULATION: No stenosis/occlusion, aneurysm, or high flow vascular malformation. Standard [place] anatomy.    POSTERIOR CIRCULATION: No stenosis/occlusion of the major arteries,  aneurysm, or high flow vascular malformation. The right superior cerebellar artery is likely occluded and the right AICA is not visualized. Balanced vertebral arteries supply a normal   basilar artery.     NECK MRA:   RIGHT CAROTID: No measurable stenosis or dissection.    LEFT CAROTID: No measurable stenosis or dissection.    VERTEBRAL ARTERIES: No focal stenosis or dissection. Balanced vertebral arteries.    AORTIC ARCH: Classic aortic arch anatomy with no significant stenosis at the origin of the great vessels.      Impression    IMPRESSION:  HEAD MRI:   1.  Large acute versus subacute right cerebellar hemisphere infarct with mild associated edema. No significant posterior fossa mass effect. No hemorrhagic transformation.   2.  Remote right PCA territory infarct and likely small right frontal operculum infarct.    HEAD MRA:   1.  No large vessel occlusion or hemodynamically significant stenosis.  2.  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     NECK MRA:  1.  Normal neck MRA.    Findings discussed with Dr. Agarwal 12/9/2024 10:40 PM CST   MRA Angiogram Neck w/o & w Contrast    Narrative    EXAM: MR BRAIN W/O and W CONTRAST, MRA NECK (CAROTIDS) W/O and W CONTRAST, MRA BRAIN ([place]) W/O CONTRAST  LOCATION: [facility]  DATE/TIME: 12/9/2024 10:20 PM CST    INDICATION: dizziness, dysmetria RUE, h o stroke  COMPARISON: None.  CONTRAST: 10mL Gadavist   TECHNIQUE:   1) Routine multiplanar multisequence head MRI with and without intravenous contrast.  2) 3D time-of-flight head MRA without intravenous contrast.  3) Neck MRA without and with IV contrast. Stenosis measurements made according to NASCET criteria unless otherwise specified.    FINDINGS:  HEAD MRI:  INTRACRANIAL CONTENTS: Large acute versus subacute right paramedian cerebellar hemisphere infarct with mild associated mass effect. No hemorrhagic transformation. Right PCA  territory encephalomalacia with cortical laminar necrosis from prior infarct.   Additional small region of encephalomalacia in the right frontal operculum, also likely secondary to remote infarct. No mass, acute hemorrhage, or extra-axial fluid collections. Normal brain parenchymal signal. Normal ventricles and sulci. Normal   Position of the cerebellar tonsils. No pathologic contrast enhancement.    SELLA: No abnormality accounting for technique.    OSSEOUS STRUCTURES/SOFT TISSUES: Normal marrow signal.     ORBITS: No abnormality accounting for technique.     SINUSES/MASTOIDS: No paranasal sinus mucosal disease. No middle ear or mastoid effusion.     HEAD MRA:   ANTERIOR CIRCULATION: No stenosis/occlusion, aneurysm, or high flow vascular malformation. Standard [place] anatomy.    POSTERIOR CIRCULATION: No stenosis/occlusion of the major arteries, aneurysm, or high flow vascular malformation. The right superior cerebellar artery is likely occluded and the right AICA is not visualized. Balanced vertebral arteries supply a normal   basilar artery.     NECK MRA:   RIGHT CAROTID: No measurable stenosis or dissection.    LEFT CAROTID: No measurable stenosis or dissection.    VERTEBRAL ARTERIES: No focal stenosis or dissection. Balanced vertebral arteries.    AORTIC ARCH: Classic aortic arch anatomy with no significant stenosis at the origin of the great vessels.      Impression    IMPRESSION:  HEAD MRI:   1.  Large acute versus subacute right cerebellar hemisphere infarct with mild associated edema. No significant posterior fossa mass effect. No hemorrhagic transformation.   2.  Remote right PCA territory infarct and likely small right frontal operculum infarct.    HEAD MRA:   1.  No large vessel occlusion or hemodynamically significant stenosis.  2.  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     NECK MRA:  1.  Normal neck MRA.    Findings discussed with   Any 12/9/2024 10:40 PM CST

## 2024-12-10 NOTE — PROGRESS NOTES
RECEIVING UNIT ED HANDOFF REVIEW    ED Nurse Handoff Report was reviewed by: Candace Hernandez RN on December 10, 2024 at 11:26 AM

## 2024-12-10 NOTE — CONSULTS
"[facility]    Stroke Telephone Note    I was called by Alhaji Agarwal on 12/09/24 regarding patient Mundo Kern. The patient is a 49 year old man with history of multiple prior embolic strokes of indeterminate source, most recently a left MCA patchy infarct in 7/2023, not taking any of his secondary prevention meds, who presented for 2-3 days of dysequilibrium. He took two ASA of unknown dose today at home.    Vitals  BP: (!) 147/94   Pulse: 83   Resp: 18   Temp: 98.4  F (36.9  C)   Weight: 81.6 kg (180 lb)    Imaging Findings  MRI brain- acute right SCA territory infarct, new since prior imaging, but chronic right PCA infarct  MRA- no cervical or intracranial vertebral artery or basilar artery stenoses, right SCA occlusion (new since prior)    Impression  Acute ischemic stroke of right cerebellum due to embolic stroke of undetermined source (ESUS)    Recommendations  - Use orderset: \"Ischemic Stroke Routine Admission\" or \"Ischemic Stroke No Thrombolytics/No Thrombectomy ICU Admission\"  - Place Neurology IP Stroke Consult order   - Neurochecks and Vital Signs every 4 hours   - No ongoing utility for permissive HTN; continue home BP meds, long term goal normotension, treat with IV PRNs for SBP < 220 mmHg  - Daily aspirin 325 mg for secondary stroke prevention  - No need for plavix load at this time since stroke onset was 2-3 days prior  - Statin: atorvastatin 40 mg daily, adjustment pending LDL  - TTE with Bubble Study  - Telemetry, EKG  - Bedside Glucose Monitoring  - Nutrition: pending swallow screen  - A1c, Lipid Panel, Troponin x 3  - PT/OT/SLP  - Stroke Education  - Smoking Cessation  - Euthermia, Euglycemia    My recommendations are based on the information provided over the phone by Mundo Kern's in-person providers. They are not intended to replace the clinical judgment of his in-person providers. I was not requested to personally see or examine the patient at this time. I " "spent 10 minutes discussing his care and reviewing images with his in person providers.    Breonna Morse MD  Vascular Neurology    To page me or covering stroke neurology team member, click here: [email or web address]  Choose \"On Call\" tab at top, then select \"NEUROLOGY/ALL SITES\" from middle drop-down box, press Enter, then look for \"stroke\" or \"telestroke\" for your site.    "

## 2024-12-10 NOTE — PLAN OF CARE
Pt here with acute stroke. A&Ox4. Neuros intact ex baseline L field cut, RUE ataxia. VSS. Tele SR. Reg diet, thin liquids. Takes pills whole. Up with SBA. Denies pain. Pt scoring green on the Aggression Stop Light Tool. Plan TTE, PT/OT/ST evals, heme/onco eval, MARIA TERESA tomorrow NPO after midnight, CT C/A/P completed this afternoon results pending. Discharge plan pending work up.

## 2024-12-10 NOTE — PROGRESS NOTES
[facility]    Hospitalist Progress Note    Interval History   - Patient reports ongoing mild dizziness, nausea is improved  - He reports ongoing alcohol and tobacco abuse. He states that he is serious about quitting. I discussed with him that this was the recommendation for every prior stroke. I encouraged him to come up with a plan  - Girlfriend Yadi at bedside updated    Assessment & Plan   Summary: Mundo Kern is a 49 year old male with PMH multiple prior embolic strokes of undetermined source, HLD, alcohol use, who was admitted on 12/9/2024 with a cerebellar stroke.    Cerebellar CVA, likely ESUS  Hx CVA ESUS  Hyperlipidemia  History of L MCA stroke in 7/2023, known age-indeterminant right frontal and occipital strokes found on imaging in Apr 2024. Patient reports not taking his ASA prior to admission. Presents with loss of equilibrium with n/v x 2-3 days. No extremity weakness. Some coordination concerns. No vision changes. In ED AFVSS. WBC 13.1.  MRI w/ large acute vs subacute R cerebellar CVA w/ mild edema, no hemorrhagic transformation. MRA w/ likely occlusion of R superior cerebellar artery and R AICA is not visualized.   CT C/A/P notable for wedged shaped hypoattenuation of both kidneys concerning for pyelo vs renal infarction.   , TGs 162.  - Appreciate Neuro consult  - Telemetry  - Q4h neuro checks  - HemOnc consult for hypercoagulability  - TTE MARIA TERESA with bubble study  - Long term BP control < 130/80  - ASA 325mg  - PT/OT/SLP  - Atorvastatin 40mg daily (not taking PTA)    Tobacco use disorder  Smokes 1ppd. Declines NRT  - Encouraged cessation    Alcohol use  Drinks 6 pack of beer ~4x/ week. No significant withdrawal noted here  - CIWA  - Encouraged cessation    Clinically Significant Risk Factors Present on Admission                             # Overweight: Estimated body mass index is 25.1 kg/m  as calculated from the following:    Height as of this encounter: 1.803  "m (5' 11\").    Weight as of this encounter: 81.6 kg (180 lb).       # Financial/Environmental Concerns: none          PT/OT: ordered  Diet: Regular Diet Adult  NPO per Anesthesia Guidelines for Procedure/Surgery Except for: Meds    DVT Prophylaxis: Pneumatic Compression Devices  Velazquez Catheter: Not present  Lines: None     Cardiac Monitoring: ACTIVE order. Indication: Stroke, acute (48 hours)  Code Status: Full Code    Medically Ready for Discharge: Anticipated Tomorrow      Oscar Camarillo MD  Hospitalist Service  [facility]  Securely message with [employer] (more info)  Text page via [facility] Paging/Directory     - Total time spent on encounter is 45 minutes, which includes counseling, coordination of care, time spent discussing with family, and/or time spent discussing with consultants.    Data reviewed today: I reviewed all new labs and imaging results over the last 24 hours.    Physical Exam   Temp: 98  F (36.7  C) Temp src: Oral BP: (!) 157/91 Pulse: 94   Resp: 12 SpO2: 96 % O2 Device: None (Room air)    Vitals:    12/09/24 1539   Weight: 81.6 kg (180 lb)     Vital Signs with Ranges  Temp:  [98  F (36.7  C)-98.4  F (36.9  C)] 98  F (36.7  C)  Pulse:  [52-94] 94  Resp:  [12-21] 12  BP: (103-157)/() 157/91  SpO2:  [95 %-97 %] 96 %  No intake/output data recorded.  O2 requirements: none    Constitutional: Male in NAD  HEENT: Eyes nonicteric, oral mucosa moist  Cardiovascular: RRR, normal S1/2, no m/r/g  Respiratory: CTAB, no wheezing or crackles  Vascular: No LE pitting edema  GI: Normoactive bowel sounds, nontender, nondistended  Skin/Integumen: No rashes  Neuro/Psych: Appropriate affect and mood. A&Ox3, noted dysmetria on finger to nose on the right    Medications   Current Facility-Administered Medications   Medication Dose Route Frequency Provider Last Rate Last Admin     Current Facility-Administered Medications   Medication Dose Route Frequency Provider Last Rate Last Admin    " aspirin (ASA) EC tablet 325 mg  325 mg Oral Daily Luis David MD        Or    aspirin (ASA) chewable tablet 324 mg  324 mg Oral or NG Tube Daily Luis David MD   324 mg at 12/10/24 0818    Or    aspirin (ASA) Suppository 300 mg  300 mg Rectal Daily Luis David MD        atorvastatin (LIPITOR) tablet 40 mg  40 mg Oral or Feeding Tube QPM Luis David MD   40 mg at 12/10/24 0449    senna-docusate (SENOKOT-S/PERICOLACE) 8.6-50 MG per tablet 1-2 tablet  1-2 tablet Oral or NG Tube BID Luis David MD        sodium chloride (PF) 0.9% PF flush 3 mL  3 mL Intracatheter Q8H Luis David MD   3 mL at 12/10/24 0818       Data   Recent Labs   Lab 12/10/24  1015 12/10/24  0752 12/09/24  1705   WBC 15.3*  --  13.1*   HGB 16.6  --  17.6   MCV 88  --  87     --  323     --  136   POTASSIUM 3.4  --  3.9   CHLORIDE 104  --  103   CO2 21*  --  21*   BUN 11.0  --  10.7   CR 1.02  --  0.97   ANIONGAP 12  --  12   SOFI 8.9  --  9.4   * 97 95   ALBUMIN  --   --  4.4   PROTTOTAL  --   --  7.0   BILITOTAL  --   --  1.0   ALKPHOS  --   --  88   ALT  --   --  24   AST  --   --  32       Imaging:   Recent Results (from the past 24 hours)   MR Brain w/o & w Contrast    Narrative    EXAM: MR BRAIN W/O and W CONTRAST, MRA NECK (CAROTIDS) W/O and W CONTRAST, MRA BRAIN ([place]) W/O CONTRAST  LOCATION: [facility]  DATE/TIME: 12/9/2024 10:20 PM CST    INDICATION: dizziness, dysmetria RUE, h o stroke  COMPARISON: None.  CONTRAST: 10mL Gadavist   TECHNIQUE:   1) Routine multiplanar multisequence head MRI with and without intravenous contrast.  2) 3D time-of-flight head MRA without intravenous contrast.  3) Neck MRA without and with IV contrast. Stenosis measurements made according to NASCET criteria unless otherwise specified.    FINDINGS:  HEAD MRI:  INTRACRANIAL CONTENTS: Large acute versus subacute right paramedian  cerebellar hemisphere infarct with mild associated mass effect. No hemorrhagic transformation. Right PCA territory encephalomalacia with cortical laminar necrosis from prior infarct.   Additional small region of encephalomalacia in the right frontal operculum, also likely secondary to remote infarct. No mass, acute hemorrhage, or extra-axial fluid collections. Normal brain parenchymal signal. Normal ventricles and sulci. Normal   Position of the cerebellar tonsils. No pathologic contrast enhancement.    SELLA: No abnormality accounting for technique.    OSSEOUS STRUCTURES/SOFT TISSUES: Normal marrow signal.     ORBITS: No abnormality accounting for technique.     SINUSES/MASTOIDS: No paranasal sinus mucosal disease. No middle ear or mastoid effusion.     HEAD MRA:   ANTERIOR CIRCULATION: No stenosis/occlusion, aneurysm, or high flow vascular malformation. Standard [place] anatomy.    POSTERIOR CIRCULATION: No stenosis/occlusion of the major arteries, aneurysm, or high flow vascular malformation. The right superior cerebellar artery is likely occluded and the right AICA is not visualized. Balanced vertebral arteries supply a normal   basilar artery.     NECK MRA:   RIGHT CAROTID: No measurable stenosis or dissection.    LEFT CAROTID: No measurable stenosis or dissection.    VERTEBRAL ARTERIES: No focal stenosis or dissection. Balanced vertebral arteries.    AORTIC ARCH: Classic aortic arch anatomy with no significant stenosis at the origin of the great vessels.      Impression    IMPRESSION:  HEAD MRI:   1.  Large acute versus subacute right cerebellar hemisphere infarct with mild associated edema. No significant posterior fossa mass effect. No hemorrhagic transformation.   2.  Remote right PCA territory infarct and likely small right frontal operculum infarct.    HEAD MRA:   1.  No large vessel occlusion or hemodynamically significant stenosis.  2.  Likely occlusion of the right superior cerebellar artery  and the right AICA is not visualized.     NECK MRA:  1.  Normal neck MRA.    Findings discussed with Dr. Agarwal 12/9/2024 10:40 PM CST   MRA Angiogram Head w/o Contrast    Narrative    EXAM: MR BRAIN W/O and W CONTRAST, MRA NECK (CAROTIDS) W/O and W CONTRAST, MRA BRAIN ([place]) W/O CONTRAST  LOCATION: [facility]  DATE/TIME: 12/9/2024 10:20 PM CST    INDICATION: dizziness, dysmetria RUE, h o stroke  COMPARISON: None.  CONTRAST: 10mL Gadavist   TECHNIQUE:   1) Routine multiplanar multisequence head MRI with and without intravenous contrast.  2) 3D time-of-flight head MRA without intravenous contrast.  3) Neck MRA without and with IV contrast. Stenosis measurements made according to NASCET criteria unless otherwise specified.    FINDINGS:  HEAD MRI:  INTRACRANIAL CONTENTS: Large acute versus subacute right paramedian cerebellar hemisphere infarct with mild associated mass effect. No hemorrhagic transformation. Right PCA territory encephalomalacia with cortical laminar necrosis from prior infarct.   Additional small region of encephalomalacia in the right frontal operculum, also likely secondary to remote infarct. No mass, acute hemorrhage, or extra-axial fluid collections. Normal brain parenchymal signal. Normal ventricles and sulci. Normal   Position of the cerebellar tonsils. No pathologic contrast enhancement.    SELLA: No abnormality accounting for technique.    OSSEOUS STRUCTURES/SOFT TISSUES: Normal marrow signal.     ORBITS: No abnormality accounting for technique.     SINUSES/MASTOIDS: No paranasal sinus mucosal disease. No middle ear or mastoid effusion.     HEAD MRA:   ANTERIOR CIRCULATION: No stenosis/occlusion, aneurysm, or high flow vascular malformation. Standard [place] anatomy.    POSTERIOR CIRCULATION: No stenosis/occlusion of the major arteries, aneurysm, or high flow vascular malformation. The right superior cerebellar artery is likely occluded and the  right AICA is not visualized. Balanced vertebral arteries supply a normal   basilar artery.     NECK MRA:   RIGHT CAROTID: No measurable stenosis or dissection.    LEFT CAROTID: No measurable stenosis or dissection.    VERTEBRAL ARTERIES: No focal stenosis or dissection. Balanced vertebral arteries.    AORTIC ARCH: Classic aortic arch anatomy with no significant stenosis at the origin of the great vessels.      Impression    IMPRESSION:  HEAD MRI:   1.  Large acute versus subacute right cerebellar hemisphere infarct with mild associated edema. No significant posterior fossa mass effect. No hemorrhagic transformation.   2.  Remote right PCA territory infarct and likely small right frontal operculum infarct.    HEAD MRA:   1.  No large vessel occlusion or hemodynamically significant stenosis.  2.  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     NECK MRA:  1.  Normal neck MRA.    Findings discussed with Dr. Agarwal 12/9/2024 10:40 PM CST   MRA Angiogram Neck w/o & w Contrast    Narrative    EXAM: MR BRAIN W/O and W CONTRAST, MRA NECK (CAROTIDS) W/O and W CONTRAST, MRA BRAIN ([place]) W/O CONTRAST  LOCATION: [facility]  DATE/TIME: 12/9/2024 10:20 PM CST    INDICATION: dizziness, dysmetria RUE, h o stroke  COMPARISON: None.  CONTRAST: 10mL Gadavist   TECHNIQUE:   1) Routine multiplanar multisequence head MRI with and without intravenous contrast.  2) 3D time-of-flight head MRA without intravenous contrast.  3) Neck MRA without and with IV contrast. Stenosis measurements made according to NASCET criteria unless otherwise specified.    FINDINGS:  HEAD MRI:  INTRACRANIAL CONTENTS: Large acute versus subacute right paramedian cerebellar hemisphere infarct with mild associated mass effect. No hemorrhagic transformation. Right PCA territory encephalomalacia with cortical laminar necrosis from prior infarct.   Additional small region of  encephalomalacia in the right frontal operculum, also likely secondary to remote infarct. No mass, acute hemorrhage, or extra-axial fluid collections. Normal brain parenchymal signal. Normal ventricles and sulci. Normal   Position of the cerebellar tonsils. No pathologic contrast enhancement.    SELLA: No abnormality accounting for technique.    OSSEOUS STRUCTURES/SOFT TISSUES: Normal marrow signal.     ORBITS: No abnormality accounting for technique.     SINUSES/MASTOIDS: No paranasal sinus mucosal disease. No middle ear or mastoid effusion.     HEAD MRA:   ANTERIOR CIRCULATION: No stenosis/occlusion, aneurysm, or high flow vascular malformation. Standard [place] of [place] anatomy.    POSTERIOR CIRCULATION: No stenosis/occlusion of the major arteries, aneurysm, or high flow vascular malformation. The right superior cerebellar artery is likely occluded and the right AICA is not visualized. Balanced vertebral arteries supply a normal   basilar artery.     NECK MRA:   RIGHT CAROTID: No measurable stenosis or dissection.    LEFT CAROTID: No measurable stenosis or dissection.    VERTEBRAL ARTERIES: No focal stenosis or dissection. Balanced vertebral arteries.    AORTIC ARCH: Classic aortic arch anatomy with no significant stenosis at the origin of the great vessels.      Impression    IMPRESSION:  HEAD MRI:   1.  Large acute versus subacute right cerebellar hemisphere infarct with mild associated edema. No significant posterior fossa mass effect. No hemorrhagic transformation.   2.  Remote right PCA territory infarct and likely small right frontal operculum infarct.    HEAD MRA:   1.  No large vessel occlusion or hemodynamically significant stenosis.  2.  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     NECK MRA:  1.  Normal neck MRA.    Findings discussed with Dr. Agarwal 12/9/2024 10:40 PM CST   CT Chest/Abdomen/Pelvis w Contrast    Narrative    CT  CHEST/ABDOMEN/PELVIS W CONTRAST 12/10/2024 12:36 PM    CLINICAL HISTORY: recurrent stroke r/o underlying malignancy    TECHNIQUE: CT scan of the chest, abdomen, and pelvis was performed  without IV contrast. Multiplanar reformats were obtained. Dose  reduction techniques were used.   CONTRAST: None.    COMPARISON: CT abdomen and pelvis performed on 7/19/2005    FINDINGS:   LUNGS AND PLEURA: No pleural effusion or pneumothorax is present.  Subsegmental atelectasis is seen in the lung bases. Calcified  granulomas present in the right middle lobe. Punctate calcified  granuloma is noted within the left lower lobe.    MEDIASTINUM/AXILLAE: No lymphadenopathy. No thoracic aortic aneurysms.    CORONARY ARTERY CALCIFICATION: Mild.    HEPATOBILIARY: No significant mass or bile duct dilatation. No  calcified gallstones.     PANCREAS: No significant mass, duct dilatation, or inflammatory  change.    SPLEEN: Normal size.    ADRENAL GLANDS: No significant nodules.    KIDNEYS/BLADDER: No hydronephrosis is present. Wedge-shaped area of  hypoattenuation is seen along the inferior pole of the right kidney  (series 2, image 74). Similar appearing focus is also seen along the  inferior pole of the left kidney (series 6, image 58).    BOWEL: Diverticulosis in the colon. No acute inflammatory change. No  obstruction.     LYMPH NODES: No lymphadenopathy.    VASCULATURE: No abdominal aortic aneurysm.    PELVIC ORGANS: No pelvic masses.    OTHER: None.    MUSCULOSKELETAL: Mild degenerative changes are seen in the spine.  Sclerotic changes are seen along the superior and inferior endplates  of T8.      Impression    IMPRESSION:  1.  Wedge-shaped areas of hypoattenuation are noted along the inferior  poles of both kidneys. Findings can be seen in pyelonephritis although  no significant surrounding perinephric stranding is evident. Recommend  correlation with urinalysis. Renal infarction is also in the  differential diagnosis.    HE  MD MAINE         SYSTEM ID:  [id number]

## 2024-12-10 NOTE — CONSULTS
[facility]    Stroke Consult Note    Reason for Consult:  Stroke    Chief Complaint: Dizziness (/) and Vomiting    HPI  Mundo Kern is a 49 year old man with a history of HLD, tobacco use, alcohol use, and multiple embolic strokes of undetermined source: left MCA stroke in 7/2023, and age-indeterminant right frontal and occipital strokes found on imaging in Apr 2024. Negative atherosclerotic, cardioembolic, and hypercoagulable work-up. He presented to the [facility] ED on 12/9 with nausea/vomiting, loss of equilibrium, and diarrhea that began Friday 12/6 (4 days ago), found to have large right cerebellar hemisphere infarct. Hadn't been taking his prescribed aspirin 325mg or atorvastatin 40mg.    On admission, /94, MRI brain showed an acute infarct of right cerebellum mainly involving right SCA territory. MRA head and neck revealed occlusion of right superior cerebellar artery. CBC showing leukocytosis, A1c at goal, .     mRS: 1    Stroke Evaluation Summarized    MRI/Head CT Large right cerebellar hemisphere infarct, no hemorrhage, no mass effect   Intracranial Vasculature No large vessel occlusion; likely right superior cerebellar artery occlusion. Could not visualize right AICA   Cervical Vasculature Normal     Echocardiogram Ordered, pending    EKG/Telemetry Normal sinus rhythm   Other Testing Not Applicable      LDL  12/9/2024: 145 mg/dL   A1C  12/9/2024: 5.3 %   Troponin 12/9/2024: <6 ng/L       Impression  Acute ischemic stroke of right cerebellum due to embolic stroke of undetermined source (ESUS)  History of ischemic strokes  Medication noncompliance  Tobaccoism   Et0h abuse          49-year-old male with past medical history significant for hyperlipidemia, Tobaccoism   Et0h abuse, medication noncompliance and ischemic strokes who presented with 4 days of gait instability, disequilibrium, nausea/vomiting and diarrhea. On admission, /94, MRI brain showed an  "acute infarct of right cerebellum mainly involving right SCA territory. MRA head and neck revealed occlusion of right superior cerebellar artery. CBC showing leukocytosis, A1c at goal, .  Etiology of recurrent ischemic strokes is likely ESUS, additional contributors include medication noncompliance, smoking/Et0h abuse and some vascular risk factors.  Given relatively young age would like to rule out additional causes of his ischemic stroke including but not limited to hypercoagulable and cardioembolic source for his strokes.  Will get a CT chest abdomen pelvis to rule out malignancy, will also let heme-onc weigh in on potential hypercoagulability as the cause of his recurrent ischemic strokes.       Recommendations   - Consult heme-onc for hypercoagulability work-up  - CT chest/abdomen/pelvis to r/o malignancy  - TTE, MARIA TERESA with bubble study  - Use orderset: \"Ischemic Stroke Routine Admission\" or \"Ischemic Stroke No Thrombolytics/No Thrombectomy ICU Admission\"  - Neurochecks and Vital Signs every 4 hours   - Long-term blood pressure goal <130/80  - Daily aspirin 325 mg for secondary stroke prevention  - Statin: atorvastatin 40mg daily  - Telemetry, EKG  - Bedside Glucose Monitoring  - Nutrition: pending swallow screen  - PT/OT/SLP  - Stroke Education  - Smoking Cessation  - Euthermia, Euglycemia    Patient Follow-up    - final recommendation pending work-up    Thank you for this consult. We will continue to follow.     The Stroke Fellow is Dr. Rahman. The Stroke Staff is Dr. Redman.    Trang Carroll  Medical Student  To page a member of the stroke/neurocritical care service, click here:  [facility]COM   Choose \"On Call\" tab at top, then search dropdown box for \"Neurology Adult\", select location, press Enter, then look for stroke/neuro ICU/telestroke.  _____________________________________________________    Clinically Significant Risk Factors Present on Admission                                         Past Medical " History    Past Medical History:   Diagnosis Date    Cerebrovascular accident (H)     Tobacco use disorder      Medications   Home Meds  Prior to Admission medications    Medication Sig Start Date End Date Taking? Authorizing Provider   aspirin (ASA) 325 MG EC tablet Take 1 tablet (325 mg) by mouth daily 8/13/23  Nghia Mora MD   atorvastatin (LIPITOR) 40 MG tablet Take 1 tablet (40 mg) by mouth every evening 7/24/23  Nghia Mora MD   clopidogrel (PLAVIX) 75 MG tablet Take 1 tablet (75 mg) by mouth daily for 19 days 7/25/23 8/13/23 Nghia Mora MD       Scheduled Meds  Current Facility-Administered Medications   Medication Dose Route Frequency Provider Last Rate Last Admin    aspirin (ASA) EC tablet 325 mg  325 mg Oral Daily Luis David MD        Or    aspirin (ASA) chewable tablet 324 mg  324 mg Oral or NG Tube Daily Luis David MD   324 mg at 12/10/24 0010    Or    aspirin (ASA) Suppository 300 mg  300 mg Rectal Daily Luis David MD        atorvastatin (LIPITOR) tablet 40 mg  40 mg Oral or Feeding Tube QPM Luis David MD   40 mg at 12/10/24 0449    senna-docusate (SENOKOT-S/PERICOLACE) 8.6-50 MG per tablet 1-2 tablet  1-2 tablet Oral or NG Tube BID Luis David MD        sodium chloride (PF) 0.9% PF flush 3 mL  3 mL Intracatheter Q8H Luis David MD           Infusion Meds  Current Facility-Administered Medications   Medication Dose Route Frequency Provider Last Rate Last Admin       Allergies   No Known Allergies       PHYSICAL EXAMINATION   Temp:  [98  F (36.7  C)-98.4  F (36.9  C)] 98  F (36.7  C)  Pulse:  [52-83] 67  Resp:  [12-21] 15  BP: (103-147)/() 111/72  SpO2:  [95 %-97 %] 96 %    Neurologic  Mental Status:  alert, oriented x 3, follows commands, speech dysarthric but patient states its his baseline speech and fluent, naming and repetition normal  Cranial Nerves:  visual fields : Left  superior temporal field defect, facial movements symmetric, hearing not formally tested but intact to conversation, no dysarthria  Motor:  normal muscle tone and bulk, no abnormal movements, able to move all limbs spontaneously, no pronator drift, strength full in all 4 extremities.  Slowed rapid movements on right  Coordination: Dysmetria on finger-to-nose and heel-to-shin on right  Station/Gait:  deferred    Stroke Scales    NIHSS  1a. Level of Consciousness  0   1b. LOC Questions  0   1c. LOC Commands  0   2.   Best Gaze  0   3.   Visual  1   4.   Facial Palsy  0   5a. Motor Arm, Left  0   5b. Motor Arm, Right  0   6a. Motor Leg, Left  0   6b. Motor Leg, right  0   7.   Limb Ataxia  2   8.   Sensory  0   9.   Best Language  0   10. Dysarthria  1   11. Extinction and Inattention   0   Total  4       Imaging  I personally reviewed all imaging; relevant findings per HPI.    Labs Data   CBC  Recent Labs   Lab 12/09/24  1705   WBC 13.1*   RBC 5.75   HGB 17.6   HCT 49.9        Basic Metabolic Panel   Recent Labs   Lab 12/09/24  1705      POTASSIUM 3.9   CHLORIDE 103   CO2 21*   BUN 10.7   CR 0.97   GLC 95   SOFI 9.4     Liver Panel  Recent Labs   Lab 12/09/24  1705   PROTTOTAL 7.0   ALBUMIN 4.4   BILITOTAL 1.0   ALKPHOS 88   AST 32   ALT 24     INR    Recent Labs   Lab Test 07/23/23  1714 07/22/23  1914   INR 1.05 0.96           Stroke Consult Data Data   This was a non-emergent, non-telestroke consult.

## 2024-12-10 NOTE — PLAN OF CARE
Goal Outcome Evaluation:      Plan of Care Reviewed With: patient    Overall Patient Progress: no changeOverall Patient Progress: no change    Outcome Evaluation: Pt will discharge home when medically ready pending therapy reccomendations.

## 2024-12-10 NOTE — ED NOTES
[facility]  ED Nurse Handoff Report    ED Chief complaint: Dizziness (/) and Vomiting      ED Diagnosis:   Final diagnoses:   Acute ischemic stroke (H)       Code Status: Admitting MD to discuss    Allergies: No Known Allergies    Patient Story: Pt arrives via triage presenting with multiple complaints. Per pt, he has been throwing up since Friday and is now concerned for dehydration. Pt says he feels weak and hasn't had the strength to walk since Friday.      Focused Assessment:  A & Ox4.20g Rt AC. A & Ox4.     Labs Ordered and Resulted from Time of ED Arrival to Time of ED Departure   COMPREHENSIVE METABOLIC PANEL - Abnormal       Result Value    Sodium 136      Potassium 3.9      Carbon Dioxide (CO2) 21 (*)     Anion Gap 12      Urea Nitrogen 10.7      Creatinine 0.97      GFR Estimate >90      Calcium 9.4      Chloride 103      Glucose 95      Alkaline Phosphatase 88      AST 32      ALT 24      Protein Total 7.0      Albumin 4.4      Bilirubin Total 1.0     CBC WITH PLATELETS AND DIFFERENTIAL - Abnormal    WBC Count 13.1 (*)     RBC Count 5.75      Hemoglobin 17.6      Hematocrit 49.9      MCV 87      MCH 30.6      MCHC 35.3      RDW 12.7      Platelet Count 323      % Neutrophils 67      % Lymphocytes 23      % Monocytes 9      % Eosinophils 1      % Basophils 1      % Immature Granulocytes 0      NRBCs per 100 WBC 0      Absolute Neutrophils 8.8 (*)     Absolute Lymphocytes 3.0      Absolute Monocytes 1.2      Absolute Eosinophils 0.1      Absolute Basophils 0.1      Absolute Immature Granulocytes 0.1      Absolute NRBCs 0.0       MR Brain w/o & w Contrast   Final Result   IMPRESSION:   HEAD MRI:    1.  Large acute versus subacute right cerebellar hemisphere infarct with mild associated edema. No significant posterior fossa mass effect. No hemorrhagic transformation.    2.  Remote right PCA territory infarct and likely small right frontal operculum infarct.      HEAD MRA:    1.  No large  vessel occlusion or hemodynamically significant stenosis.   2.  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       NECK MRA:   1.  Normal neck MRA.      Findings discussed with Dr. Agarwal 12/9/2024 10:40 PM CST      MRA Angiogram Head w/o Contrast   Final Result   IMPRESSION:   HEAD MRI:    1.  Large acute versus subacute right cerebellar hemisphere infarct with mild associated edema. No significant posterior fossa mass effect. No hemorrhagic transformation.    2.  Remote right PCA territory infarct and likely small right frontal operculum infarct.      HEAD MRA:    1.  No large vessel occlusion or hemodynamically significant stenosis.   2.  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       NECK MRA:   1.  Normal neck MRA.      Findings discussed with Dr. Agarwal 12/9/2024 10:40 PM CST      MRA Angiogram Neck w/o & w Contrast   Final Result   IMPRESSION:   HEAD MRI:    1.  Large acute versus subacute right cerebellar hemisphere infarct with mild associated edema. No significant posterior fossa mass effect. No hemorrhagic transformation.    2.  Remote right PCA territory infarct and likely small right frontal operculum infarct.      HEAD MRA:    1.  No large vessel occlusion or hemodynamically significant stenosis.   2.  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       NECK MRA:   1.  Normal neck MRA.      Findings discussed with Dr. Agarwal 12/9/2024 10:40 PM CST          To be done/followed up on inpatient unit:  Admitting MD orders    Does this patient have any cognitive concerns?:  none    Activity level - Baseline/Home:  Independent  Activity Level - Current:   Independent  Patient's Preferred language: English   Needed?: No    Isolation: None  Infection: Not Applicable  Patient tested for COVID 19 prior to admission: NO  Bariatric?:  "No    Vital Signs:   Vitals:    12/09/24 1539   BP: (!) 147/94   Pulse: 83   Resp: 18   Temp: 98.4  F (36.9  C)   TempSrc: Oral   SpO2: 97%   Weight: 81.6 kg (180 lb)   Height: 1.803 m (5' 11\")       Cardiac Rhythm:     Was the PSS-3 completed:   Yes  What interventions are required if any?               Family Comments: Mother present  OBS brochure/video discussed/provided to patient/family: No    For the majority of the shift this patient's behavior was Green.   Behavioral interventions performed were frequent rounding.    ED NURSE PHONE NUMBER: [phone]         "

## 2024-12-10 NOTE — CONSULTS
Care Management Initial Consult    General Information  Assessment completed with: Patient,    Type of CM/SW Visit: Initial Assessment    Primary Care Provider verified and updated as needed: Yes   Readmission within the last 30 days: no previous admission in last 30 days      Reason for Consult: discharge planning  Advance Care Planning: Advance Care Planning Reviewed: no concerns identified          Communication Assessment  Patient's communication style: spoken language (English or Bilingual)             Cognitive  Cognitive/Neuro/Behavioral: all  Level of Consciousness: alert  Arousal Level: opens eyes spontaneously  Orientation: oriented x 4  Mood/Behavior: cooperative, calm     Speech: logical, spontaneous, clear    Living Environment:   People in home: parent(s), significant other  THIERRY Charles  Current living Arrangements: house      Able to return to prior arrangements: yes       Family/Social Support:  Care provided by: self, spouse/significant other, parent(s)  Provides care for: no one  Marital Status: Lives with Significant Other  Support system: Parent(s), Significant Other          Description of Support System: Supportive, Involved    Support Assessment: Adequate family and caregiver support    Current Resources:   Patient receiving home care services: No        Community Resources: None  Equipment currently used at home: none  Supplies currently used at home: None    Employment/Financial:  Employment Status: employed full-time        Financial Concerns: none   Referral to Financial Worker: No       Does the patient's insurance plan have a 3 day qualifying hospital stay waiver?  No    Lifestyle & Psychosocial Needs:  Social Drivers of Health     Food Insecurity: Not on file   Depression: Not at risk (4/1/2024)    [facility]    PHQ-2     PHQ-2 TOTAL SCORE: 1   Housing Stability: Not on file   Tobacco Use: High Risk (12/9/2024)    Patient History      "Smoking Tobacco Use: Every Day     Smokeless Tobacco Use: Unknown     Passive Exposure: Not on file   Financial Resource Strain: Not on file   Alcohol Use: Not on file   Transportation Needs: Not on file   Physical Activity: Not on file   Interpersonal Safety: Not on file   Stress: Not on file   Social Connections: Unknown (4/1/2024)    [facility] from [facility]    Social Connections     Frequency of Communication with Friends and Family: Not on file   Health Literacy: Not on file       Functional Status:  Prior to admission patient needed assistance:   Dependent ADLs:: Independent  Dependent IADLs:: Independent  Assesssment of Functional Status: Not at  functional baseline    Mental Health Status:          Chemical Dependency Status:                Values/Beliefs:  Spiritual, Cultural Beliefs, Orthodox Practices, Values that affect care: no               Discussed  Partnership in Safe Discharge Planning  document with patient/family: No    Additional Information:  Per care management consult, chart was reviewed. H&P states pt is a \"49 year old male admitted on 12/9/2024. He presents with loss of equilibrium, n/v, dairrhea.\"    SW met with pt at bedside and introduced self and role. Pts mom, Paty, and his girlfriend also in the room. Pt lives in a duplex with his mom and GF. Pt is totally independent at baseline and works full time in [employer]. Pt has no medical equipment at home. Pt confirmed his emergency contact and PCP. Pt is concerned about his weakness at discharge.     Next Steps: Follow for discharge recommendations.     Yessenia Aj NYA  [facility]  Inpatient Care Management      "

## 2024-12-10 NOTE — PROGRESS NOTES
12/10/24 1500   Appointment Info   Signing Clinician's Name / Credentials (PT) Kim Hargrove Poster, SPT   Student Supervision On-site supervision provided;Direct supervision provided;Line of sight supervision provided;Direct Patient Contact Provided   Living Environment   People in Home significant other   Current Living Arrangements other (see comments)  (duplex)   Home Accessibility stairs to enter home   Number of Stairs, Main Entrance greater than 10 stairs   Stair Railings, Main Entrance railings safe and in good condition   Transportation Anticipated family or friend will provide   Living Environment Comments pt states being able to stay on first floor of duplex with mom but josely lives upstairs with girlfriend.   Self-Care   Usual Activity Tolerance moderate   Current Activity Tolerance poor   Regular Exercise No   Equipment Currently Used at Home none   Fall history within last six months no   Activity/Exercise/Self-Care Comment  at total wine and other [place].   General Information   Onset of Illness/Injury or Date of Surgery 12/09/24   Referring Physician Luis David MD   Patient/Family Therapy Goals Statement (PT) return home   Pertinent History of Current Problem (include personal factors and/or comorbidities that impact the POC) 49 year old male with reported history of stroke who presented  to the ER for 2 to 3 days of loss of equilibrium in the setting of nausea, vomiting, diarrhea at onset of symptoms. Imaging revals: MRI w/ large acute vs subacute R cerebellar CVA w/ mild edema, no hemorrhagic transformation. 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 PMHmultiple prior embolic strokes of undetermined source, HLD, alcohol use.   Existing Precautions/Restrictions fall   Cognition   Affect/Mental Status (Cognition) WFL   Orientation Status (Cognition) oriented to;person;place;situation;time   Follows Commands (Cognition) WFL   Safety Deficit (Cognition)  moderate deficit;awareness of need for assistance;impulsivity;judgment;problem-solving;safety precautions awareness   Integumentary/Edema   Integumentary/Edema no deficits were identifed   Posture    Posture Forward head position   Range of Motion (ROM)   Range of Motion ROM is WFL   ROM Comment pt shows good ROM against gravity   Strength (Manual Muscle Testing)   Strength (Manual Muscle Testing) strength is WFL   Strength Comments pt demonstrates antigravity strength.   Bed Mobility   Bed Mobility supine-sit   Comment, (Bed Mobility) SBA   Transfers   Transfers sit-stand transfer   Sit-Stand Transfer   Comment, (Sit-Stand Transfer) CGA   Gait/Stairs (Locomotion)   Comment, (Gait/Stairs) FWW, CGA unsteady gait-ataxic   Balance   Balance Comments loss of seated balance noted when completing heel to shin. pt able to maintain seated balance when leaning forward/donning new socks. pt able to maintain static standing balance but when moving would lose balance to the R.   Sensory Examination   Sensory Perception Comments light touch: in tact   Coordination   Coordination Comments finger to nose: L: WNL, R: dysmetria Heel to shin: R/L WNL   Oculomotor Exam   Smooth Pursuit Normal   VOR   (pt stated feeling dizzy)   VOR Cancellation   (pt stated feeling dizzy during testing)   Clinical Impression   Criteria for Skilled Therapeutic Intervention Yes, treatment indicated   PT Diagnosis (PT) impaired ambulation   Influenced by the following impairments weakness, impaired balance and coordination, activity tolereance   Functional limitations due to impairments decreased functional mobility   Clinical Presentation (PT Evaluation Complexity) stable   Clinical Presentation Rationale see MR   Clinical Decision Making (Complexity) low complexity   Planned Therapy Interventions (PT) balance training;bed mobility training;gait training;home exercise program;neuromuscular re-education;patient/family education;postural re-education;ROM  (range of motion);stair training;strengthening;stretching;transfer training;progressive activity/exercise;risk factor education;home program guidelines;motor coordination training   Risk & Benefits of therapy have been explained care plan/treatment goals reviewed;evaluation/treatment results reviewed;risks/benefits reviewed;current/potential barriers reviewed;participants voiced agreement with care plan;participants included;patient;spouse/significant other   PT Total Evaluation Time   PT Eval, Low Complexity Minutes (06987) 10   Physical Therapy Goals   PT Frequency Daily   PT Predicted Duration/Target Date for Goal Attainment 12/17/24   PT Goals Bed Mobility;Transfers;Gait;Stairs;PT Goal 1   PT: Bed Mobility Rolling;Bridging;Independent   PT: Transfers Independent;Sit to/from stand;Bed to/from chair;Assistive device   PT: Gait Supervision/stand-by assist;Greater than 200 feet   PT: Stairs Supervision/stand-by assist;Greater than 10 stairs;Rail on both sides   PT: Goal 1 Pt will score >23 on the FGA with indication of decreased fall risk and or have a plan in place for consistent use of an AD.   Interventions   Interventions Quick Adds Gait Training;Neuromuscular Re-ed;Therapeutic Procedure;Therapeutic Activity   Therapeutic Procedure/Exercise   Treatment Detail/Skilled Intervention pt edu on benefits of ankle pumps, leg extensions, and seated marches for circulation benefits.   Therapeutic Activity   Therapeutic Activities: dynamic activities to improve functional performance Minutes (47555) 20   Symptoms Noted During/After Treatment Dizziness   Treatment Detail/Skilled Intervention pt in supine with girlfriend sitting bedside when entering the room. pt agreeable to PT. increase time for VS Monitoring. sup>sit SBA. STSx3 CGA with verbal cues to avoid using walker to sit/stand. pt transfered to recliner with cues to reach back with UE to sit. pt asked if he wanted pillows and stated he was fine, call light, phone,  "and table all within reach. pt edu on vestibular function and orthostatic hypotension as possible contributors to \"dizziness\" balance deficits.   Gait Training   Gait Training Minutes (39585) 8   Symptoms Noted During/After Treatment (Gait Training) dizziness   Treatment Detail/Skilled Intervention pt ambulated to bathroom with CGA and FWW with verbal cues to stand tall and keep the walker close. pt ambulated ~40ft and stated feeling dizzy. pt took standing break and then returned to room for an additional ~40ft. pt unsteady while ambulating.   Neuromuscular Re-education   Neuromuscular Re-Education Minutes (14465) 10   Symptoms Noted During/After Treatment dizziness   Treatment Detail/Skilled Intervention standing: pt working on standing balance with CGA and FWW. weight shifts with eyes open/closed 2x30 sec with 30 sec standing breaks. attempted eyes closed with tandem stance and staggered stance, pt maintained balance for 2 seconds. Pt encouraged to keep eyes open for tandem and staggered stance for 3x15s.   PT Discharge Planning   PT Plan Balance and Coordination training, increase ambulation distance, strengthen, trial stairs   PT Discharge Recommendation (DC Rec) [facility] patient will benefit from intensive, interdisciplinary therapy.  Anticipate will be able to tolerate 3 hours of therapy per day   PT Rationale for DC Rec pt significantly under baseline with functional activities. pt is limited by decreased balance and coordination, activity tolerance, and weakness. pt will benefit from 3 hours of skilled re[facility] services at [facility] in order to safely return home. pt highly motivated to return to work, and home. has good support with girlfriend and mother. Will continue to update as appropriate.   PT Brief overview of current status CGA for transfers\"Goals of therapy will be to address safe mobility and make recs for d/c to next level of care. Pt and RN will continue to follow all falls risk " "precautions as documented by RN staff while hospitalized.\"   Physical Therapy Time and Intention   Timed Code Treatment Minutes 38   Total Session Time (sum of timed and untimed services) 48     "

## 2024-12-10 NOTE — PHARMACY-CONSULT NOTE
Pharmacy Consult to evaluate for medication related stroke core measures    Mundo Kern, 49 year old male admitted for Cerebellar CVA on 12/9/2024.    Thrombolytic was not given because of Time from onset contraindications    VTE Prophylaxis  SCDs ordered 12/10  - patient currently boarding in ED and will start when on the floor.    Antithrombotic: aspirin started on 12/10, as appropriate by end of hospital day 2. Continue antithrombotic therapy on discharge to meet quality measures, unless contraindicated.    Anticoagulation if history of A-fib/flutter: Patient does not have history of A-fib/flutter - anticoagulation not required for medication related stroke core measures.     LDL Cholesterol Calculated   Date Value Ref Range Status   12/09/2024 145 (H) <100 mg/dL Final       Patient currently receiving Lipitor (atorvastatin) continue statin on discharge to meet quality measures, unless contraindicated.    Recommendations: None at this time    Thank you for the consult.    Lai Sarmiento [facility] 12/10/2024 10:22 AM

## 2024-12-10 NOTE — PROGRESS NOTES
"Speech-Language Pathology Clinical Swallow Evaluation        12/10/24 1410   Appointment Info   Signing Clinician's Name / Credentials (SLP) Gali Pabon MS, CCC-SLP   General Information   Onset of Illness/Injury or Date of Surgery 12/09/24   Referring Physician Luis David MD   Pertinent History of Current Problem Per neuro consult, \"Mundo Kern is a 49 year old man with a history of HLD, tobacco use, alcohol use, and multiple embolic strokes of undetermined source: left MCA stroke in 7/2023, and age-indeterminant right frontal and occipital strokes found on imaging in Apr 2024. Negative atherosclerotic, cardioembolic, and hypercoagulable work-up. He presented to the [facility] ED on 12/9 with nausea/vomiting, loss of equilibrium, and diarrhea that began Friday 12/6 (4 days ago), found to have large right cerebellar hemisphere infarct. Hadn't been taking his prescribed aspirin 325mg or atorvastatin 40mg.         On admission, /94, MRI brain showed an acute infarct of right cerebellum mainly involving right SCA territory. MRA head and neck revealed occlusion of right superior cerebellar artery. CBC showing leukocytosis, A1c at goal, .\"   General Observations Pt awake/alert and cooperative. Mother and girlfriend present. Pt and visitors endorsing speech currently at baseline (pt reported \"mumbling\" frequently at baseline).   Type of Evaluation   Type of Evaluation Swallow Evaluation   Oral Motor   Oral Musculature   WNL   Structural Abnormalities none present   Dentition (Oral Motor)   Dentition (Oral Motor) natural dentition;adequate dentition   Cough/Swallow/Gag Reflex (Oral Motor)   Volitional Throat Clear/Cough (Oral Motor) WNL   Vocal Quality/Secretion Management (Oral Motor)   Vocal Quality (Oral Motor) WNL   Secretion Management (Oral Motor) WNL   General Swallowing Observations   Past History of Dysphagia None per pt/chart   Respiratory Support room air   Current " Diet/Method of Nutritional Intake (General Swallowing Observations, NIS) regular diet;thin liquids (level 0)   Swallowing Evaluation Clinical swallow evaluation   Clinical Swallow Evaluation   Clinical Swallow Evaluation Textures Trialed thin liquids;pureed;solid foods   Clinical Swallow Eval: Thin Liquid Texture Trial   Mode of Presentation, Thin Liquids cup;straw   Oral Phase of Swallow WFL   Pharyngeal Phase of Swallow intact   Clinical Swallow Evaluation: Puree Solid Texture Trial   Oral Phase, Puree WFL   Pharyngeal Phase, Puree intact   Clinical Swallow Evaluation: Solid Food Texture Trial   Oral Phase WFL   Pharyngeal Phase intact   Swallowing Recommendations   Diet Consistency Recommendations regular diet;thin liquids (level 0)   Supervision Level for Intake patient independent   Mode of Delivery Recommendations bolus size, small;slow rate of intake   Swallowing Maneuver Recommendations alternate food and liquid intake   Recommended Feeding/Eating Techniques (Swallow Eval) maintain upright sitting position for eating   Medication Administration Recommendations, Swallowing (SLP) whole with liquid, one at a time PRN   Instrumental Assessment Recommendations instrumental evaluation not recommended at this time   Clinical Impression   Criteria for Skilled Therapeutic Interventions Met (SLP Eval) No problems identified which require skilled intervention   SLP Diagnosis clinically functional swallow   Clinical Impression Comments Pt currently presents with clinically functional swallow. No difficulty or s/s aspiration observed across PO trials.   SLP Total Evaluation Time   Eval: oral/pharyngeal swallow function, clinical swallow Minutes (31891) 01   SLP Discharge Planning   SLP Plan No follow-up for dysphagia; consider evaluation of cognitive-linguistic skills as indicated.   SLP Discharge Recommendation Acute Rehab Center-Motivated patient will benefit from intensive, interdisciplinary therapy.  Anticipate will  be able to tolerate 3 hours of therapy per day;home with outpatient therapy services   SLP Rationale for DC Rec No further dysphagia needs; possible need for cognitive-linguistic intervention and other therapies   SLP Brief overview of current status  Recommend continue regular diet with thin liquid and standard precautions. Consider cognitive-linguistic assessment as indicated; ?defer to next level of care pending plan for discharge.   SLP Time and Intention   Total Session Time (sum of timed and untimed services) 14

## 2024-12-11 ENCOUNTER — APPOINTMENT (OUTPATIENT)
Dept: PHYSICAL THERAPY | Facility: CLINIC | Age: 49
DRG: 065 | End: 2024-12-11
Payer: COMMERCIAL

## 2024-12-11 ENCOUNTER — ANESTHESIA (OUTPATIENT)
Dept: SURGERY | Facility: CLINIC | Age: 49
End: 2024-12-11
Payer: COMMERCIAL

## 2024-12-11 ENCOUNTER — APPOINTMENT (OUTPATIENT)
Dept: OCCUPATIONAL THERAPY | Facility: CLINIC | Age: 49
DRG: 065 | End: 2024-12-11
Attending: INTERNAL MEDICINE
Payer: COMMERCIAL

## 2024-12-11 ENCOUNTER — ANESTHESIA EVENT (OUTPATIENT)
Dept: SURGERY | Facility: CLINIC | Age: 49
End: 2024-12-11
Payer: COMMERCIAL

## 2024-12-11 ENCOUNTER — APPOINTMENT (OUTPATIENT)
Dept: CARDIOLOGY | Facility: CLINIC | Age: 49
DRG: 065 | End: 2024-12-11
Attending: PSYCHIATRY & NEUROLOGY
Payer: COMMERCIAL

## 2024-12-11 LAB
ALBUMIN UR-MCNC: 20 MG/DL
APPEARANCE UR: CLEAR
BILIRUB UR QL STRIP: NEGATIVE
COLOR UR AUTO: YELLOW
GLUCOSE UR STRIP-MCNC: NEGATIVE MG/DL
HGB UR QL STRIP: NEGATIVE
HYALINE CASTS: 3 /LPF
KETONES UR STRIP-MCNC: 40 MG/DL
LEUKOCYTE ESTERASE UR QL STRIP: NEGATIVE
LVEF ECHO: NORMAL
MUCOUS THREADS #/AREA URNS LPF: PRESENT /LPF
NITRATE UR QL: NEGATIVE
PH UR STRIP: 5.5 [PH] (ref 5–7)
RBC URINE: 0 /HPF
SP GR UR STRIP: 1.03 (ref 1–1.03)
SQUAMOUS EPITHELIAL: <1 /HPF
UROBILINOGEN UR STRIP-MCNC: NORMAL MG/DL
WBC URINE: 0 /HPF

## 2024-12-11 PROCEDURE — 93325 DOPPLER ECHO COLOR FLOW MAPG: CPT | Mod: 26 | Performed by: INTERNAL MEDICINE

## 2024-12-11 PROCEDURE — 97750 PHYSICAL PERFORMANCE TEST: CPT | Mod: GP | Performed by: PHYSICAL THERAPIST

## 2024-12-11 PROCEDURE — 99232 SBSQ HOSP IP/OBS MODERATE 35: CPT | Performed by: HOSPITALIST

## 2024-12-11 PROCEDURE — 250N000011 HC RX IP 250 OP 636: Performed by: NURSE ANESTHETIST, CERTIFIED REGISTERED

## 2024-12-11 PROCEDURE — 93320 DOPPLER ECHO COMPLETE: CPT | Mod: 26 | Performed by: INTERNAL MEDICINE

## 2024-12-11 PROCEDURE — 93312 ECHO TRANSESOPHAGEAL: CPT | Mod: 26 | Performed by: INTERNAL MEDICINE

## 2024-12-11 PROCEDURE — 370N000017 HC ANESTHESIA TECHNICAL FEE, PER MIN

## 2024-12-11 PROCEDURE — 258N000003 HC RX IP 258 OP 636: Performed by: NURSE ANESTHETIST, CERTIFIED REGISTERED

## 2024-12-11 PROCEDURE — 97112 NEUROMUSCULAR REEDUCATION: CPT | Mod: GO

## 2024-12-11 PROCEDURE — 250N000013 HC RX MED GY IP 250 OP 250 PS 637: Performed by: INTERNAL MEDICINE

## 2024-12-11 PROCEDURE — 97165 OT EVAL LOW COMPLEX 30 MIN: CPT | Mod: GO

## 2024-12-11 PROCEDURE — 97530 THERAPEUTIC ACTIVITIES: CPT | Mod: GO

## 2024-12-11 PROCEDURE — 81001 URINALYSIS AUTO W/SCOPE: CPT | Performed by: HOSPITALIST

## 2024-12-11 PROCEDURE — 97112 NEUROMUSCULAR REEDUCATION: CPT | Mod: GP | Performed by: PHYSICAL THERAPIST

## 2024-12-11 PROCEDURE — 120N000001 HC R&B MED SURG/OB

## 2024-12-11 PROCEDURE — 250N000009 HC RX 250: Performed by: NURSE ANESTHETIST, CERTIFIED REGISTERED

## 2024-12-11 PROCEDURE — 97116 GAIT TRAINING THERAPY: CPT | Mod: GP | Performed by: PHYSICAL THERAPIST

## 2024-12-11 PROCEDURE — 250N000009 HC RX 250: Performed by: INTERNAL MEDICINE

## 2024-12-11 PROCEDURE — 93325 DOPPLER ECHO COLOR FLOW MAPG: CPT

## 2024-12-11 RX ORDER — MAGNESIUM HYDROXIDE/ALUMINUM HYDROXICE/SIMETHICONE 120; 1200; 1200 MG/30ML; MG/30ML; MG/30ML
30 SUSPENSION ORAL EVERY 8 HOURS PRN
Status: ACTIVE | OUTPATIENT
Start: 2024-12-11 | End: 2024-12-13

## 2024-12-11 RX ORDER — LIDOCAINE HYDROCHLORIDE 20 MG/ML
INJECTION, SOLUTION INFILTRATION; PERINEURAL PRN
Status: DISCONTINUED | OUTPATIENT
Start: 2024-12-11 | End: 2024-12-11

## 2024-12-11 RX ORDER — NALOXONE HYDROCHLORIDE 0.4 MG/ML
0.4 INJECTION, SOLUTION INTRAMUSCULAR; INTRAVENOUS; SUBCUTANEOUS
Status: CANCELLED | OUTPATIENT
Start: 2024-12-11 | End: 2024-12-13

## 2024-12-11 RX ORDER — GLYCOPYRROLATE 0.2 MG/ML
0.1 INJECTION, SOLUTION INTRAMUSCULAR; INTRAVENOUS ONCE
Status: DISCONTINUED | OUTPATIENT
Start: 2024-12-11 | End: 2024-12-14 | Stop reason: HOSPADM

## 2024-12-11 RX ORDER — FLUMAZENIL 0.1 MG/ML
0.2 INJECTION, SOLUTION INTRAVENOUS
Status: CANCELLED | OUTPATIENT
Start: 2024-12-11 | End: 2024-12-13

## 2024-12-11 RX ORDER — LIDOCAINE 40 MG/G
CREAM TOPICAL
Status: DISCONTINUED | OUTPATIENT
Start: 2024-12-11 | End: 2024-12-14 | Stop reason: HOSPADM

## 2024-12-11 RX ORDER — FENTANYL CITRATE 50 UG/ML
50 INJECTION, SOLUTION INTRAMUSCULAR; INTRAVENOUS
Status: CANCELLED | OUTPATIENT
Start: 2024-12-11

## 2024-12-11 RX ORDER — FENTANYL CITRATE 50 UG/ML
INJECTION, SOLUTION INTRAMUSCULAR; INTRAVENOUS PRN
Status: DISCONTINUED | OUTPATIENT
Start: 2024-12-11 | End: 2024-12-11

## 2024-12-11 RX ORDER — PROPOFOL 10 MG/ML
INJECTION, EMULSION INTRAVENOUS PRN
Status: DISCONTINUED | OUTPATIENT
Start: 2024-12-11 | End: 2024-12-11

## 2024-12-11 RX ORDER — BENZOCAINE/MENTHOL 6 MG-10 MG
1 LOZENGE MUCOUS MEMBRANE 3 TIMES DAILY PRN
Status: ACTIVE | OUTPATIENT
Start: 2024-12-11 | End: 2024-12-13

## 2024-12-11 RX ORDER — FENTANYL CITRATE 50 UG/ML
25 INJECTION, SOLUTION INTRAMUSCULAR; INTRAVENOUS
Status: CANCELLED | OUTPATIENT
Start: 2024-12-11

## 2024-12-11 RX ORDER — LIDOCAINE 50 MG/G
OINTMENT TOPICAL ONCE
Status: CANCELLED | OUTPATIENT
Start: 2024-12-11 | End: 2024-12-11

## 2024-12-11 RX ORDER — NALOXONE HYDROCHLORIDE 0.4 MG/ML
0.2 INJECTION, SOLUTION INTRAMUSCULAR; INTRAVENOUS; SUBCUTANEOUS
Status: CANCELLED | OUTPATIENT
Start: 2024-12-11 | End: 2024-12-13

## 2024-12-11 RX ORDER — DEXTROSE MONOHYDRATE 25 G/50ML
9.5 INJECTION, SOLUTION INTRAVENOUS
Status: DISCONTINUED | OUTPATIENT
Start: 2024-12-11 | End: 2024-12-14 | Stop reason: HOSPADM

## 2024-12-11 RX ORDER — NALOXONE HYDROCHLORIDE 0.4 MG/ML
0.2 INJECTION, SOLUTION INTRAMUSCULAR; INTRAVENOUS; SUBCUTANEOUS
Status: ACTIVE | OUTPATIENT
Start: 2024-12-11 | End: 2024-12-13

## 2024-12-11 RX ORDER — LIDOCAINE HYDROCHLORIDE 40 MG/ML
1.5 SOLUTION TOPICAL ONCE
Status: CANCELLED | OUTPATIENT
Start: 2024-12-11

## 2024-12-11 RX ORDER — ACETAMINOPHEN 325 MG/1
650 TABLET ORAL EVERY 4 HOURS PRN
Status: DISCONTINUED | OUTPATIENT
Start: 2024-12-11 | End: 2024-12-11

## 2024-12-11 RX ORDER — ONDANSETRON 2 MG/ML
INJECTION INTRAMUSCULAR; INTRAVENOUS PRN
Status: DISCONTINUED | OUTPATIENT
Start: 2024-12-11 | End: 2024-12-11

## 2024-12-11 RX ORDER — SODIUM CHLORIDE, SODIUM LACTATE, POTASSIUM CHLORIDE, CALCIUM CHLORIDE 600; 310; 30; 20 MG/100ML; MG/100ML; MG/100ML; MG/100ML
INJECTION, SOLUTION INTRAVENOUS CONTINUOUS PRN
Status: DISCONTINUED | OUTPATIENT
Start: 2024-12-11 | End: 2024-12-11

## 2024-12-11 RX ADMIN — TOPICAL ANESTHETIC 0.5 ML: 200 SPRAY DENTAL; PERIODONTAL at 13:20

## 2024-12-11 RX ADMIN — PROPOFOL 40 MG: 10 INJECTION, EMULSION INTRAVENOUS at 13:44

## 2024-12-11 RX ADMIN — PROPOFOL 150 MCG/KG/MIN: 10 INJECTION, EMULSION INTRAVENOUS at 13:46

## 2024-12-11 RX ADMIN — MIDAZOLAM 2 MG: 1 INJECTION INTRAMUSCULAR; INTRAVENOUS at 13:39

## 2024-12-11 RX ADMIN — LIDOCAINE HYDROCHLORIDE 50 MG: 20 INJECTION, SOLUTION INFILTRATION; PERINEURAL at 13:40

## 2024-12-11 RX ADMIN — FENTANYL CITRATE 25 MCG: 50 INJECTION INTRAMUSCULAR; INTRAVENOUS at 13:43

## 2024-12-11 RX ADMIN — ASPIRIN 81 MG CHEWABLE TABLET 324 MG: 81 TABLET CHEWABLE at 08:18

## 2024-12-11 RX ADMIN — ATORVASTATIN CALCIUM 40 MG: 40 TABLET, FILM COATED ORAL at 20:43

## 2024-12-11 RX ADMIN — FENTANYL CITRATE 25 MCG: 50 INJECTION INTRAMUSCULAR; INTRAVENOUS at 13:48

## 2024-12-11 RX ADMIN — PROPOFOL 50 MG: 10 INJECTION, EMULSION INTRAVENOUS at 13:41

## 2024-12-11 RX ADMIN — SODIUM CHLORIDE, POTASSIUM CHLORIDE, SODIUM LACTATE AND CALCIUM CHLORIDE: 600; 310; 30; 20 INJECTION, SOLUTION INTRAVENOUS at 13:38

## 2024-12-11 RX ADMIN — SENNOSIDES AND DOCUSATE SODIUM 1 TABLET: 50; 8.6 TABLET ORAL at 08:18

## 2024-12-11 RX ADMIN — ONDANSETRON 4 MG: 2 INJECTION INTRAMUSCULAR; INTRAVENOUS at 13:46

## 2024-12-11 ASSESSMENT — ACTIVITIES OF DAILY LIVING (ADL)
ADLS_ACUITY_SCORE: 25
ADLS_ACUITY_SCORE: 24
ADLS_ACUITY_SCORE: 25
ADLS_ACUITY_SCORE: 24
ADLS_ACUITY_SCORE: 29
ADLS_ACUITY_SCORE: 24
ADLS_ACUITY_SCORE: 25
ADLS_ACUITY_SCORE: 24
ADLS_ACUITY_SCORE: 24

## 2024-12-11 ASSESSMENT — LIFESTYLE VARIABLES: TOBACCO_USE: 1

## 2024-12-11 NOTE — PRE-PROCEDURE
GENERAL PRE-PROCEDURE:   Procedure:  Transesophageal echocardiogram  Date/Time:  12/11/2024 1:42 PM    Written consent obtained?: Yes    Risks and benefits: Risks, benefits and alternatives were discussed    Consent given by:  Patient  Patient states understanding of procedure being performed: Yes    Patient's understanding of procedure matches consent: Yes    Procedure consent matches procedure scheduled: Yes    Expected level of sedation:  Deep  Appropriately NPO:  Yes  ASA Class:  2  Mallampati  :  Grade 2- soft palate, base of uvula, tonsillar pillars, and portion of posterior pharyngeal wall visible  Lungs:  Lungs clear with good breath sounds bilaterally  Heart:  Normal heart sounds and rate  History & Physical reviewed:  History and physical reviewed and no updates needed  Statement of review:  I have reviewed the lab findings, diagnostic data, medications, and the plan for sedation

## 2024-12-11 NOTE — ANESTHESIA CARE TRANSFER NOTE
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    Anesthesia Type:   MAC     Note:    Oropharynx: oropharynx clear of all foreign objects and spontaneously breathing  Level of Consciousness: drowsy  Oxygen Supplementation: face mask  Level of Supplemental Oxygen (L/min / FiO2): 8  Independent Airway: airway patency satisfactory and stable  Dentition: dentition unchanged  Vital Signs Stable: post-procedure vital signs reviewed and stable  Report to RN Given: handoff report given  Patient transferred to: PACU (MARIA TERESA [place])    Handoff Report: Identifed the Patient, Identified the Reponsible Provider, Reviewed the pertinent medical history, Discussed the surgical course, Reviewed Intra-OP anesthesia mangement and issues during anesthesia, Set expectations for post-procedure period and Allowed opportunity for questions and acknowledgement of understanding      Vitals:  Vitals Value Taken Time   /79    Temp     Pulse 92    Resp 12    SpO2 98%        Electronically Signed By: HOWARD Carias CRNA  December 11, 2024  2:04 PM

## 2024-12-11 NOTE — PROGRESS NOTES
12/11/24 0842   Appointment Info   Signing Clinician's Name / Credentials (OT) Natividad Roberts, OTR/L   Living Environment   People in Home significant other;parent(s)  (mother)   Current Living Arrangements other (see comments)  (duplex)   Home Accessibility stairs to enter home   Number of Stairs, Main Entrance greater than 10 stairs   Stair Railings, Main Entrance railings safe and in good condition   Transportation Anticipated family or friend will provide   Living Environment Comments Has walk in and tub/shower (walk-in in basement). Both SO and mother work.   Self-Care   Usual Activity Tolerance good   Current Activity Tolerance moderate   Equipment Currently Used at Home none   Fall history within last six months no   Activity/Exercise/Self-Care Comment fully ind with ADLs at baseline   Instrumental Activities of Daily Living (IADL)   IADL Comments fully ind with IADLS, has a semi-active job   General Information   Onset of Illness/Injury or Date of Surgery 12/09/24   Referring Physician Luis David MD   Patient/Family Therapy Goal Statement (OT) receptive to rehab   Additional Occupational Profile Info/Pertinent History of Current Problem 49 year old male with reported history of stroke who presented  to the ER for 2 to 3 days of loss of equilibrium in the setting of nausea, vomiting, diarrhea at onset of symptoms. Imaging revals: MRI w/ large acute vs subacute R cerebellar CVA w/ mild edema, no hemorrhagic transformation. 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 PMHmultiple prior embolic strokes of undetermined source, HLD, alcohol use.   Existing Precautions/Restrictions fall   Left Upper Extremity (Weight-bearing Status) full weight-bearing (FWB)   Right Upper Extremity (Weight-bearing Status) full weight-bearing (FWB)   Left Lower Extremity (Weight-bearing Status) full weight-bearing (FWB)   Right Lower Extremity (Weight-bearing Status) full weight-bearing (FWB)   General  Observations and Info R UE dominant   Cognitive Status Examination   Orientation Status orientation to person, place and time   Cognitive Status Comments suspected cog deficits -difficulty stating months in reverse order, 1x error counting backwards from 20-0 by 2s. Slurred speech.   Visual Perception   Impact of Vision Impairment on Function (Vision) L visual cut   Sensory   Sensory Quick Adds sensation intact   Pain Assessment   Patient Currently in Pain No   Posture   Posture forward head position   Range of Motion Comprehensive   Comment, General Range of Motion B UE WNL   Strength Comprehensive (MMT)   General Manual Muscle Testing (MMT) Assessment no strength deficits identified   Comment, General Manual Muscle Testing (MMT) Assessment B UE WNL, symmetrical   Muscle Tone Assessment   Muscle Tone Quick Adds No deficits were identified   Coordination   Coordination Comments R UE ataxia   Bed Mobility   Comment (Bed Mobility) SBA/mod I   Transfers   Transfer Comments STS SBA/FWW   Balance   Balance Comments Balance deficits noted, CGA-min A x 1/FWW for ambulation   Activities of Daily Living   BADL Assessment/Intervention bathing;upper body dressing;lower body dressing;grooming;toileting   Bathing Assessment/Intervention   [place] Level (Bathing) contact guard assist;modified independence   Comment, (Bathing) per clinical judgment   Assistive Devices (Bathing) shower chair   Upper Body Dressing Assessment/Training   Comment, (Upper Body Dressing) per clinical judgment   [place] Level (Upper Body Dressing) modified independence;contact guard assist   Lower Body Dressing Assessment/Training   Comment, (Lower Body Dressing) per clinical judgment   [place] Level (Lower Body Dressing) modified independence;contact guard assist   Grooming Assessment/Training   [place] Level (Grooming) modified independence  (SBA)   Toileting   [place] Level (Toileting) modified independence  (CGA)   Clinical  Impression   Criteria for Skilled Therapeutic Interventions Met (OT) Yes, treatment indicated   OT Diagnosis Dec I/ADL ind   OT Problem List-Impairments impacting ADL problems related to;balance;cognition;coordination;vision;mobility   Assessment of Occupational Performance 5 or more Performance Deficits   Identified Performance Deficits standing g/h, toileting, bathing, dressing, func amb, IADLs (work, household, driving)   Planned Therapy Interventions (OT) ADL retraining;IADL retraining;balance training;cognition;transfer training;progressive activity/exercise;risk factor education;neuromuscular re-education;motor coordination training   Clinical Decision Making Complexity (OT) problem focused assessment/low complexity   Risk & Benefits of therapy have been explained evaluation/treatment results reviewed;care plan/treatment goals reviewed;risks/benefits reviewed;current/potential barriers reviewed;participants voiced agreement with care plan;participants included;patient   Clinical Impression Comments Pt will benefit from skilled IP OT to progress safety and ind with I/ADLs and to rec safe discharge.   OT Total Evaluation Time   OT Eval, Low Complexity Minutes (39201) 8   OT Goals   Therapy Frequency (OT) Daily   OT Predicted Duration/Target Date for Goal Attainment 12/25/24   OT Goals Hygiene/Grooming;Upper Body Dressing;Lower Body Dressing;Toilet Transfer/Toileting;Cognition;Home Management;OT Goal 1;OT Goal 2;Transfers   OT: Hygiene/Grooming independent   OT: Upper Body Dressing Independent;including set-up/clothing retrieval   OT: Lower Body Dressing Independent;including set-up/clothing retrieval   OT: Transfer Modified independent  (tub/shower tx)   OT: Toilet Transfer/Toileting Independent;cleaning and garment management   OT: Home Management Independent;with light demand household tasks;ambulatory level   OT: Cognitive Patient/caregiver will verbalize understanding of cognitive assessment  "results/recommendations as needed for safe discharge planning   OT: Goal 1 Pt will demonstrate ind with R UE NMRE HEP   OT: Goal 2 Pt will verbalize agreement to AE/DME recs for inc safety with ADLs.   Interventions   Interventions Quick Adds Self-Care/Home Management;Therapeutic Activity;Therapeutic Procedures/Exercise;Neuromuscular Re-education   Self-Care/Home Management   Self-Care/Home Mgmt/ADL, Compensatory, Meal Prep Minutes (82107) 5   Treatment Detail/Skilled Intervention Standing g/h completed at sink. Edu to use walker at sink, however, pt neglecting walker. Use of vanity for support and SBA provided. OT provided edu on using R UE for everyday ADLs for neuromuscular retraining purposes. Pt verbalized agreement. Pt functional with task using R UE, reported some difficulty. Of note, pt had difficulty with higher level cog based tasks. Pt stated, \"I'm usually not very smart\".   Neuromuscular Re-Education   Neuromuscular Re-Education Minutes (91448) 8   Treatment Detail/Skilled Intervention OT provided edu on coordination activities for R UE ataxia including finger/thumb opposition, accuracy with closing eyes and touching body parts. Issued theraputty and foam block with edu on various coordination-based activites to complete. Pt verbalized understanding.   Therapeutic Activities   Therapeutic Activity Minutes (20764) 10   Treatment Detail/Skilled Intervention Pt greeted for OT eval/treat, edu on role and pt agreeable. Bed mobility SBA/mod I. STS SBA/FWW. Pt tolerated approx 300 ft func ambulation with CGA/FWW, occasionally min A x 1/FWW to correct 2x loss of balance (walker tilting, high fall risk). Pt noted to ambulate quickly, cues to slow pace. Cued to stop at window and engage in functional balance activities, CGA provided. Pt agreeable to sit in chair. Alarm on and edu on ARU rec. Pt in agreement.   OT Discharge Planning   OT Plan further assess L visual field cut. Standing balance activities that " incorporate R UE coordination (Connect 4?). Assess cog and screen as appropriate.   OT Discharge Recommendation (DC Rec) [facility] patient will benefit from intensive, interdisciplinary therapy.  Anticipate will be able to tolerate 3 hours of therapy per day   OT Rationale for DC Rec Pt is typically ind and works a semi-active job at baseline. Ambulating with CGA-min A x 1/FWW (balance deficits noted, high fall risk). Also presenting with R UE ataxia and probable cog deficits, impacting I/ADL ind and safety. Rec ARU to return to [facility]. Pt motivated and good family support.   OT Brief overview of current status SBA-min A x 1/FWW. Goals of therapy will be to address safe mobility and make recs for d/c to next level of care. Pt and RN will continue to follow all falls risk precautions as documented by RN staff while hospitalized.   OT Equipment Needed at Discharge   (if returns home, rec FWW and shower chair)   Total Session Time   Timed Code Treatment Minutes 23   Total Session Time (sum of timed and untimed services) 31

## 2024-12-11 NOTE — PHARMACY
Pharmacy Consult to evaluate for medication related stroke core measures    Mundo Kern, 49 year old male admitted for loss of equilibrium, n/v, dairrhea on 12/9/2024.    Thrombolytic was not given because of Time from onset contraindications    VTE Prophylaxis SCDs /PCDs placed on 12/10, as appropriate prior to end of hospital day 2.    Antithrombotic: aspirin started on 12/10, as appropriate by end of hospital day 2. Continue antithrombotic therapy on discharge to meet quality measures, unless contraindicated.    Anticoagulation if history of A-fib/flutter: Patient does not have history of A-fib/flutter - anticoagulation not required for medication related stroke core measures.     LDL Cholesterol Calculated   Date Value Ref Range Status   12/09/2024 145 (H) <100 mg/dL Final       Patient currently receiving Lipitor (atorvastatin) continue statin on discharge to meet quality measures, unless contraindicated.    Recommendations: None at this time    Thank you for the consult.    Aislinn Payne [facility] 12/11/2024 11:51 AM

## 2024-12-11 NOTE — ANESTHESIA PREPROCEDURE EVALUATION
Prior to Admission medications    Not on File     Current Facility-Administered Medications   Medication Dose Route Frequency Provider Last Rate Last Admin    acetaminophen (TYLENOL) tablet 650 mg  650 mg Oral Q4H PRN Luis David MD        Or    acetaminophen (TYLENOL) oral liquid 650 mg  650 mg Oral Q4H PRN Luis David MD        Or    acetaminophen (TYLENOL) Suppository 650 mg  650 mg Rectal Q4H PRN Luis David MD        aspirin (ASA) EC tablet 325 mg  325 mg Oral Daily Luis David MD        Or    aspirin (ASA) chewable tablet 324 mg  324 mg Oral or NG Tube Daily Luis David MD   324 mg at 12/11/24 0818    Or    aspirin (ASA) Suppository 300 mg  300 mg Rectal Daily Luis David MD        atorvastatin (LIPITOR) tablet 40 mg  40 mg Oral or Feeding Tube QPM Luis David MD   40 mg at 12/10/24 1939    benzocaine 20% (HURRICAINE/TOPEX) 20 % spray 0.5-2 mL  1-4 spray Mouth/Throat Once Luis David MD        sodium chloride (PF) 0.9% PF flush 9.5 mL  9.5 mL Intracatheter q1 min prn Luis David MD        Or    dextrose 50 % injection 9.5 mL  9.5 mL Intravenous q1 min prn Luis David MD        glycopyrrolate (ROBINUL) injection 0.1 mg  0.1 mg Intravenous Once Luis David MD        lidocaine (LMX4) cream   Topical Q1H PRN Brice Rahman MD        lidocaine 1 % 1 mL  1 mL Other Q1H PRN Brice Rahman MD        magnesium hydroxide (MILK OF MAGNESIA) suspension 30 mL  30 mL Oral or NG Tube Daily PRN Luis David MD        May take oral meds with a sip of water, the morning of MARIA TERESA procedure.   Does not apply Continuous PRN Brice Rahman MD        naloxone (NARCAN) injection 0.2 mg  0.2 mg Intravenous Q2 Min PRN Luis David MD        ondansetron (ZOFRAN ODT) ODT tab 4 mg  4 mg Oral Q6H PRN Luis David MD        Or  "   ondansetron (ZOFRAN) injection 4 mg  4 mg Intravenous Q6H PRN Luis David MD        senna-docusate (SENOKOT-S/PERICOLACE) 8.6-50 MG per tablet 1-2 tablet  1-2 tablet Oral or NG Tube BID Luis David MD   1 tablet at 12/11/24 0818    sodium chloride (PF) 0.9% PF flush 3 mL  3 mL Intravenous Q1H PRN Brice Rahman MD        sodium chloride (PF) 0.9% PF flush 3 mL  3 mL Intravenous Q8H Brice Rahman MD        sodium chloride (PF) 0.9% PF flush 3 mL  3 mL Intracatheter Q8H Luis David MD   3 mL at 12/11/24 0818    sodium chloride (PF) 0.9% PF flush 3 mL  3 mL Intracatheter q1 min prn Luis David MD         Current Facility-Administered Medications   Medication Dose Route Frequency Provider Last Rate Last Admin    May take oral meds with a sip of water, the morning of MARIA TERESA procedure.   Does not apply Continuous PRN Brice Rahman MD         No results for input(s): \"ABO\", \"RH\" in the last 86020 hours.  No results for input(s): \"HCG\" in the last 17181 hours.  Recent Results (from the past 744 hours)   MR Brain w/o & w Contrast    Narrative    EXAM: MR BRAIN W/O and W CONTRAST, MRA NECK (CAROTIDS) W/O and W CONTRAST, MRA BRAIN ([place]) W/O CONTRAST  LOCATION: [facility]  DATE/TIME: 12/9/2024 10:20 PM CST    INDICATION: dizziness, dysmetria RUE, h o stroke  COMPARISON: None.  CONTRAST: 10mL Gadavist   TECHNIQUE:   1) Routine multiplanar multisequence head MRI with and without intravenous contrast.  2) 3D time-of-flight head MRA without intravenous contrast.  3) Neck MRA without and with IV contrast. Stenosis measurements made according to NASCET criteria unless otherwise specified.    FINDINGS:  HEAD MRI:  INTRACRANIAL CONTENTS: Large acute versus subacute right paramedian cerebellar hemisphere infarct with mild associated mass effect. No hemorrhagic transformation. Right PCA territory " encephalomalacia with cortical laminar necrosis from prior infarct.   Additional small region of encephalomalacia in the right frontal operculum, also likely secondary to remote infarct. No mass, acute hemorrhage, or extra-axial fluid collections. Normal brain parenchymal signal. Normal ventricles and sulci. Normal   Position of the cerebellar tonsils. No pathologic contrast enhancement.    SELLA: No abnormality accounting for technique.    OSSEOUS STRUCTURES/SOFT TISSUES: Normal marrow signal.     ORBITS: No abnormality accounting for technique.     SINUSES/MASTOIDS: No paranasal sinus mucosal disease. No middle ear or mastoid effusion.     HEAD MRA:   ANTERIOR CIRCULATION: No stenosis/occlusion, aneurysm, or high flow vascular malformation. Standard [place] anatomy.    POSTERIOR CIRCULATION: No stenosis/occlusion of the major arteries, aneurysm, or high flow vascular malformation. The right superior cerebellar artery is likely occluded and the right AICA is not visualized. Balanced vertebral arteries supply a normal   basilar artery.     NECK MRA:   RIGHT CAROTID: No measurable stenosis or dissection.    LEFT CAROTID: No measurable stenosis or dissection.    VERTEBRAL ARTERIES: No focal stenosis or dissection. Balanced vertebral arteries.    AORTIC ARCH: Classic aortic arch anatomy with no significant stenosis at the origin of the great vessels.      Impression    IMPRESSION:  HEAD MRI:   1.  Large acute versus subacute right cerebellar hemisphere infarct with mild associated edema. No significant posterior fossa mass effect. No hemorrhagic transformation.   2.  Remote right PCA territory infarct and likely small right frontal operculum infarct.    HEAD MRA:   1.  No large vessel occlusion or hemodynamically significant stenosis.  2.  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     NECK MRA:  1.  Normal neck MRA.    Findings discussed with Dr. Agarwal  12/9/2024 10:40 PM CST   MRA Angiogram Head w/o Contrast    Narrative    EXAM: MR BRAIN W/O and W CONTRAST, MRA NECK (CAROTIDS) W/O and W CONTRAST, MRA BRAIN ([place]) W/O CONTRAST  LOCATION: [facility]  DATE/TIME: 12/9/2024 10:20 PM CST    INDICATION: dizziness, dysmetria RUE, h o stroke  COMPARISON: None.  CONTRAST: 10mL Gadavist   TECHNIQUE:   1) Routine multiplanar multisequence head MRI with and without intravenous contrast.  2) 3D time-of-flight head MRA without intravenous contrast.  3) Neck MRA without and with IV contrast. Stenosis measurements made according to NASCET criteria unless otherwise specified.    FINDINGS:  HEAD MRI:  INTRACRANIAL CONTENTS: Large acute versus subacute right paramedian cerebellar hemisphere infarct with mild associated mass effect. No hemorrhagic transformation. Right PCA territory encephalomalacia with cortical laminar necrosis from prior infarct.   Additional small region of encephalomalacia in the right frontal operculum, also likely secondary to remote infarct. No mass, acute hemorrhage, or extra-axial fluid collections. Normal brain parenchymal signal. Normal ventricles and sulci. Normal   Position of the cerebellar tonsils. No pathologic contrast enhancement.    SELLA: No abnormality accounting for technique.    OSSEOUS STRUCTURES/SOFT TISSUES: Normal marrow signal.     ORBITS: No abnormality accounting for technique.     SINUSES/MASTOIDS: No paranasal sinus mucosal disease. No middle ear or mastoid effusion.     HEAD MRA:   ANTERIOR CIRCULATION: No stenosis/occlusion, aneurysm, or high flow vascular malformation. Standard [place] anatomy.    POSTERIOR CIRCULATION: No stenosis/occlusion of the major arteries, aneurysm, or high flow vascular malformation. The right superior cerebellar artery is likely occluded and the right AICA is not visualized. Balanced vertebral arteries supply a normal   basilar artery.     NECK MRA:   RIGHT  CAROTID: No measurable stenosis or dissection.    LEFT CAROTID: No measurable stenosis or dissection.    VERTEBRAL ARTERIES: No focal stenosis or dissection. Balanced vertebral arteries.    AORTIC ARCH: Classic aortic arch anatomy with no significant stenosis at the origin of the great vessels.      Impression    IMPRESSION:  HEAD MRI:   1.  Large acute versus subacute right cerebellar hemisphere infarct with mild associated edema. No significant posterior fossa mass effect. No hemorrhagic transformation.   2.  Remote right PCA territory infarct and likely small right frontal operculum infarct.    HEAD MRA:   1.  No large vessel occlusion or hemodynamically significant stenosis.  2.  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     NECK MRA:  1.  Normal neck MRA.    Findings discussed with Dr. Agarwal 12/9/2024 10:40 PM CST   MRA Angiogram Neck w/o & w Contrast    Narrative    EXAM: MR BRAIN W/O and W CONTRAST, MRA NECK (CAROTIDS) W/O and W CONTRAST, MRA BRAIN ([place]) W/O CONTRAST  LOCATION: [facility]  DATE/TIME: 12/9/2024 10:20 PM CST    INDICATION: dizziness, dysmetria RUE, h o stroke  COMPARISON: None.  CONTRAST: 10mL Gadavist   TECHNIQUE:   1) Routine multiplanar multisequence head MRI with and without intravenous contrast.  2) 3D time-of-flight head MRA without intravenous contrast.  3) Neck MRA without and with IV contrast. Stenosis measurements made according to NASCET criteria unless otherwise specified.    FINDINGS:  HEAD MRI:  INTRACRANIAL CONTENTS: Large acute versus subacute right paramedian cerebellar hemisphere infarct with mild associated mass effect. No hemorrhagic transformation. Right PCA territory encephalomalacia with cortical laminar necrosis from prior infarct.   Additional small region of encephalomalacia in the right frontal operculum, also likely secondary to remote infarct. No mass, acute hemorrhage,  or extra-axial fluid collections. Normal brain parenchymal signal. Normal ventricles and sulci. Normal   Position of the cerebellar tonsils. No pathologic contrast enhancement.    SELLA: No abnormality accounting for technique.    OSSEOUS STRUCTURES/SOFT TISSUES: Normal marrow signal.     ORBITS: No abnormality accounting for technique.     SINUSES/MASTOIDS: No paranasal sinus mucosal disease. No middle ear or mastoid effusion.     HEAD MRA:   ANTERIOR CIRCULATION: No stenosis/occlusion, aneurysm, or high flow vascular malformation. Standard [place] anatomy.    POSTERIOR CIRCULATION: No stenosis/occlusion of the major arteries, aneurysm, or high flow vascular malformation. The right superior cerebellar artery is likely occluded and the right AICA is not visualized. Balanced vertebral arteries supply a normal   basilar artery.     NECK MRA:   RIGHT CAROTID: No measurable stenosis or dissection.    LEFT CAROTID: No measurable stenosis or dissection.    VERTEBRAL ARTERIES: No focal stenosis or dissection. Balanced vertebral arteries.    AORTIC ARCH: Classic aortic arch anatomy with no significant stenosis at the origin of the great vessels.      Impression    IMPRESSION:  HEAD MRI:   1.  Large acute versus subacute right cerebellar hemisphere infarct with mild associated edema. No significant posterior fossa mass effect. No hemorrhagic transformation.   2.  Remote right PCA territory infarct and likely small right frontal operculum infarct.    HEAD MRA:   1.  No large vessel occlusion or hemodynamically significant stenosis.  2.  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     NECK MRA:  1.  Normal neck MRA.    Findings discussed with Dr. Agarwal 12/9/2024 10:40 PM CST   CT Chest/Abdomen/Pelvis w Contrast    Narrative    CT CHEST/ABDOMEN/PELVIS W CONTRAST 12/10/2024 12:36 PM    CLINICAL HISTORY: recurrent stroke r/o underlying malignancy    TECHNIQUE: CT  scan of the chest, abdomen, and pelvis was performed  without IV contrast. Multiplanar reformats were obtained. Dose  reduction techniques were used.   CONTRAST: None.    COMPARISON: CT abdomen and pelvis performed on 7/19/2005    FINDINGS:   LUNGS AND PLEURA: No pleural effusion or pneumothorax is present.  Subsegmental atelectasis is seen in the lung bases. Calcified  granulomas present in the right middle lobe. Punctate calcified  granuloma is noted within the left lower lobe.    MEDIASTINUM/AXILLAE: No lymphadenopathy. No thoracic aortic aneurysms.    CORONARY ARTERY CALCIFICATION: Mild.    HEPATOBILIARY: No significant mass or bile duct dilatation. No  calcified gallstones.     PANCREAS: No significant mass, duct dilatation, or inflammatory  change.    SPLEEN: Normal size.    ADRENAL GLANDS: No significant nodules.    KIDNEYS/BLADDER: No hydronephrosis is present. Wedge-shaped area of  hypoattenuation is seen along the inferior pole of the right kidney  (series 2, image 74). Similar appearing focus is also seen along the  inferior pole of the left kidney (series 6, image 58).    BOWEL: Diverticulosis in the colon. No acute inflammatory change. No  obstruction.     LYMPH NODES: No lymphadenopathy.    VASCULATURE: No abdominal aortic aneurysm.    PELVIC ORGANS: No pelvic masses.    OTHER: None.    MUSCULOSKELETAL: Mild degenerative changes are seen in the spine.  Sclerotic changes are seen along the superior and inferior endplates  of T8.      Impression    IMPRESSION:  1.  Wedge-shaped areas of hypoattenuation are noted along the inferior  poles of both kidneys. Findings can be seen in pyelonephritis although  no significant surrounding perinephric stranding is evident. Recommend  correlation with urinalysis. Renal infarction is also in the  differential diagnosis.    HE GROVE MD         SYSTEM ID:  [id number]    Anesthesia Pre-Procedure Evaluation    Patient: Mundo Kern   MRN: [id number]  : 1975        Procedure : Procedure(s):  Transesophageal echocardiogram intraoperative          Past Medical History:   Diagnosis Date    Cerebrovascular accident (H)     Tobacco use disorder       No past surgical history on file.   No Known Allergies   Social History     Tobacco Use    Smoking status: Every Day     Current packs/day: 1.00     Types: Cigarettes    Smokeless tobacco: Not on file   Substance Use Topics    Alcohol use: Yes     Comment: 6 pack of beer 3-4x per week      Wt Readings from Last 1 Encounters:   24 81.6 kg (180 lb)        Anesthesia Evaluation   Pt has had prior anesthetic.     No history of anesthetic complications       ROS/MED HX  ENT/Pulmonary:     (+)                tobacco use, Current use,                       Neurologic:     (+)          CVA,    TIA,                  Cardiovascular:       METS/Exercise Tolerance:     Hematologic:       Musculoskeletal:       GI/Hepatic:       Renal/Genitourinary:       Endo:    (-) Type II DM   Psychiatric/Substance Use:       Infectious Disease:       Malignancy:       Other:            Physical Exam    Airway        Mallampati: I    Neck ROM: full     Respiratory Devices and Support         Dental       (+) Minor Abnormalities - some fillings, tiny chips      Cardiovascular   cardiovascular exam normal          Pulmonary   pulmonary exam normal                OUTSIDE LABS:  CBC:   Lab Results   Component Value Date    WBC 15.3 (H) 12/10/2024    WBC 13.1 (H) 2024    HGB 16.6 12/10/2024    HGB 17.6 2024    HCT 46.0 12/10/2024    HCT 49.9 2024     12/10/2024     2024     BMP:   Lab Results   Component Value Date     12/10/2024     2024    POTASSIUM 3.4 12/10/2024    POTASSIUM 3.9 2024    CHLORIDE 104 12/10/2024    CHLORIDE 103 2024    CO2 21 (L) 12/10/2024    CO2 21 (L) 2024    BUN 11.0 12/10/2024    BUN 10.7 2024    CR 1.02 12/10/2024    CR 0.97 2024  "    (H) 12/10/2024    GLC 97 12/10/2024     COAGS:   Lab Results   Component Value Date    PTT 26 07/22/2023    INR 1.05 07/23/2023     POC: No results found for: \"BGM\", \"HCG\", \"HCGS\"  HEPATIC:   Lab Results   Component Value Date    ALBUMIN 4.4 12/09/2024    PROTTOTAL 7.0 12/09/2024    ALT 24 12/09/2024    AST 32 12/09/2024    ALKPHOS 88 12/09/2024    BILITOTAL 1.0 12/09/2024     OTHER:   Lab Results   Component Value Date    A1C 5.3 12/09/2024    SOFI 8.9 12/10/2024    TSH 2.32 07/22/2023       Anesthesia Plan    ASA Status:  2    NPO Status:  NPO Appropriate    Anesthesia Type: MAC.     - Reason for MAC: immobility needed, straight local not clinically adequate   Induction: Propofol.           Consents    Anesthesia Plan(s) and associated risks, benefits, and realistic alternatives discussed. Questions answered and patient/representative(s) expressed understanding.     - Discussed:     - Discussed with:  Patient            Postoperative Care       PONV prophylaxis: Ondansetron (or other 5HT-3)     Comments:    Other Comments: MARIA TERESA for clot marycruzal           Miguel Angel Spears MD    I have reviewed the pertinent notes and labs in the chart from the past 30 days and (re)examined the patient.  Any updates or changes from those notes are reflected in this note.                          # Overweight: Estimated body mass index is 25.1 kg/m  as calculated from the following:    Height as of this encounter: 1.803 m (5' 11\").    Weight as of this encounter: 81.6 kg (180 lb)., PRESENT ON ADMISSION     # Financial/Environmental Concerns: none        "

## 2024-12-11 NOTE — PROGRESS NOTES
Functional Gait Assessment (FGA): The FGA assesses postural stability during various walking tasks.   Gait assistive device used: Front Wheeled Walker    Scores of <22 /30 have been correlated with predicting falls in community-dwelling older adults according to Blanca & Rl 2010.   Scores of <18 /30 have been correlated with increased risk for falls in patients with Parkinsons Disease according to Amrit Berry Zhou et al 2014.  Minimal Detectable Change for patients with acute/chronic stroke = 4.2 according to Glenis & Manjit 2009  Minimal Detectable Change for patients with vestibular disorder = 8 according to Blanca & Rl 2010    Assessment (rationale for performing, application to patient s function & care plan): Care planning and plan for current balance deficit-recommending continued use of walker at this time. Pt in agreement to use the walker.    (Minutes billed as physical performance test):    12/11/24 1000   Signing Clinician's Name / Credentials   Signing clinician's name / credentials Kim Hargrove Poster, [facility]   Functional Gait Assessment (MERCY Rios., Pamela, GAshly F., et al. (2004))   1. GAIT LEVEL SURFACE 3   2. CHANGE IN GAIT SPEED 3   3. GAIT WITH HORIZONTAL HEAD TURNS 2   4. GAIT WITH VERTICAL HEAD TURNS 3   5. GAIT AND PIVOT TURN 3   6. STEP OVER OBSTACLE 1   7. GAIT WITH NARROW BASE OF SUPPORT 1   8. GAIT WITH EYES CLOSED 2   9. AMBULATING BACKWARDS 2   10. STEPS 2   Total Functional Gait Assessment Score   TOTAL SCORE: (MAXIMUM SCORE 30) 22

## 2024-12-11 NOTE — PROGRESS NOTES
[facility]    Hospitalist Progress Note    Interval History   On going issues with coordination. Denies nausea this am.  No cp/sob.  No irritative voiding sxs.    Assessment & Plan   Summary: Mundo Kern is a 49 year old male with PMH multiple prior embolic strokes of undetermined source, HLD, alcohol use, who was admitted on 12/9/2024 with a cerebellar stroke.    Cerebellar CVA, likely ESUS  Hx CVA ESUS  Hyperlipidemia  History of L MCA stroke in 7/2023, known age-indeterminant right frontal and occipital strokes found on imaging in Apr 2024. Patient reports not taking his ASA prior to admission. Presents with loss of equilibrium with n/v x 2-3 days. No extremity weakness. Some coordination concerns. No vision changes. In ED AFVSS. WBC 13.1.  MRI w/ large acute vs subacute R cerebellar CVA w/ mild edema, no hemorrhagic transformation. MRA w/ likely occlusion of R superior cerebellar artery and R AICA is not visualized.  CT C/A/P notable for wedged shaped hypoattenuation of both kidneys concerning for pyelo vs renal infarction.  , TGs 162.  A1C is 5.3.  - Appreciate Neuro consult  - Telemetry  - Q4h neuro checks  - TTE/MARIA TERESA with bubble study  - Long term BP control < 130/80  - ASA 325mg  - PT/OT/SLP  - Atorvastatin 40mg daily (not taking PTA).  - HemOnc consulted for hypercoagulability    Renal lesions: infarct vs pyelo. Denies irritative sxs. Elevated wbc cont at 15. Afebrile.  - Check UA.    Tobacco use disorder  Smokes 1ppd. Declines NRT  - Encouraged cessation    Alcohol use  Drinks 6 pack of beer ~4x/ week. No significant withdrawal noted here  - CIWA  - Encouraged cessation       PT/OT: ordered  Diet: NPO per Anesthesia Guidelines for Procedure/Surgery Except for: Meds    DVT Prophylaxis: Pneumatic Compression Devices  Velazquez Catheter: Not present  Lines: None     Cardiac Monitoring: ACTIVE order. Indication: Stroke, acute (48 hours)  Code Status: Full Code    Medically Ready  for Discharge: pending w/up and resolution of sxs. 1-3 days       Emily Yañez MD  Hospitalist Service  [facility]  Securely message with [employer] (more info)  Text page via eblizz Paging/Directory       Data reviewed today: I reviewed all new labs and imaging results over the last 24 hours.    Physical Exam   Temp: 98.3  F (36.8  C) Temp src: Oral BP: 124/89 Pulse: 84   Resp: 16 SpO2: 94 % O2 Device: None (Room air)    Vitals:    12/09/24 1539   Weight: 81.6 kg (180 lb)     Vital Signs with Ranges  Temp:  [98.3  F (36.8  C)] 98.3  F (36.8  C)  Pulse:  [75-94] 84  Resp:  [12-19] 16  BP: (112-157)/() 124/89  SpO2:  [94 %-97 %] 94 %  No intake/output data recorded.  O2 requirements: none    Constitutional: Male in NAD  HEENT: Eyes nonicteric, oral mucosa moist  Cardiovascular: RRR, normal S1/2, no m/r/g  Respiratory: CTAB, no wheezing or crackles  Vascular: No LE pitting edema  GI: Normoactive bowel sounds, nontender, nondistended  Skin/Integumen: No rashes  Neuro/Psych: Appropriate affect and mood. A&Ox3, noted dysmetria on finger to nose on the right    Medications   Current Facility-Administered Medications   Medication Dose Route Frequency Provider Last Rate Last Admin     Current Facility-Administered Medications   Medication Dose Route Frequency Provider Last Rate Last Admin    aspirin (ASA) EC tablet 325 mg  325 mg Oral Daily Luis David MD        Or    aspirin (ASA) chewable tablet 324 mg  324 mg Oral or NG Tube Daily Luis David MD   324 mg at 12/10/24 0818    Or    aspirin (ASA) Suppository 300 mg  300 mg Rectal Daily Luis David MD        atorvastatin (LIPITOR) tablet 40 mg  40 mg Oral or Feeding Tube QPM Luis David MD   40 mg at 12/10/24 1939    senna-docusate (SENOKOT-S/PERICOLACE) 8.6-50 MG per tablet 1-2 tablet  1-2 tablet Oral or NG Tube BID Luis David MD   1 tablet at 12/10/24 1939    sodium chloride (PF)  0.9% PF flush 3 mL  3 mL Intracatheter Q8H Luis David MD   3 mL at 12/10/24 2356       Data   Recent Labs   Lab 12/10/24  1015 12/10/24  0752 12/09/24  1705   WBC 15.3*  --  13.1*   HGB 16.6  --  17.6   MCV 88  --  87     --  323     --  136   POTASSIUM 3.4  --  3.9   CHLORIDE 104  --  103   CO2 21*  --  21*   BUN 11.0  --  10.7   CR 1.02  --  0.97   ANIONGAP 12  --  12   SOFI 8.9  --  9.4   * 97 95   ALBUMIN  --   --  4.4   PROTTOTAL  --   --  7.0   BILITOTAL  --   --  1.0   ALKPHOS  --   --  88   ALT  --   --  24   AST  --   --  32       Imaging:   Recent Results (from the past 24 hours)   CT Chest/Abdomen/Pelvis w Contrast    Narrative    CT CHEST/ABDOMEN/PELVIS W CONTRAST 12/10/2024 12:36 PM    CLINICAL HISTORY: recurrent stroke r/o underlying malignancy    TECHNIQUE: CT scan of the chest, abdomen, and pelvis was performed  without IV contrast. Multiplanar reformats were obtained. Dose  reduction techniques were used.   CONTRAST: None.    COMPARISON: CT abdomen and pelvis performed on 7/19/2005    FINDINGS:   LUNGS AND PLEURA: No pleural effusion or pneumothorax is present.  Subsegmental atelectasis is seen in the lung bases. Calcified  granulomas present in the right middle lobe. Punctate calcified  granuloma is noted within the left lower lobe.    MEDIASTINUM/AXILLAE: No lymphadenopathy. No thoracic aortic aneurysms.    CORONARY ARTERY CALCIFICATION: Mild.    HEPATOBILIARY: No significant mass or bile duct dilatation. No  calcified gallstones.     PANCREAS: No significant mass, duct dilatation, or inflammatory  change.    SPLEEN: Normal size.    ADRENAL GLANDS: No significant nodules.    KIDNEYS/BLADDER: No hydronephrosis is present. Wedge-shaped area of  hypoattenuation is seen along the inferior pole of the right kidney  (series 2, image 74). Similar appearing focus is also seen along the  inferior pole of the left kidney (series 6, image 58).    BOWEL: Diverticulosis in the  colon. No acute inflammatory change. No  obstruction.     LYMPH NODES: No lymphadenopathy.    VASCULATURE: No abdominal aortic aneurysm.    PELVIC ORGANS: No pelvic masses.    OTHER: None.    MUSCULOSKELETAL: Mild degenerative changes are seen in the spine.  Sclerotic changes are seen along the superior and inferior endplates  of T8.      Impression    IMPRESSION:  1.  Wedge-shaped areas of hypoattenuation are noted along the inferior  poles of both kidneys. Findings can be seen in pyelonephritis although  no significant surrounding perinephric stranding is evident. Recommend  correlation with urinalysis. Renal infarction is also in the  differential diagnosis.    HE GROVE MD         SYSTEM ID:  [id number]

## 2024-12-11 NOTE — PROGRESS NOTES
Writer is aware of UA that was ordered this am. Pt has not voided since, so unable to collect at this time. Urinal is in patient's reach for when he feels the urge, to provide us a sample. Pt verbalizes understanding of request.

## 2024-12-11 NOTE — PROGRESS NOTES
Report given to care suites RN. Pt getting placed on cart currently to go downstairs. Has remained NPO since midnight ex morning meds.

## 2024-12-11 NOTE — PLAN OF CARE
"Reason for Admission: CVA    Cognitive/Mentation: A/Ox 4  Neuros/CMS: Stable w/ L cut field, RUE ataxia    VS: VSS on RA.  /89 (BP Location: Left arm)   Pulse 84   Temp 98.3  F (36.8  C) (Oral)   Resp 16   Ht 1.803 m (5' 11\")   Wt 81.6 kg (180 lb)   SpO2 94%   BMI 25.10 kg/m        Tele: SR.  GI/: Continent. Voiding adequately and had 1 BM this shift  Pulmonary: LS clear.  Pain: Denies pain.     Drains/Lines: PIV SL  Skin: Intact  Activity: Assist x 1 with walker and gait belt.  Diet: NPO after midnight. Takes pills whole with water.     Therapies recs: ARU  Discharge: Pending    Aggression Stoplight Tool: Green    End of shift summary: NPO after midnight for MARIA TERESA at 1330.         "

## 2024-12-11 NOTE — PROGRESS NOTES
Patient transferred back to sending unit. Bedside report taken by receiving RN. No questions at this time

## 2024-12-11 NOTE — PROGRESS NOTES
Reason for Admission: CVA    Cognitive/Mentation: A/Ox 4  Neuros/CMS: Intact ex L.field-cut, RUE ataxic.  VS: Stable RA.   Tele: SR.  /GI: Continent.  Pulmonary: LS Clear.  Pain: Mild HA refused pain med.   Drains/Lines: PIV SL  Skin: Intact  Activity: Assist x 1 with GBW.  Diet: Reg with thin liquids. Takes pills whole with water.   Discharge: Pend    Aggression Stoplight Tool: Green    End of shift summary: MARIA TERESA tomorrow at NPO at midnight.

## 2024-12-11 NOTE — ANESTHESIA POSTPROCEDURE EVALUATION
Patient: Mundo Kern    Procedure: Procedure(s):  Transesophageal echocardiogram intraoperative       Anesthesia Type:  MAC    Note:  Disposition: Inpatient   Postop Pain Control: Uneventful            Sign Out: Well controlled pain   PONV: No   Neuro/Psych: Uneventful            Sign Out: Acceptable/Baseline neuro status   Airway/Respiratory: Uneventful            Sign Out: Acceptable/Baseline resp. status   CV/Hemodynamics: Uneventful            Sign Out: Acceptable CV status; No obvious hypovolemia; No obvious fluid overload   Other NRE: NONE   DID A NON-ROUTINE EVENT OCCUR?            Last vitals:  Vitals:    12/11/24 1435 12/11/24 1440 12/11/24 1445   BP: 122/87 (!) 124/90 130/78   Pulse: 81 78 78   Resp:      Temp:      SpO2: 96% 96% 96%       Electronically Signed By: Miguel Angel Spears MD  December 11, 2024  3:20 PM

## 2024-12-12 ENCOUNTER — APPOINTMENT (OUTPATIENT)
Dept: OCCUPATIONAL THERAPY | Facility: CLINIC | Age: 49
DRG: 065 | End: 2024-12-12
Payer: COMMERCIAL

## 2024-12-12 ENCOUNTER — APPOINTMENT (OUTPATIENT)
Dept: PHYSICAL THERAPY | Facility: CLINIC | Age: 49
DRG: 065 | End: 2024-12-12
Payer: COMMERCIAL

## 2024-12-12 VITALS
RESPIRATION RATE: 16 BRPM | DIASTOLIC BLOOD PRESSURE: 82 MMHG | BODY MASS INDEX: 25.2 KG/M2 | OXYGEN SATURATION: 97 % | WEIGHT: 180 LBS | TEMPERATURE: 98 F | SYSTOLIC BLOOD PRESSURE: 122 MMHG | HEIGHT: 71 IN | HEART RATE: 72 BPM

## 2024-12-12 LAB
ANION GAP SERPL CALCULATED.3IONS-SCNC: 10 MMOL/L (ref 7–15)
BUN SERPL-MCNC: 10.5 MG/DL (ref 6–20)
CALCIUM SERPL-MCNC: 9.1 MG/DL (ref 8.8–10.4)
CHLORIDE SERPL-SCNC: 104 MMOL/L (ref 98–107)
CREAT SERPL-MCNC: 1.05 MG/DL (ref 0.67–1.17)
EGFRCR SERPLBLD CKD-EPI 2021: 88 ML/MIN/1.73M2
ERYTHROCYTE [DISTWIDTH] IN BLOOD BY AUTOMATED COUNT: 12.7 % (ref 10–15)
GLUCOSE SERPL-MCNC: 101 MG/DL (ref 70–99)
HCO3 SERPL-SCNC: 24 MMOL/L (ref 22–29)
HCT VFR BLD AUTO: 47 % (ref 40–53)
HGB BLD-MCNC: 16.6 G/DL (ref 13.3–17.7)
MCH RBC QN AUTO: 30.9 PG (ref 26.5–33)
MCHC RBC AUTO-ENTMCNC: 35.3 G/DL (ref 31.5–36.5)
MCV RBC AUTO: 88 FL (ref 78–100)
PLATELET # BLD AUTO: 290 10E3/UL (ref 150–450)
POTASSIUM SERPL-SCNC: 3.8 MMOL/L (ref 3.4–5.3)
RBC # BLD AUTO: 5.37 10E6/UL (ref 4.4–5.9)
SODIUM SERPL-SCNC: 138 MMOL/L (ref 135–145)
WBC # BLD AUTO: 8.6 10E3/UL (ref 4–11)

## 2024-12-12 PROCEDURE — 99223 1ST HOSP IP/OBS HIGH 75: CPT | Performed by: INTERNAL MEDICINE

## 2024-12-12 PROCEDURE — 97530 THERAPEUTIC ACTIVITIES: CPT | Mod: GO

## 2024-12-12 PROCEDURE — 80048 BASIC METABOLIC PNL TOTAL CA: CPT | Performed by: HOSPITALIST

## 2024-12-12 PROCEDURE — 250N000011 HC RX IP 250 OP 636: Performed by: INTERNAL MEDICINE

## 2024-12-12 PROCEDURE — 97530 THERAPEUTIC ACTIVITIES: CPT | Mod: GP

## 2024-12-12 PROCEDURE — 999N000183 HC STATISTIC TEE INCLUDES SEDATION

## 2024-12-12 PROCEDURE — 250N000013 HC RX MED GY IP 250 OP 250 PS 637: Performed by: INTERNAL MEDICINE

## 2024-12-12 PROCEDURE — 120N000001 HC R&B MED SURG/OB

## 2024-12-12 PROCEDURE — 99232 SBSQ HOSP IP/OBS MODERATE 35: CPT | Performed by: HOSPITALIST

## 2024-12-12 PROCEDURE — 36415 COLL VENOUS BLD VENIPUNCTURE: CPT | Performed by: HOSPITALIST

## 2024-12-12 PROCEDURE — 97535 SELF CARE MNGMENT TRAINING: CPT | Mod: GO

## 2024-12-12 PROCEDURE — 85027 COMPLETE CBC AUTOMATED: CPT | Performed by: HOSPITALIST

## 2024-12-12 PROCEDURE — 82310 ASSAY OF CALCIUM: CPT | Performed by: HOSPITALIST

## 2024-12-12 PROCEDURE — 97112 NEUROMUSCULAR REEDUCATION: CPT | Mod: GO

## 2024-12-12 PROCEDURE — 99232 SBSQ HOSP IP/OBS MODERATE 35: CPT | Mod: GC | Performed by: PSYCHIATRY & NEUROLOGY

## 2024-12-12 RX ORDER — AMLODIPINE BESYLATE 10 MG/1
10 TABLET ORAL DAILY
Status: DISCONTINUED | OUTPATIENT
Start: 2024-12-12 | End: 2024-12-12

## 2024-12-12 RX ADMIN — ATORVASTATIN CALCIUM 40 MG: 40 TABLET, FILM COATED ORAL at 20:24

## 2024-12-12 RX ADMIN — ASPIRIN 325 MG: 325 TABLET, COATED ORAL at 10:36

## 2024-12-12 RX ADMIN — ONDANSETRON 4 MG: 4 TABLET, ORALLY DISINTEGRATING ORAL at 16:53

## 2024-12-12 ASSESSMENT — ACTIVITIES OF DAILY LIVING (ADL)
ADLS_ACUITY_SCORE: 33
ADLS_ACUITY_SCORE: 35
ADLS_ACUITY_SCORE: 29
ADLS_ACUITY_SCORE: 33
ADLS_ACUITY_SCORE: 35
ADLS_ACUITY_SCORE: 33
ADLS_ACUITY_SCORE: 33
ADLS_ACUITY_SCORE: 29
ADLS_ACUITY_SCORE: 29
ADLS_ACUITY_SCORE: 33
ADLS_ACUITY_SCORE: 35
ADLS_ACUITY_SCORE: 33
ADLS_ACUITY_SCORE: 35
ADLS_ACUITY_SCORE: 29
ADLS_ACUITY_SCORE: 35

## 2024-12-12 NOTE — PLAN OF CARE
Goal Outcome Evaluation:      Plan of Care Reviewed With: patient        Stroke.  Neuros - alert & oriented, following commands, right arm weakness improving/no drift today; less coordinated with finger to nose on right/weakness; vision deficits - reading glasses available; left eye vision fields blurry/slight field cut; grasps equal strength; no dizziness; denying numbness or tingling. VSS, rA, no shortness of breath. Tele  - SR with BBB. Regular diet, good appetite, meds whole with water.  Up with assist/gait belt & walker, ambulated in fraser with therapy & to bathroom; tolerating up in chair. Continent of bladder, soft bm today. Denies pain. Pt scoring green on the Aggression Stop Light Tool. Awaiting hem/onc and cardiology consult. Pending a ECHO as well. Mother at bedside, supportive.

## 2024-12-12 NOTE — PLAN OF CARE
"Reason for Admission: CVA     Cognitive/Mentation: A/Ox 4  Neuros/CMS: Stable w/ L cut field, RUE ataxia. No dizziness this shift     VS: VSS on RA.  /73 (BP Location: Left arm)   Pulse 81   Temp 98.4  F (36.9  C) (Oral)   Resp 18   Ht 1.803 m (5' 11\")   Wt 81.6 kg (180 lb)   SpO2 96%   BMI 25.10 kg/m         Tele: SR.  GI/: Continent. Voiding adequately, no BM this shift  Pulmonary: LS clear.  Pain: Denies pain.      Drains/Lines: PIV SL  Skin: Intact  Activity: Assist x 1 with walker and gait belt.  Diet: Regular with thin liquid. Takes pills whole with water.      Therapies recs: ARU vs Home  Discharge: Pending HemOnc consult for hypercoagulability      Aggression Stoplight Tool: Green     End of shift summary: Continue with plan of care.     "

## 2024-12-12 NOTE — CONSULTS
[facility]    Cardiology Consult Note- Cardiology    Date of Admission:  12/9/2024  Reason for Consult: Stroke and cardiac source of embolism    History of Present Illness   Mundo Kern is a 49 year old male admitted on 12/9/2024. He is a pleasant 49-year-old gentleman he has had strokes going back last year his background history is excess alcohol tobacco and unfortunately noncompliance he had an echocardiogram 1 year ago transesophageal with his stroke however that MARIA TERESA echo was aborted and he only got limited images because the patient pulled the tube out however he came with another stroke and in fact this might be his third stroke and there is a question of bilateral renal infarcts.  A transesophageal echo was done yesterday I reviewed it today with the stroke team.  The aortic valve is within normal trileaflet but there is a strain and that is at least 10 mm that can be seen swinging off the aortic valve leaflet into the LV outflow tract and into the acing aorta.  I am actually a little suspicious there may be a second strand elsewhere it is hard for me to tell but I think one of the strands might be coming off the noncoronary cusp and the other 1 possibly the right coronary cusp.  The valve itself is not leaking    A limited bubble study was performed the patient was imaged yesterday transesophageal echo with general anesthesia so it Valsalva cannot be done on the limited bubble study there is no shunt right to left yesterday or the day before a transthoracic echo was done with a better bubble study and that was negative.  The 2023 MARIA TERESA echo did not have a bubble study was aborted.    Now looking back on the 20 23T echo which was aborted there probably was a strand present even back then it was just that the study was so limited the patient was initially prescribed aspirin and Plavix for 3 weeks and continue aspirin I do not think he followed up on that he certainly was not on  aspirin for this admission.  Of note he does not have any recollection for any back pain problems I bring that up because of the question of bilateral kidney infarctions.  There is also no history of hematuria.  At his previous workup I am told the other workup from the neuroteam was negative for stroke causes such as genetic predisposition and factor families only stroke history is of his maternal grandmother had a stroke in her 60s there is no clotting disorder elsewhere    Last year cardiolipin IgG and IgM negative LDL cholesterol was mildly elevated 145 he was supposed to be taking statin but is not on it factor V Leiden and factor II mutation analysis is all negative.  Lupus anticoagulant panel negative protein C normal Antithrombin III normal thrombin time normal INR PTT normal    CTA of the head vessels 7/22/2023 normal.  MRI this admission shows large acute versus subacute right cerebellar hemisphere infarct with mild associated edema remote R PCA territory infarct and likely small right frontal operculum infarct no large vessel occlusions or stenoses likely occlusion of the right superior cerebellar artery in the right AICA not visualized    Assessment & Plan: HVSL   1.  I did talk to the stroke team and reviewed with him the actual T echo I have also talked to Dr. Anne from cardiovascular surgery.  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  It was.  It was probably present in the 20 23T echo but that study was aborted there is only 1 image that might of caused it.  Bubble study is essentially negative (see discussion above.  The patient technically failed medication or fail because he did not take it the first place.  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    Given that this is his third stroke and possibly renal infarct that there is a reliability issue for blood thinners I think surgical consult for possible open heart surgery scraping the valve and pathology might make the most sense.  1 can restart his statin medicine but I do not think that is the key issue here I will leave it to neurology if they want to hold him with aspirin or Plavix or in combination with a NOAC agent while we are determining if the patient should go to surgery or not.  This was all discussed with the stroke team the patient his mother and Dr. Dayana Marx MD  ______________________________________________________________________    Past Medical History    Past Medical History:   Diagnosis Date    Cerebrovascular accident (H)     Tobacco use disorder        Past Surgical History   Past Surgical History:   Procedure Laterality Date    TRANSESOPHAGEAL ECHOCARDIOGRAM INTRAOPERATIVE N/A 12/11/2024    Procedure: Transesophageal echocardiogram intraoperative;  Surgeon: GENERIC ANESTHESIA PROVIDER;  Location:  OR       Family History   No family history on file.     Social History   Social History     Socioeconomic History    Marital status: Single   Tobacco Use    Smoking status: Every Day     Current packs/day: 1.00     Types: Cigarettes   Substance and Sexual Activity    Alcohol use: Yes     Comment: 6 pack of beer 3-4x per week     Social Drivers of Health     Financial Resource Strain: Low Risk  (12/10/2024)    Financial Resource Strain     Within the past 12 months, have you or your family members you live with been unable to get utilities (heat, electricity) when it was really needed?: No   Food Insecurity: Low Risk  (12/10/2024)    Food Insecurity     Within the past 12 months, did you worry that your food would run out before you got money to buy more?: No     Within the past 12 months, did the food you bought just not last and you  "didn t have money to get more?: No   Transportation Needs: Low Risk  (12/10/2024)    Transportation Needs     Within the past 12 months, has lack of transportation kept you from medical appointments, getting your medicines, non-medical meetings or appointments, work, or from getting things that you need?: No    Received from [facility]    Social Connections   Interpersonal Safety: Low Risk  (12/10/2024)    Interpersonal Safety     Do you feel physically and emotionally safe where you currently live?: Yes     Within the past 12 months, have you been hit, slapped, kicked or otherwise physically hurt by someone?: No     Within the past 12 months, have you been humiliated or emotionally abused in other ways by your partner or ex-partner?: No   Housing Stability: Low Risk  (12/10/2024)    Housing Stability     Do you have housing? : Yes     Are you worried about losing your housing?: No        Medications   No medications prior to admission.       Allergies    No Known Allergies     Review of Systems    Skin: Negative  Eyes: Negative  ENT: Negative  Respiratory: Negative negative  Cardiovascular: Negative  Gastroenterology: Negative  Genitourinary: Negative  Musculoskeletal: Negative  Neurologic: Multiple strokes as above  Psychiatric: Negative  Heme/Lymph/Imm: Negative  Endocrine: Negative    Physical Exam   Vitals: /86 (BP Location: Right arm)   Pulse 67   Temp 98.2  F (36.8  C)   Resp 16   Ht 1.803 m (5' 11\")   Wt 81.6 kg (180 lb)   SpO2 98%   BMI 25.10 kg/m    Constitutional: Alert and oriented  Skin: No rash no peripheral stigmata of emboli  Head: Normal  Eyes: Nonicteric  Lymph: No palpable cervical nodes  ENT: Not examined  Neck: Normal carotid upstroke no thyromegaly no bruit  Respiratory: Clear lung fields  Cardiac: Regular rhythm no murmur or rub  GI: No organomegaly no tenderness no bruit  Extremities and Muscular Skeletal: No cyanosis clubbing edema or " "cords  Neurological: Stroke see above  Psych: Alert and oriented    Medical Decision Making       This was a 90-minute visit I talked to the patient and his mother I separately talked to Dr. Anne from cardiovascular surgery I talked to his stroke team I reviewed the current and the previous transesophageal echo    Data     I have personally reviewed the following data over the past 24 hrs:    8.6  \   16.6   / 290     138 104 10.5 /  101 (H)   3.8 24 1.05 \         Imaging results reviewed over the past 24 hrs:   No results found for this or any previous visit (from the past 24 hours).      Clinically Significant Risk Factors                              # Overweight: Estimated body mass index is 25.1 kg/m  as calculated from the following:    Height as of this encounter: 1.803 m (5' 11\").    Weight as of this encounter: 81.6 kg (180 lb)., PRESENT ON ADMISSION     # Financial/Environmental Concerns: none          "

## 2024-12-12 NOTE — PLAN OF CARE
"1900-2330:  Reason for Admission: Nausea/diarrhea/dizziness --> R cerebellum CVA    Cognitive/Mentation: A/Ox 4  Neuros/CMS: Intact ex reporting slight blurriness to LUQ of left eye; reports that this has been happening intermittently \"for awhile\". RUE ataxia; improving per pt. Slow to respond with some commands.  VS: VSS.   Tele: SR w/ BBB.  /GI: Continent. Last BM today, refused PM senna.   Pulmonary: LS clear.  Pain: Denies.     Drains/Lines: PIV SL  Skin: WNL  Activity: Assist x 1 with GB/walker.   Diet: Regular with thin liquids. Takes pills whole with water.     Therapies recs: home with assist vs. ARU  Discharge: Pending; awaiting Hemac eval and recs    Aggression Stoplight Tool: Green    End of shift summary: CIWA score 0 this shift. Denied dizziness this shift.       "

## 2024-12-12 NOTE — PROGRESS NOTES
[facility]    Hospitalist Progress Note    Interval History   Improving coordination. No dizziness. No nausea.  No cp/sob.  No irritative voiding sxs.    Assessment & Plan   Summary: Mundo Kern is a 49 year old male with PMH multiple prior embolic strokes of undetermined source, HLD, alcohol use, who was admitted on 12/9/2024 with a cerebellar stroke.    Cerebellar CVA, likely ESUS  Hx CVA ESUS  Hyperlipidemia  History of L MCA stroke in 7/2023, known age-indeterminant right frontal and occipital strokes found on imaging in Apr 2024. Patient reports not taking his ASA prior to admission. Presents with loss of equilibrium with n/v x 2-3 days. No extremity weakness. Some coordination concerns. No vision changes. In ED AFVSS. WBC 13.1.  MRI w/ large acute vs subacute R cerebellar CVA w/ mild edema, no hemorrhagic transformation. MRA w/ likely occlusion of R superior cerebellar artery and R AICA is not visualized.  CT C/A/P notable for wedged shaped hypoattenuation of both kidneys concerning for pyelo vs renal infarction.  , TGs 162.  A1C is 5.3.    MARIA TERESA as below:  Left ventricular systolic function is normal.  The visual ejection fraction is 60-65%.  No regional wall motion abnormalities noted.  A mobile large linear echodensity (15mm) is noted attached to the left  ventricular aspect of the aortic valve prolapsing into the aortic root.  Differential includes large degenerative strand versus early fibroblastoma.  vegetation and clot less likely but not entirely excluded. Clinical  correlation advised.  There is no color Doppler evidence of an atrial shunt.  A contrast injection (Bubble Study) was performed that was negative for flow  across the interatrial septum.  Valsalva could not be performed. Abdominal pressure applied.  No thrombus is detected in the left atrial appendage.    - Telemetry  - Q4h neuro checks  - Long term BP control < 130/80  - ASA 325mg  - PT/OT/SLP  -  Atorvastatin 40mg daily.  - Follow neurology recs.  - HemOnc consulted for hypercoagulability  - Cards input regarding abnormal TTE.    Renal lesions: infarct vs pyelo. UA neg for infection. Denies irritative sxs. Elevated wbc cont at 15. Afebrile.    Tobacco use disorder  Smokes 1ppd. Declines NRT  - Encouraged cessation    Alcohol use  Drinks 6 pack of beer ~4x/ week. No significant withdrawal noted here  - CIWA  - Encouraged cessation       PT/OT: ordered  Diet: Regular Diet Adult Thin Liquids (level 0)    DVT Prophylaxis: Pneumatic Compression Devices  Velazquez Catheter: Not present  Lines: None     Cardiac Monitoring: ACTIVE order. Indication: Procedural area  Code Status: Full Code    Medically Ready for Discharge: pending w/up and resolution of sxs. 1-3 days       Emily Yañez MD  Hospitalist Service  [facility]  Securely message with [email or web address] (more info)  Text page via [employer] Paging/Directory       Data reviewed today: I reviewed all new labs and imaging results over the last 24 hours.    Physical Exam   Temp: 98.4  F (36.9  C) Temp src: Oral BP: 102/73 Pulse: 81   Resp: 18 SpO2: 96 % O2 Device: None (Room air) Oxygen Delivery: 5 LPM  Vitals:    12/09/24 1539   Weight: 81.6 kg (180 lb)     Vital Signs with Ranges  Temp:  [97.8  F (36.6  C)-98.5  F (36.9  C)] 98.4  F (36.9  C)  Pulse:  [] 81  Resp:  [14-18] 18  BP: (102-163)/() 102/73  SpO2:  [94 %-100 %] 96 %  I/O last 3 completed shifts:  In: 500 [I.V.:500]  Out: 300 [Urine:300]  O2 requirements: none    Constitutional: Male in NAD  HEENT: Eyes nonicteric, oral mucosa moist  Cardiovascular: RRR, normal S1/2, no m/r/g  Respiratory: CTAB, no wheezing or crackles  Vascular: No LE pitting edema  GI: Normoactive bowel sounds, nontender, nondistended  Skin/Integumen: No rashes  Neuro/Psych: Appropriate affect and mood. A&Ox3, noted dysmetria on finger to nose on the right    Medications   Current Facility-Administered Medications    Medication Dose Route Frequency Provider Last Rate Last Admin    May take oral meds with a sip of water, the morning of MARIA TERESA procedure.   Does not apply Continuous PRN Brice Rahman MD         Current Facility-Administered Medications   Medication Dose Route Frequency Provider Last Rate Last Admin    aspirin (ASA) EC tablet 325 mg  325 mg Oral Daily Luis David MD        Or    aspirin (ASA) chewable tablet 324 mg  324 mg Oral or NG Tube Daily Luis David MD   324 mg at 12/11/24 0818    Or    aspirin (ASA) Suppository 300 mg  300 mg Rectal Daily Luis David MD        atorvastatin (LIPITOR) tablet 40 mg  40 mg Oral or Feeding Tube QPM Luis David MD   40 mg at 12/11/24 2043    glycopyrrolate (ROBINUL) injection 0.1 mg  0.1 mg Intravenous Once Luis David MD        senna-docusate (SENOKOT-S/PERICOLACE) 8.6-50 MG per tablet 1-2 tablet  1-2 tablet Oral or NG Tube BID Luis David MD   1 tablet at 12/11/24 0818    sodium chloride (PF) 0.9% PF flush 3 mL  3 mL Intravenous Q8H Brice Rahman MD   3 mL at 12/12/24 0047    sodium chloride (PF) 0.9% PF flush 3 mL  3 mL Intracatheter Q8H Luis David MD   3 mL at 12/11/24 1609       Data   Recent Labs   Lab 12/10/24  1015 12/10/24  0752 12/09/24  1705   WBC 15.3*  --  13.1*   HGB 16.6  --  17.6   MCV 88  --  87     --  323     --  136   POTASSIUM 3.4  --  3.9   CHLORIDE 104  --  103   CO2 21*  --  21*   BUN 11.0  --  10.7   CR 1.02  --  0.97   ANIONGAP 12  --  12   SOFI 8.9  --  9.4   * 97 95   ALBUMIN  --   --  4.4   PROTTOTAL  --   --  7.0   BILITOTAL  --   --  1.0   ALKPHOS  --   --  88   ALT  --   --  24   AST  --   --  32       Imaging:   Recent Results (from the past 24 hours)   Transesophageal Echocardiogram   Result Value    LVEF  60-65%    Narrative    [id number]  [id number]  [id number]^LUIS ANGEL^BRICE^ARIN HERNANDEZ     [place]  [facility]  Echocardiography Laboratory  [address] [place] [address]     Name: MARK MEYER  MRN: [id number]  : 1975  Study Date: 2024 01:34 PM  Age: 49 yrs  Gender: Male  Patient Location: [facility]  Reason For Study: CVA  Ordering Physician: ALEXEY PLASENCIA  Performed By: Frieda Medeiros     BSA: 2.0 m2  Height: 71 in  Weight: 180 lb  HR: 96  BP: 128/93 mmHg  ______________________________________________________________________________  Procedure  Complete MARIA TERESA Adult. MARIA TERESA Probe serial #[id number] was used during the procedure.  ______________________________________________________________________________  Interpretation Summary     Left ventricular systolic function is normal.  The visual ejection fraction is 60-65%.  No regional wall motion abnormalities noted.  A mobile large linear echodensity (15mm) is noted attached to the left  ventricular aspect of the aortic valve prolapsing into the aortic root.  Differential includes large degenerative strand versus early fibroblastoma.  vegetation and clot less likely but not entirely excluded. Clinical  correlation advised.  There is no color Doppler evidence of an atrial shunt.  A contrast injection (Bubble Study) was performed that was negative for flow  across the interatrial septum.  Valsalva could not be performed. Abdominal pressure applied.  No thrombus is detected in the left atrial appendage.  ______________________________________________________________________________  MARIA TERESA  Sedation was administered and monitored by anesthesia. Sedation, endotracheal  intubation, and mechanical ventilation were initiated prior to the MARIA TERESA and  were monitored by anesthesia. The transesophageal probe was passed without  difficulty. Prior to the exam, the oral cavity was checked and no overcrowding  was noted.     Left Ventricle  The left ventricle is normal in size. There is normal left ventricular wall  thickness. Left ventricular  systolic function is normal. The visual ejection  fraction is 60-65%. No regional wall motion abnormalities noted.     Right Ventricle  The right ventricle is normal size. The right ventricular systolic function is  normal.     Atria  Normal left atrial size. Right atrial size is normal. There is no color  Doppler evidence of an atrial shunt. A contrast injection (Bubble Study) was  performed that was negative for flow across the interatrial septum. Valsalva  could not be performed. Abdominal pressure applied. No thrombus is detected in  the left atrial appendage.     Mitral Valve  There is mild (1+) mitral regurgitation.     Tricuspid Valve  There is trace tricuspid regurgitation. Right ventricular systolic pressure  could not be approximated due to inadequate tricuspid regurgitation.     Aortic Valve  The aortic valve is trileaflet. No aortic regurgitation is present. No aortic  stenosis is present.     Pulmonic Valve  The pulmonic valve is not well seen, but is grossly normal. There is trace  pulmonic valvular regurgitation.     Vessels  The aortic root is normal size.     Pericardial/Pleural  There is no pericardial effusion.     Rhythm  Sinus rhythm was noted.  ______________________________________________________________________________  MMode/2D Measurements & Calculations  Ao root diam: 3.8 cm  Ao root diam index Ht(cm/m): 2.1  Ao root diam index BSA (cm/m2): 1.9     ______________________________________________________________________________  Report approved by: Sarkis Lewis MD on 12/11/2024 03:33 PM

## 2024-12-12 NOTE — PLAN OF CARE
Goal Outcome Evaluation:      Plan of Care Reviewed With: patient      Patient alert, oriented times 4. Neuro's with right UE ataxia, weakness, intermittent dizziness. VSS. Tele SR with BBB. Up with SBA, with Gb and walker. Continent of bowel and bladder. On a regular diet, good appetite. Family brought food. MARIA TERESA completed this afternoon. Plan for hem/onc consult.

## 2024-12-12 NOTE — PROGRESS NOTES
[facility]    Vascular Neurology Progress Note    Interval History     Mundo was seen and examined this AM, states that his gait unsteadiness and difficulty with coordination is getting better,  discussed MARIA TERESA and CT CAP results w/ pt.     Hospital Course     Chief complaint: Dizziness (/) and Vomiting    49-year-old male with past medical history significant for hyperlipidemia, Tobaccoism, Et0h abuse, medication noncompliance and ischemic strokes who presented with 4 days of gait instability, disequilibrium, nausea/vomiting and diarrhea. On admission, /94, MRI brain showed an acute infarct of right cerebellum mainly involving right SCA territory. MRA head and neck revealed occlusion of right superior cerebellar artery. CBC showing leukocytosis, A1c at goal, .  CT CAP with contrast showing wedge-shaped infarcts in both kidneys concerning for a systemic embolic etiology.     Assessment and Plan     Acute ischemic stroke of right cerebellum due to embolic stroke of undetermined source (ESUS)  Wedge-shaped infarcts of bilateral kidneys  History of ischemic strokes  Medication noncompliance  Tobaccoism   Et0h abuse        49-year-old male with past medical history significant for hyperlipidemia, Tobaccoism, Et0h abuse, medication noncompliance and ischemic strokes who presented with 4 days of gait instability, disequilibrium, nausea/vomiting and diarrhea. On admission, /94, MRI brain showed an acute infarct of right cerebellum mainly involving right SCA territory. MRA head and neck revealed occlusion of right superior cerebellar artery. CBC showing leukocytosis, A1c at goal, .  Etiology of recurrent ischemic strokes is likely ESUS, additional contributors include medication noncompliance, smoking/Et0h abuse and some vascular risk factors.  Given relatively young age would like to rule out additional causes of his ischemic stroke including but not limited to  "hypercoagulable and cardioembolic source for his strokes, will also let heme-onc weigh in on potential hypercoagulability as the cause of his recurrent ischemic strokes, also his CT chest abdomen pelvis showed wedge-shaped infarcts in both kidneys further solidifying our hypothesis of systemic embolic vs hypercoagulable etiology. MARIA TERESA showed a mobile large linear echodensity (15mm) is noted attached to the aortic valve of unknown etiology, will consult Cardiology consult for further eval of cardiac echodensity and to see to see if he needs cardiac MRI to better characterize.      DVT Prophylaxis: ok for chemical DVT prophylaxis      Recommendations   - Consult heme-onc for hypercoagulability eval  - Cardiology consult for further eval of cardiac echodensity and to see if he needs cardiac MRI to better characterize.    - TTE  - Neurochecks and Vital Signs every 4 hours   - Long-term blood pressure goal <130/80  - Daily aspirin 325 mg for secondary stroke prevention  - Statin: atorvastatin 40mg daily  - Telemetry, EKG  - Bedside Glucose Monitoring  - Nutrition: pending swallow screen  - PT/OT/SLP/Rehab  - Stroke Education  - Smoking Cessation  - Euthermia, Euglycemia    Patient Follow-up    - in 6-8 weeks with general neurology or stroke CONY ([phone])    We will continue to follow.       The Stroke Staff is Dr. Redman.    Brice Rahman MD  Vascular Neurology Fellow    To page me or covering stroke neurology team member, click here: AMCOM  Choose \"On Call\" tab at top, then select \"NEUROLOGY/ALL SITES\" from middle drop-down box, press Enter, then look for \"stroke\" or \"telestroke\" for your site.    Physical Examination     Temp: 98.6  F (37  C) Temp src: Oral BP: 119/75 Pulse: 75   Resp: 16 SpO2: 98 % O2 Device: None (Room air) Oxygen Delivery: 5 LPM    Neurologic    Mental Status:  alert, oriented x 3, follows commands, no aphasia, naming and repetition normal  Cranial Nerves:  visual fields : Left " superior temporal field defect, facial movements symmetric, hearing not formally tested but intact to conversation, no dysarthria  Motor:  normal muscle tone and bulk, no abnormal movements, able to move all limbs spontaneously, no pronator drift, strength full in all 4 extremities.  Slowed rapid movements on right  Coordination: Dysmetria on finger-to-nose on right  Station/Gait:  deferred    Stroke Scales     Stroke Scales     NIHSS  1a. Level of Consciousness  0   1b. LOC Questions  0   1c. LOC Commands  0   2.   Best Gaze  0   3.   Visual  1   4.   Facial Palsy  0   5a. Motor Arm, Left  0   5b. Motor Arm, Right  0   6a. Motor Leg, Left  0   6b. Motor Leg, right  0   7.   Limb Ataxia  1   8.   Sensory  0   9.   Best Language  0   10. Dysarthria  0   11. Extinction and Inattention   0   Total  2           Imaging/Labs   (Bolded imaging and labs new and/or personally reviewed or re-reviewed by me today)  MRI/Head CT Large right cerebellar hemisphere infarct, no hemorrhage, no mass effect   Intracranial Vasculature No large vessel occlusion; likely right superior cerebellar artery occlusion. Could not visualize right AICA   Cervical Vasculature Normal      Echocardiogram Ordered, pending    EKG/Telemetry Normal sinus rhythm   Other Testing Not Applicable       LDL  12/9/2024: 145 mg/dL   A1C  12/9/2024: 5.3 %   Troponin 12/9/2024: <6 ng/L           Other labs reviewed by me today:       Testing Not Applicable       LDL  12/9/2024: 145 mg/dL   A1C  12/9/2024: 5.3 %   Troponin 12/9/2024: <6 ng/L           Other labs reviewed by me today:

## 2024-12-12 NOTE — PROGRESS NOTES
Care Management Follow Up    Length of Stay (days): 3    Expected Discharge Date: 12/13/2024     Concerns to be Addressed: discharge planning     Patient plan of care discussed at interdisciplinary rounds: Yes    Anticipated Discharge Disposition: Home  Anticipated Discharge Services: None  Anticipated Discharge DME: None    Referrals Placed by CM/SW:  ARU (no longer recommended)  Private pay costs discussed: Not applicable    Discussed  Partnership in Safe Discharge Planning  document with patient/family: No     Handoff Completed: No, handoff not indicated or clinically appropriate    Additional Information:  Therapy recommendations changed from ARU to home with assistance and outpatient therapies.     Barbara Day, CRISTIAN, LGSW   Social Work   [facility]

## 2024-12-12 NOTE — PROGRESS NOTES
SLP:      Per chart review, PT currently recommending discharge to home and OT to ARU vs home. Will defer further IP SLP services, however given diagnosis and cognitive deficits noted per OT documentation, recommend cognitive-linguistic evalaution at next level of care (ARU vs OP pending discharge disposition).

## 2024-12-13 ENCOUNTER — APPOINTMENT (OUTPATIENT)
Dept: CT IMAGING | Facility: CLINIC | Age: 49
DRG: 065 | End: 2024-12-13
Attending: PHYSICIAN ASSISTANT
Payer: COMMERCIAL

## 2024-12-13 ENCOUNTER — APPOINTMENT (OUTPATIENT)
Dept: PHYSICAL THERAPY | Facility: CLINIC | Age: 49
DRG: 065 | End: 2024-12-13
Payer: COMMERCIAL

## 2024-12-13 ENCOUNTER — APPOINTMENT (OUTPATIENT)
Dept: OCCUPATIONAL THERAPY | Facility: CLINIC | Age: 49
DRG: 065 | End: 2024-12-13
Payer: COMMERCIAL

## 2024-12-13 ENCOUNTER — APPOINTMENT (OUTPATIENT)
Dept: CARDIOLOGY | Facility: CLINIC | Age: 49
DRG: 065 | End: 2024-12-13
Attending: PHYSICIAN ASSISTANT
Payer: COMMERCIAL

## 2024-12-13 LAB
ABO + RH BLD: NORMAL
ALBUMIN SERPL BCG-MCNC: 4 G/DL (ref 3.5–5.2)
ALP SERPL-CCNC: 82 U/L (ref 40–150)
ALT SERPL W P-5'-P-CCNC: 24 U/L (ref 0–70)
ANION GAP SERPL CALCULATED.3IONS-SCNC: 12 MMOL/L (ref 7–15)
AST SERPL W P-5'-P-CCNC: 28 U/L (ref 0–45)
BILIRUB DIRECT SERPL-MCNC: <0.2 MG/DL (ref 0–0.3)
BILIRUB SERPL-MCNC: 0.7 MG/DL
BLD GP AB SCN SERPL QL: NEGATIVE
BUN SERPL-MCNC: 8 MG/DL (ref 6–20)
CALCIUM SERPL-MCNC: 8.8 MG/DL (ref 8.8–10.4)
CHLORIDE SERPL-SCNC: 107 MMOL/L (ref 98–107)
CREAT SERPL-MCNC: 0.92 MG/DL (ref 0.67–1.17)
CRP SERPL-MCNC: 3.52 MG/L
EGFRCR SERPLBLD CKD-EPI 2021: >90 ML/MIN/1.73M2
ERYTHROCYTE [DISTWIDTH] IN BLOOD BY AUTOMATED COUNT: 12.6 % (ref 10–15)
ERYTHROCYTE [SEDIMENTATION RATE] IN BLOOD BY WESTERGREN METHOD: 4 MM/HR (ref 0–15)
GLUCOSE SERPL-MCNC: 101 MG/DL (ref 70–99)
HCO3 SERPL-SCNC: 22 MMOL/L (ref 22–29)
HCT VFR BLD AUTO: 45.8 % (ref 40–53)
HGB BLD-MCNC: 16.2 G/DL (ref 13.3–17.7)
INR PPP: 1.03 (ref 0.85–1.15)
MCH RBC QN AUTO: 31 PG (ref 26.5–33)
MCHC RBC AUTO-ENTMCNC: 35.4 G/DL (ref 31.5–36.5)
MCV RBC AUTO: 88 FL (ref 78–100)
PLATELET # BLD AUTO: 268 10E3/UL (ref 150–450)
POTASSIUM SERPL-SCNC: 4 MMOL/L (ref 3.4–5.3)
PROCALCITONIN SERPL IA-MCNC: 0.1 NG/ML
PROT SERPL-MCNC: 6.3 G/DL (ref 6.4–8.3)
RBC # BLD AUTO: 5.23 10E6/UL (ref 4.4–5.9)
SODIUM SERPL-SCNC: 141 MMOL/L (ref 135–145)
SPECIMEN EXP DATE BLD: NORMAL
WBC # BLD AUTO: 8.6 10E3/UL (ref 4–11)

## 2024-12-13 PROCEDURE — 250N000013 HC RX MED GY IP 250 OP 250 PS 637: Performed by: INTERNAL MEDICINE

## 2024-12-13 PROCEDURE — 97112 NEUROMUSCULAR REEDUCATION: CPT | Mod: GO

## 2024-12-13 PROCEDURE — 86850 RBC ANTIBODY SCREEN: CPT | Performed by: SURGERY

## 2024-12-13 PROCEDURE — 97116 GAIT TRAINING THERAPY: CPT | Mod: GP

## 2024-12-13 PROCEDURE — 99232 SBSQ HOSP IP/OBS MODERATE 35: CPT | Performed by: HOSPITALIST

## 2024-12-13 PROCEDURE — 75574 CT ANGIO HRT W/3D IMAGE: CPT

## 2024-12-13 PROCEDURE — 99222 1ST HOSP IP/OBS MODERATE 55: CPT | Performed by: PHYSICIAN ASSISTANT

## 2024-12-13 PROCEDURE — 250N000011 HC RX IP 250 OP 636: Performed by: HOSPITALIST

## 2024-12-13 PROCEDURE — 250N000013 HC RX MED GY IP 250 OP 250 PS 637: Performed by: PHYSICIAN ASSISTANT

## 2024-12-13 PROCEDURE — 97535 SELF CARE MNGMENT TRAINING: CPT | Mod: GO

## 2024-12-13 PROCEDURE — 86900 BLOOD TYPING SEROLOGIC ABO: CPT | Performed by: SURGERY

## 2024-12-13 PROCEDURE — 36415 COLL VENOUS BLD VENIPUNCTURE: CPT | Performed by: PHYSICIAN ASSISTANT

## 2024-12-13 PROCEDURE — 82040 ASSAY OF SERUM ALBUMIN: CPT | Performed by: PHYSICIAN ASSISTANT

## 2024-12-13 PROCEDURE — 250N000011 HC RX IP 250 OP 636: Performed by: INTERNAL MEDICINE

## 2024-12-13 PROCEDURE — 120N000001 HC R&B MED SURG/OB

## 2024-12-13 PROCEDURE — 84155 ASSAY OF PROTEIN SERUM: CPT | Performed by: PHYSICIAN ASSISTANT

## 2024-12-13 PROCEDURE — 86140 C-REACTIVE PROTEIN: CPT | Performed by: PHYSICIAN ASSISTANT

## 2024-12-13 PROCEDURE — 85652 RBC SED RATE AUTOMATED: CPT | Performed by: PHYSICIAN ASSISTANT

## 2024-12-13 PROCEDURE — 85610 PROTHROMBIN TIME: CPT | Performed by: PHYSICIAN ASSISTANT

## 2024-12-13 PROCEDURE — 80048 BASIC METABOLIC PNL TOTAL CA: CPT | Performed by: PHYSICIAN ASSISTANT

## 2024-12-13 PROCEDURE — 84295 ASSAY OF SERUM SODIUM: CPT | Performed by: PHYSICIAN ASSISTANT

## 2024-12-13 PROCEDURE — 82435 ASSAY OF BLOOD CHLORIDE: CPT | Performed by: PHYSICIAN ASSISTANT

## 2024-12-13 PROCEDURE — 97112 NEUROMUSCULAR REEDUCATION: CPT | Mod: GP

## 2024-12-13 PROCEDURE — 75574 CT ANGIO HRT W/3D IMAGE: CPT | Mod: 26 | Performed by: INTERNAL MEDICINE

## 2024-12-13 PROCEDURE — 84145 PROCALCITONIN (PCT): CPT | Performed by: PHYSICIAN ASSISTANT

## 2024-12-13 PROCEDURE — 87040 BLOOD CULTURE FOR BACTERIA: CPT | Performed by: PHYSICIAN ASSISTANT

## 2024-12-13 PROCEDURE — 85014 HEMATOCRIT: CPT | Performed by: PHYSICIAN ASSISTANT

## 2024-12-13 PROCEDURE — 70486 CT MAXILLOFACIAL W/O DYE: CPT

## 2024-12-13 PROCEDURE — 99231 SBSQ HOSP IP/OBS SF/LOW 25: CPT | Mod: 25 | Performed by: INTERNAL MEDICINE

## 2024-12-13 RX ORDER — ENOXAPARIN SODIUM 100 MG/ML
40 INJECTION SUBCUTANEOUS EVERY 24 HOURS
Status: DISCONTINUED | OUTPATIENT
Start: 2024-12-13 | End: 2024-12-14 | Stop reason: HOSPADM

## 2024-12-13 RX ORDER — METOPROLOL TARTRATE 25 MG/1
25-100 TABLET, FILM COATED ORAL
Status: DISCONTINUED | OUTPATIENT
Start: 2024-12-13 | End: 2024-12-13

## 2024-12-13 RX ORDER — CEFAZOLIN SODIUM 2 G/100ML
2 INJECTION, SOLUTION INTRAVENOUS
OUTPATIENT
Start: 2024-12-13

## 2024-12-13 RX ORDER — ONDANSETRON 2 MG/ML
4 INJECTION INTRAMUSCULAR; INTRAVENOUS
Status: DISCONTINUED | OUTPATIENT
Start: 2024-12-13 | End: 2024-12-13

## 2024-12-13 RX ORDER — METOPROLOL TARTRATE 25 MG/1
25-100 TABLET, FILM COATED ORAL
OUTPATIENT
Start: 2024-12-13

## 2024-12-13 RX ORDER — ACETAMINOPHEN 325 MG/1
975 TABLET ORAL ONCE
OUTPATIENT
Start: 2024-12-13 | End: 2024-12-13

## 2024-12-13 RX ORDER — DILTIAZEM HYDROCHLORIDE 5 MG/ML
10-15 INJECTION INTRAVENOUS
Status: DISCONTINUED | OUTPATIENT
Start: 2024-12-13 | End: 2024-12-13

## 2024-12-13 RX ORDER — IVABRADINE 5 MG/1
5-15 TABLET, FILM COATED ORAL
Status: DISCONTINUED | OUTPATIENT
Start: 2024-12-13 | End: 2024-12-13

## 2024-12-13 RX ORDER — ASPIRIN 81 MG/1
162 TABLET, CHEWABLE ORAL
OUTPATIENT
Start: 2024-12-13

## 2024-12-13 RX ORDER — IOPAMIDOL 755 MG/ML
110 INJECTION, SOLUTION INTRAVASCULAR ONCE
Status: COMPLETED | OUTPATIENT
Start: 2024-12-13 | End: 2024-12-13

## 2024-12-13 RX ORDER — DILTIAZEM HYDROCHLORIDE 30 MG/1
120 TABLET, FILM COATED ORAL
Status: DISCONTINUED | OUTPATIENT
Start: 2024-12-13 | End: 2024-12-13

## 2024-12-13 RX ORDER — FAMOTIDINE 20 MG/1
20 TABLET, FILM COATED ORAL
OUTPATIENT
Start: 2024-12-13

## 2024-12-13 RX ORDER — NITROGLYCERIN 0.4 MG/1
0.4 TABLET SUBLINGUAL
Status: DISCONTINUED | OUTPATIENT
Start: 2024-12-13 | End: 2024-12-13

## 2024-12-13 RX ORDER — METOPROLOL TARTRATE 1 MG/ML
5-20 INJECTION, SOLUTION INTRAVENOUS
Status: DISCONTINUED | OUTPATIENT
Start: 2024-12-13 | End: 2024-12-13

## 2024-12-13 RX ORDER — LIDOCAINE 40 MG/G
CREAM TOPICAL
OUTPATIENT
Start: 2024-12-13

## 2024-12-13 RX ORDER — LIDOCAINE 40 MG/G
CREAM TOPICAL
Status: DISCONTINUED | OUTPATIENT
Start: 2024-12-13 | End: 2024-12-13

## 2024-12-13 RX ORDER — CEFAZOLIN SODIUM 2 G/100ML
2 INJECTION, SOLUTION INTRAVENOUS SEE ADMIN INSTRUCTIONS
OUTPATIENT
Start: 2024-12-13

## 2024-12-13 RX ORDER — ASPIRIN 81 MG/1
81 TABLET, CHEWABLE ORAL
OUTPATIENT
Start: 2024-12-13

## 2024-12-13 RX ORDER — HEPARIN SOD,PORCINE/0.9 % NACL 30K/1000ML
INTRAVENOUS SOLUTION INTRAVENOUS
OUTPATIENT
Start: 2024-12-13

## 2024-12-13 RX ORDER — CHLORHEXIDINE GLUCONATE ORAL RINSE 1.2 MG/ML
10 SOLUTION DENTAL ONCE
OUTPATIENT
Start: 2024-12-13 | End: 2024-12-13

## 2024-12-13 RX ADMIN — ASPIRIN 325 MG: 325 TABLET, COATED ORAL at 09:33

## 2024-12-13 RX ADMIN — METOPROLOL TARTRATE 50 MG: 50 TABLET, FILM COATED ORAL at 12:37

## 2024-12-13 RX ADMIN — NITROGLYCERIN 0.4 MG: 0.4 TABLET SUBLINGUAL at 13:56

## 2024-12-13 RX ADMIN — IVABRADINE 10 MG: 5 TABLET, FILM COATED ORAL at 12:37

## 2024-12-13 RX ADMIN — ENOXAPARIN SODIUM 40 MG: 40 INJECTION SUBCUTANEOUS at 15:51

## 2024-12-13 RX ADMIN — ATORVASTATIN CALCIUM 40 MG: 40 TABLET, FILM COATED ORAL at 19:58

## 2024-12-13 RX ADMIN — IOPAMIDOL 110 ML: 755 INJECTION, SOLUTION INTRAVENOUS at 14:09

## 2024-12-13 NOTE — PROGRESS NOTES
[facility]    Hospitalist Progress Note    Interval History   Improving coordination. No dizziness. No nausea.  No cp/sob.      Assessment & Plan   Summary: Mundo Kern is a 49 year old male with PMH multiple prior embolic strokes of undetermined source, HLD, alcohol use, who was admitted on 12/9/2024 with a cerebellar stroke.    Cerebellar CVA, likely ESUS  Hx CVA ESUS  Hyperlipidemia  Cardiac mass on MARIA TERESA (see below)  History of L MCA stroke in 7/2023, known age-indeterminant right frontal and occipital strokes found on imaging in Apr 2024. Patient reports not taking his ASA prior to admission. Presents with loss of equilibrium with n/v x 2-3 days. No extremity weakness. Some coordination concerns. No vision changes. In ED AFVSS. WBC 13.1.  MRI w/ large acute vs subacute R cerebellar CVA w/ mild edema, no hemorrhagic transformation. MRA w/ likely occlusion of R superior cerebellar artery and R AICA is not visualized.  CT C/A/P notable for wedged shaped hypoattenuation of both kidneys concerning for pyelo vs renal infarction.  , TGs 162.  A1C is 5.3.    MARIA TERESA as below:  Left ventricular systolic function is normal.  The visual ejection fraction is 60-65%.  No regional wall motion abnormalities noted.  A mobile large linear echodensity (15mm) is noted attached to the left  ventricular aspect of the aortic valve prolapsing into the aortic root.  Differential includes large degenerative strand versus early fibroblastoma.  vegetation and clot less likely but not entirely excluded. Clinical  correlation advised.  There is no color Doppler evidence of an atrial shunt.  A contrast injection (Bubble Study) was performed that was negative for flow  across the interatrial septum.  Valsalva could not be performed. Abdominal pressure applied.  No thrombus is detected in the left atrial appendage.  Cardiac mass could be the source.  Cardiology consulted -->requested  cardiothoracic evaluation  "for possible surgery.  IE less likely (afebrile, normalized wbc count w/o intervention, normal inflamatory markers)  - Telemetry  - Q4h neuro checks  - Long term BP control < 130/80  - ASA 325mg  - PT/OT/SLP  - Atorvastatin 40mg daily.  - Follow neurology recs.  - HemOnc consulted for hypercoagulability  - Follow consultants recs.  - Cardiac surgery in \"couple of weeks\". Necessary w/up to be completed as inpatient.  - Will ask ID to weigh in regarding possibility of IE prior to the surgery (per d/w CTS)    Renal lesions: infarct vs pyelo. UA neg for infection. Denies irritative sxs. Elevated wbc cont at 15. Afebrile.    Tobacco use disorder  Smokes 1ppd. Declines NRT  - Encouraged cessation    Alcohol use  Drinks 6 pack of beer ~4x/ week. No significant withdrawal noted here  - CIWA  - Encouraged cessation       PT/OT: ordered  Diet: Regular Diet Adult Thin Liquids (level 0)    DVT Prophylaxis:Lovenox.  Velazquez Catheter: Not present  Lines: None     Cardiac Monitoring: ACTIVE order. Indication: Procedural area  Code Status: Full Code    Medically Ready for Discharge: likely Monday once w/up is complete. Please notify CTS prior to discharge.      Emily Yañez MD  Hospitalist Service  [facility]  Securely message with [employer] (more info)  Text page via [employer] Paging/Directory       Data reviewed today: I reviewed all new labs and imaging results over the last 24 hours.    Physical Exam   Temp: 98.7  F (37.1  C) Temp src: Oral BP: 116/73 Pulse: 84   Resp: 16 SpO2: 97 % O2 Device: None (Room air)    Vitals:    12/09/24 1539   Weight: 81.6 kg (180 lb)     Vital Signs with Ranges  Temp:  [98  F (36.7  C)-98.7  F (37.1  C)] 98.7  F (37.1  C)  Pulse:  [67-84] 84  Resp:  [16] 16  BP: ()/(62-94) 116/73  SpO2:  [96 %-99 %] 97 %  I/O last 3 completed shifts:  In: 120 [P.O.:120]  Out: -   O2 requirements: none    Constitutional: Male in NAD  HEENT: Eyes nonicteric, oral mucosa moist  Cardiovascular: " RRR, normal S1/2, no m/r/g  Respiratory: CTAB, no wheezing or crackles  Vascular: No LE pitting edema  GI: Normoactive bowel sounds, nontender, nondistended  Skin/Integumen: No rashes  Neuro/Psych: Appropriate affect and mood. A&Ox3, noted dysmetria on finger to nose on the right    Medications   Current Facility-Administered Medications   Medication Dose Route Frequency Provider Last Rate Last Admin    May take oral meds with a sip of water, the morning of MARIA TERESA procedure.   Does not apply Continuous PRN Brice Rahman MD         Current Facility-Administered Medications   Medication Dose Route Frequency Provider Last Rate Last Admin    aspirin (ASA) EC tablet 325 mg  325 mg Oral Daily Luis David MD   325 mg at 12/12/24 1036    Or    aspirin (ASA) chewable tablet 324 mg  324 mg Oral or NG Tube Daily Luis David MD   324 mg at 12/11/24 0818    Or    aspirin (ASA) Suppository 300 mg  300 mg Rectal Daily Luis David MD        atorvastatin (LIPITOR) tablet 40 mg  40 mg Oral or Feeding Tube QPM Luis David MD   40 mg at 12/12/24 2024    glycopyrrolate (ROBINUL) injection 0.1 mg  0.1 mg Intravenous Once Luis David MD        senna-docusate (SENOKOT-S/PERICOLACE) 8.6-50 MG per tablet 1-2 tablet  1-2 tablet Oral or NG Tube BID Luis David MD   1 tablet at 12/11/24 0818    sodium chloride (PF) 0.9% PF flush 3 mL  3 mL Intravenous Q8H Brice Rahman MD   3 mL at 12/13/24 0022    sodium chloride (PF) 0.9% PF flush 3 mL  3 mL Intracatheter Q8H Luis David MD   3 mL at 12/12/24 1616       Data   Recent Labs   Lab 12/12/24  1045 12/10/24  1015 12/10/24  0752 12/09/24  1705   WBC 8.6 15.3*  --  13.1*   HGB 16.6 16.6  --  17.6   MCV 88 88  --  87    288  --  323    137  --  136   POTASSIUM 3.8 3.4  --  3.9   CHLORIDE 104 104  --  103   CO2 24 21*  --  21*   BUN 10.5 11.0  --  10.7   CR 1.05 1.02  --   0.97   ANIONGAP 10 12  --  12   SOFI 9.1 8.9  --  9.4   * 156* 97 95   ALBUMIN  --   --   --  4.4   PROTTOTAL  --   --   --  7.0   BILITOTAL  --   --   --  1.0   ALKPHOS  --   --   --  88   ALT  --   --   --  24   AST  --   --   --  32       Imaging:   No results found for this or any previous visit (from the past 24 hours).

## 2024-12-13 NOTE — PROGRESS NOTES
Pt not in room   I talked to family and dr alcantar here  They had question about [id number]  the study then was incomplete but didn't clearly show clot etc but limited pictures    In 2024 ana shows strand on AV  I was able to look back to 2023 knowing what we saw on 2024 and I see the strand  The 2023 issue is that it was an incomplete study    Pt getting CT cor angio now  Assuming it is negative cardio doesn't need to see pt here  Likely asa or plavix post op if just strand removal and serial follow-up ana afterwards to look for regrowth    25 min visit today with family and dr alcantar  I will sign off   call prn

## 2024-12-13 NOTE — PLAN OF CARE
"/73 (BP Location: Left arm)   Pulse 84   Temp 98.7  F (37.1  C) (Oral)   Resp 16   Ht 1.803 m (5' 11\")   Wt 81.6 kg (180 lb)   SpO2 97%   BMI 25.10 kg/m      Patient name: Mundo Kern    Summary: Patient here with CVA, slightly weak on right side, some blurry vision on left upper quadrant, sent for coronary CT, moving with 1 and gaitbelt. Shower provided.     Robby Atkinson, RN  [facility]    "

## 2024-12-13 NOTE — PLAN OF CARE
Holly Beltran RN on 12/13/2024 [id number].    Reason for Admission: R cerebellar CVA.     Cognitive/Mentation: A/O x4.   Neuros/CMS: L field cut/blurry peripheral vision, RUE ataxic, RUE 4/5.   VS: Stable on RA.   Tele: SR.  GI/: Continent of b & b.   Pulmonary: LS clear.   Pain: denies.     Drains/Lines: R PIV SL.   Skin: Intact.  Activity: Assist x1 GB/W.   Diet: Regular with thin liquids. Takes pills whole with water.     Therapies recs: home with outpatient therapies.  Discharge: pending.     Aggression Spotlight Tool: green.

## 2024-12-13 NOTE — CONSULTS
[facility]    Infectious Disease Consultation     Date of Admission:  12/9/2024  Date of Consult (When I saw the patient): 12/13/24    Assessment & Plan   Mundo Kern is a 49 year old male who was admitted on 12/9/2024.     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    -Afebrile. Normal procal, CRP, and ESR.   -Leukocytosis on admission. Resolved without antibiotics. Likely reactive.   -Blood cultures pending.   -No recent travel or animal exposures.       Recommendations:  Follow blood culture to rule out possible infectious endocarditis.   Obtain cultures during surgery.   Given that he is afebrile with normal ESR, CRP and procalcitonin, this is unlikely to be bacteria endocarditis.     Patient and plan discussed with Dr. Cardona.       Marcia Braga PA-C    Reason for Consult   Reason for consult: Asked to evaluate this patient for cardiac mass. surgery planned by SARAH rowell in regarding possibility if IE .    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 MRI of the brain showed a large acute vs subacute R cerebellar CVA w/ mild edema, no hemorrhagic transformation. MRA revealed a likely occlusion of the right superior cerebellar artery and right AICA was not visualized.  CT of the chest/abdomen/pelvis was significant for possible renal infarction  vs pyelonephritis (UA was negative). MARIA TERESA was significant for a mobile large linear echodensity (15mm) attached to the left ventricular aspect of the aortic valve prolapsing into the aortic root. Cardiothoracic Surgery consulted on him and are planning for minimally invasive aortic valve debridement next week.     He has been afebrile. No chills. He did have leukocytosis at the time of admission which has resolved without any antibiotics. CRP, ESR, and procal are all normal.     Past Medical History   I have reviewed this patient's medical history and updated it with pertinent information if needed.   Past Medical History:   Diagnosis Date    Cerebrovascular accident (H)     Tobacco use disorder        Past Surgical History   I have reviewed this patient's surgical history and updated it with pertinent information if needed.  Past Surgical History:   Procedure Laterality Date    TRANSESOPHAGEAL ECHOCARDIOGRAM INTRAOPERATIVE N/A 12/11/2024    Procedure: Transesophageal echocardiogram intraoperative;  Surgeon: GENERIC ANESTHESIA PROVIDER;  Location:  OR       Prior to Admission Medications   None     Allergies   No Known Allergies    Immunization History   Immunization History   Administered Date(s) Administered    COVID-19 MONOVALENT 12+ (Pfizer) 04/27/2021, 05/18/2021       Social History   I have reviewed this patient's social history and updated it with pertinent information if needed. Mundo Kern  reports that he has been smoking cigarettes. He does not have any smokeless tobacco history on file. He reports current alcohol use.    Family History   I have reviewed this patient's family history and updated it with pertinent information if needed.   No family history on file.    Review of Systems   The 10 point Review of Systems is negative    Physical Exam   Temp: 99.1  F (37.3  C) Temp src: Oral BP: 129/88 Pulse: 72   Resp: 16 SpO2: 96 % O2 Device: None (Room air)    Vital Signs with Ranges  Temp:  [98  F  (36.7  C)-99.1  F (37.3  C)] 99.1  F (37.3  C)  Pulse:  [67-87] 72  Resp:  [16] 16  BP: ()/(62-94) 129/88  SpO2:  [96 %-99 %] 96 %  180 lbs 0 oz  Body mass index is 25.1 kg/m .    GENERAL APPEARANCE:  awake  EYES: Eyes grossly normal to inspection  NECK: no adenopathy  RESP: lungs clear   CV: regular rates and rhythm  ABDOMEN: soft, nontender  MS: extremities normal  SKIN: no suspicious lesions or rashes        Data   All laboratory data reviewed    Component      Latest Ref [facility] 12/9/2024  5:05 PM 12/10/2024  10:15 AM 12/13/2024  8:51 AM   WBC      4.0 - 11.0 10e3/uL 13.1 (H)  15.3 (H)  8.6    RBC Count      4.40 - 5.90 10e6/uL 5.75  5.23  5.23    Hemoglobin      13.3 - 17.7 g/dL 17.6  16.6  16.2    Hematocrit      40.0 - 53.0 % 49.9  46.0  45.8    MCV      78 - 100 fL 87  88  88    MCH      26.5 - 33.0 pg 30.6  31.7  31.0    MCHC      31.5 - 36.5 g/dL 35.3  36.1  35.4    RDW      10.0 - 15.0 % 12.7  12.6  12.6    Platelet Count      150 - 450 10e3/uL 323  288  268    % Neutrophils      % 67      % Lymphocytes      % 23      % Monocytes      % 9      % Eosinophils      % 1      % Basophils      % 1      % Immature Granulocytes      % 0      NRBCs per 100 WBC      <1 /100 0      Absolute Neutrophils      1.6 - 8.3 10e3/uL 8.8 (H)      Absolute Lymphocytes      0.8 - 5.3 10e3/uL 3.0      Absolute Monocytes      0.0 - 1.3 10e3/uL 1.2      Absolute Eosinophils      0.0 - 0.7 10e3/uL 0.1      Absolute Basophils      0.0 - 0.2 10e3/uL 0.1      Absolute Immature Granulocytes      <=0.4 10e3/uL 0.1      Absolute NRBCs      10e3/uL 0.0         Component      Latest Ref [facility] 12/9/2024  5:05 PM 12/13/2024  8:51 AM   Sodium      135 - 145 mmol/L 136  141    Potassium      3.4 - 5.3 mmol/L 3.9  4.0    Carbon Dioxide (CO2)      22 - 29 mmol/L 21 (L)  22    Anion Gap      7 - 15 mmol/L 12  12    Urea Nitrogen      6.0 - 20.0 mg/dL 10.7  8.0    Creatinine      0.67 - 1.17 mg/dL 0.97  0.92    GFR Estimate      >60 mL/min/1.73m2  >90  >90    Calcium      8.8 - 10.4 mg/dL 9.4  8.8    Chloride      98 - 107 mmol/L 103  107    Glucose      70 - 99 mg/dL 95  101 (H)    Alkaline Phosphatase      40 - 150 U/L 88  82    AST      0 - 45 U/L 32  28    ALT      0 - 70 U/L 24  24    Protein Total      6.4 - 8.3 g/dL 7.0  6.3 (L)    Albumin      3.5 - 5.2 g/dL 4.4  4.0    Bilirubin Total      <=1.2 mg/dL 1.0  0.7    Bilirubin Direct      0.00 - 0.30 mg/dL  <0.20       Component      Latest Ref Rng 12/9/2024  5:05 PM 12/13/2024  8:51 AM   Estimated Average Glucose      <117 mg/dL 105     Hemoglobin A1C      <5.7 % 5.3     Procalcitonin      <0.50 ng/mL  0.10    Sed Rate      0 - 15 mm/hr  4    CRP Inflammation      <5.00 mg/L  3.52        Blood culture 12/13 pending.      CT CHEST/ABDOMEN/PELVIS W CONTRAST 12/10/2024 12:36 PM     CLINICAL HISTORY: recurrent stroke r/o underlying malignancy     TECHNIQUE: CT scan of the chest, abdomen, and pelvis was performed  without IV contrast. Multiplanar reformats were obtained. Dose  reduction techniques were used.   CONTRAST: None.     COMPARISON: CT abdomen and pelvis performed on 7/19/2005     FINDINGS:   LUNGS AND PLEURA: No pleural effusion or pneumothorax is present.  Subsegmental atelectasis is seen in the lung bases. Calcified  granulomas present in the right middle lobe. Punctate calcified  granuloma is noted within the left lower lobe.     MEDIASTINUM/AXILLAE: No lymphadenopathy. No thoracic aortic aneurysms.     CORONARY ARTERY CALCIFICATION: Mild.     HEPATOBILIARY: No significant mass or bile duct dilatation. No  calcified gallstones.      PANCREAS: No significant mass, duct dilatation, or inflammatory  change.     SPLEEN: Normal size.     ADRENAL GLANDS: No significant nodules.     KIDNEYS/BLADDER: No hydronephrosis is present. Wedge-shaped area of  hypoattenuation is seen along the inferior pole of the right kidney  (series 2, image 74). Similar appearing focus is also seen along the  inferior pole  of the left kidney (series 6, image 58).     BOWEL: Diverticulosis in the colon. No acute inflammatory change. No  obstruction.      LYMPH NODES: No lymphadenopathy.     VASCULATURE: No abdominal aortic aneurysm.     PELVIC ORGANS: No pelvic masses.     OTHER: None.     MUSCULOSKELETAL: Mild degenerative changes are seen in the spine.  Sclerotic changes are seen along the superior and inferior endplates  of T8.                                                                      IMPRESSION:  1.  Wedge-shaped areas of hypoattenuation are noted along the inferior  poles of both kidneys. Findings can be seen in pyelonephritis although  no significant surrounding perinephric stranding is evident. Recommend  correlation with urinalysis. Renal infarction is also in the  differential diagnosis.      EXAM: MR BRAIN W/O and W CONTRAST, MRA NECK (CAROTIDS) W/O and W CONTRAST, MRA BRAIN ([place]) W/O CONTRAST  LOCATION: [facility]  DATE/TIME: 12/9/2024 10:20 PM CST     INDICATION: dizziness, dysmetria RUE, h o stroke  COMPARISON: None.  CONTRAST: 10mL Gadavist   TECHNIQUE:   1) Routine multiplanar multisequence head MRI with and without intravenous contrast.  2) 3D time-of-flight head MRA without intravenous contrast.  3) Neck MRA without and with IV contrast. Stenosis measurements made according to NASCET criteria unless otherwise specified.     FINDINGS:  HEAD MRI:  INTRACRANIAL CONTENTS: Large acute versus subacute right paramedian cerebellar hemisphere infarct with mild associated mass effect. No hemorrhagic transformation. Right PCA territory encephalomalacia with cortical laminar necrosis from prior infarct.   Additional small region of encephalomalacia in the right frontal operculum, also likely secondary to remote infarct. No mass, acute hemorrhage, or extra-axial fluid collections. Normal brain parenchymal signal. Normal ventricles and sulci. Normal   Position of the cerebellar  tonsils. No pathologic contrast enhancement.     SELLA: No abnormality accounting for technique.     OSSEOUS STRUCTURES/SOFT TISSUES: Normal marrow signal.      ORBITS: No abnormality accounting for technique.      SINUSES/MASTOIDS: No paranasal sinus mucosal disease. No middle ear or mastoid effusion.      HEAD MRA:   ANTERIOR CIRCULATION: No stenosis/occlusion, aneurysm, or high flow vascular malformation. Standard [place] anatomy.     POSTERIOR CIRCULATION: No stenosis/occlusion of the major arteries, aneurysm, or high flow vascular malformation. The right superior cerebellar artery is likely occluded and the right AICA is not visualized. Balanced vertebral arteries supply a normal   basilar artery.      NECK MRA:   RIGHT CAROTID: No measurable stenosis or dissection.     LEFT CAROTID: No measurable stenosis or dissection.     VERTEBRAL ARTERIES: No focal stenosis or dissection. Balanced vertebral arteries.     AORTIC ARCH: Classic aortic arch anatomy with no significant stenosis at the origin of the great vessels.                                                                      IMPRESSION:  HEAD MRI:   1.  Large acute versus subacute right cerebellar hemisphere infarct with mild associated edema. No significant posterior fossa mass effect. No hemorrhagic transformation.   2.  Remote right PCA territory infarct and likely small right frontal operculum infarct.     HEAD MRA:   1.  No large vessel occlusion or hemodynamically significant stenosis.  2.  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      NECK MRA:  1.  Normal neck MRA.        Complete MARIA TERESA Adult 12/10/24.  ____________________________________________________________________________  Interpretation Summary     Left ventricular systolic function is normal.  The visual ejection fraction is 60-65%.  No regional wall motion abnormalities noted.  A mobile large linear echodensity (15mm) is noted  attached to the left  ventricular aspect of the aortic valve prolapsing into the aortic root.  Differential includes large degenerative strand versus early fibroblastoma.  vegetation and clot less likely but not entirely excluded. Clinical  correlation advised.  There is no color Doppler evidence of an atrial shunt.  A contrast injection (Bubble Study) was performed that was negative for flow  across the interatrial septum.  Valsalva could not be performed. Abdominal pressure applied.  No thrombus is detected in the left atrial appendage.

## 2024-12-13 NOTE — PROGRESS NOTES
Reason for Admission: R.CVA    Cognitive/Mentation: A/Ox 4  Neuros/CMS: Intact ex [place]-cut, RUE ataxic.  VS: Stable RA.   Tele: SR.  /GI: Continent.  Pulmonary: LS Clear.  Pain: Mild HA refused pain med.   Drains/Lines: PIV SL  Skin: Intact  Activity: Assist x 1 with GBW.  Diet: Reg with thin liquids. Takes pills whole with water.   Discharge: Pend    Aggression Stoplight Tool: Green

## 2024-12-13 NOTE — CONSULTS
CARDIOTHORACIC SURGERY CONSULT NOTE  12/13/2024      Reason for Consult: Aortic valve mass      ASSESSMENT/PLAN:   Mundo Kern is a 49 year old male with a past medical history of acute ischemic CVA with multifocal L MCA infarct 07/2023 and subacute right PCA infarct 04/2024 both of unconfirmed etiology, tobacco abuse, and heavy alcohol use who was admitted to [facility] 12/09 with several day history of nausea and vomiting with dysequilibrium. Brain MRI 12/09 with large acute to subacute right paramedian cerebellar infarct in addition to old previously identified infarcts; MRA head and neck without significant large vessel occlusion or stenoses. Given strong suspicion for embolic etiology for CVAs, underwent MARIA TERESA 12/11 which demonstrated large (15 mm) linear mass attached to the ventricular surface of the aortic valve with differential including large Lambl's excrescence vs fibroelastoma or less likely thrombus or vegetation. No significant aortic regurgitation or valve abnormality noted. Preserved biventricular function, no obvious PFO, mild MR/TR. CV surgery consulted for consideration of surgical aortic valve debridement.    - Evaluate for surgical aortic valve debridement, possible AVR. Plan for minimally invasive approach via thoracotomy.   - Ischemic eval with CT coronary angiogram, results pending  - Clinical presentation not suggestive of infective endocarditis, but should be ruled out. WBC elevated on admission, now normalized. Afebrile. Check blood culture, CRP, ESR, procal, consider ID consult pending results.   - Neurology following. Reviewed timing of surgery and need for systemic anticoagulation for CBP. Given location and size of CVA, neurology recommending waiting on surgery/systemic anticoagulation for 2-3 weeks.     - Needs dental clearance. Recent root canal 11/30/2024 per patient. CT dental ordered.   - Encourage tobacco cessation. Patient in agreement.   - Noted significant alcohol intake history.  On CIWA protocol. Monitor for withdrawal. Encourage cessation.  - Other cares per primary team. CV surgery will follow.   - Thank you for the opportunity to participate in the care of this patient.        Patient and plan discussed with attending, Dr. Anne.      Marilyn Miller PA-C  Cardiothoracic Surgery  Pager [phone]        ________________________________________________________________________________________________    HPI:   Mundo Kern is a 49 year old male with a past medical history of acute ischemic CVA with multifocal L MCA infarct 07/2023 and subacute right PCA infarct 04/2024 both of unconfirmed etiology, tobacco abuse, and heavy alcohol use who was admitted to [facility] 12/09 with several day history of nausea and vomiting with dysequilibrium and balance issues. Brain MRI 12/09 with large acute to subacute right paramedian cerebellar infarct in addition to old previously identified infarcts; MRA head and neck without significant large vessel occlusion or stenoses. Given strong suspicion for embolic etiology for CVAs, underwent MARIA TERESA 12/11 which demonstrated large (15 mm) linear mass attached to the ventricular surface of the aortic valve with differential including large Lambl's excrescence vs fibroelastoma or less likely thrombus or vegetation. No significant aortic regurgitation or valve abnormality noted. Preserved biventricular function, no obvious PFO, mild MR/TR. CV surgery consulted for consideration of surgical aortic valve debridement..    Patient currently complains of without complaint. He otherwise denies any chest pain, SOB, palpitations, LE edema.     PMH:  Past Medical History:   Diagnosis Date    Cerebrovascular accident (H)     Tobacco use disorder        PSH:  Past Surgical History:   Procedure Laterality Date    TRANSESOPHAGEAL ECHOCARDIOGRAM INTRAOPERATIVE N/A 12/11/2024    Procedure: Transesophageal echocardiogram intraoperative;  Surgeon: GENERIC ANESTHESIA PROVIDER;   Location: [facility] OR       FH:  No family history on file.    SH:  Social History     Socioeconomic History    Marital status: Single   Tobacco Use    Smoking status: Every Day     Current packs/day: 1.00     Types: Cigarettes   Substance and Sexual Activity    Alcohol use: Yes     Comment: 6 pack of beer 3-4x per week     Social Drivers of Health     Financial Resource Strain: Low Risk  (12/10/2024)    Financial Resource Strain     Within the past 12 months, have you or your family members you live with been unable to get utilities (heat, electricity) when it was really needed?: No   Food Insecurity: Low Risk  (12/10/2024)    Food Insecurity     Within the past 12 months, did you worry that your food would run out before you got money to buy more?: No     Within the past 12 months, did the food you bought just not last and you didn t have money to get more?: No   Transportation Needs: Low Risk  (12/10/2024)    Transportation Needs     Within the past 12 months, has lack of transportation kept you from medical appointments, getting your medicines, non-medical meetings or appointments, work, or from getting things that you need?: No    [facility]    Social Connections   Interpersonal Safety: Low Risk  (12/10/2024)    Interpersonal Safety     Do you feel physically and emotionally safe where you currently live?: Yes     Within the past 12 months, have you been hit, slapped, kicked or otherwise physically hurt by someone?: No     Within the past 12 months, have you been humiliated or emotionally abused in other ways by your partner or ex-partner?: No   Housing Stability: Low Risk  (12/10/2024)    Housing Stability     Do you have housing? : Yes     Are you worried about losing your housing?: No       Home Meds:  No medications prior to admission.     Allergies:  No Known Allergies  ROS: 10 point ROS neg other than the symptoms noted above in the HPI.      Physical Exam:  Temp:   [98  F (36.7  C)-99.1  F (37.3  C)] 99.1  F (37.3  C)  Pulse:  [57-87] 57  Resp:  [16] 16  BP: ()/(62-94) 117/81  SpO2:  [96 %-99 %] 96 %  Gen: NAD, resting in bed  CV: RRR, S1S2 normal, no murmurs, rubs, or gallops. JVP not elevated  Pulm: clear to auscultation bilaterally, no rhonchi or wheezes  Abd: soft, non-tender, no guarding, +BS  Ext: no lower extremity edema  Neuro: grossly normal  Psych: calm, cooperative       Labs:  ABG No lab results found in last 7 days.  CBC  Recent Labs   Lab 12/13/24  0851 12/12/24  1045 12/10/24  1015 12/09/24  1705   WBC 8.6 8.6 15.3* 13.1*   HGB 16.2 16.6 16.6 17.6    290 288 323     BMP  Recent Labs   Lab 12/13/24  0851 12/12/24  1045 12/10/24  1015 12/10/24  0752 12/09/24  1705    138 137  --  136   POTASSIUM 4.0 3.8 3.4  --  3.9   CHLORIDE 107 104 104  --  103   CO2 22 24 21*  --  21*   BUN 8.0 10.5 11.0  --  10.7   CR 0.92 1.05 1.02  --  0.97   * 101* 156* 97 95     LFT  Recent Labs   Lab 12/13/24  0851 12/09/24  1705   AST 28 32   ALT 24 24   ALKPHOS 82 88   BILITOTAL 0.7 1.0   ALBUMIN 4.0 4.4   INR 1.03  --      PancreasNo lab results found in last 7 days.    Imaging:  No results found for this or any previous visit (from the past 24 hours).

## 2024-12-13 NOTE — PROGRESS NOTES
[facility]    Vascular Neurology Progress Note    Interval History     Mundo was seen and examined this AM, dysmetria better, denies any new issues overnight.     Hospital Course     Chief complaint: Dizziness (/) and Vomiting    49-year-old male with past medical history significant for hyperlipidemia, Tobaccoism, Et0h abuse, medication noncompliance and ischemic strokes who presented with 4 days of gait instability, disequilibrium, nausea/vomiting and diarrhea. On admission, /94, MRI brain showed an acute infarct of right cerebellum mainly involving right SCA territory. MRA head and neck revealed occlusion of right superior cerebellar artery. CBC showing leukocytosis, A1c at goal, .  CT CAP with contrast showing wedge-shaped infarcts in both kidneys concerning for a systemic embolic etiology. MARIA TERESA showed a mobile large linear echodensity (15mm) is noted attached to the aortic valve of unknown etiology, spoke to cardiology who felt that this could possibly be a fibroelastoma vs Lambl's excrescence. Need CV surgery for potential surgical excision and biopsy of the valvular mass.    Assessment and Plan     Acute ischemic stroke of right cerebellum likely cardio embolic  Aortic valve echodensity   Wedge-shaped infarcts of bilateral kidneys  History of ischemic strokes  Medication noncompliance  Tobaccoism   Et0h abuse        49-year-old male with past medical history significant for hyperlipidemia, Tobaccoism, Et0h abuse, medication noncompliance and ischemic strokes who presented with 4 days of gait instability, disequilibrium, nausea/vomiting and diarrhea. On admission, /94, MRI brain showed an acute infarct of right cerebellum mainly involving right SCA territory. MRA head and neck revealed occlusion of right superior cerebellar artery. CBC showing leukocytosis, A1c at goal, .  Etiology of recurrent ischemic strokes is likely ESUS, additional contributors  "include medication noncompliance, smoking/Et0h abuse and some vascular risk factors.  Given relatively young age he has had an extensive stroke in young work up, CT chest abdomen pelvis showed wedge-shaped infarcts in both kidneys further solidifying our hypothesis of systemic embolic etiology. MARIA TERESA showed a mobile large linear echodensity (15mm) is noted attached to the aortic valve of unknown etiology, spoke to cardiology who felt that this could possibly be a fibroelastoma vs Lambl's excrescence which could potentially explain his recurrent cardio embolic stroke and renal infarcts, either way he would need CV surgery for potential surgical excision and biopsy of the valvular mass, would recommend holding off CV surgery for 2-3 wks given recent ischemic stroke and CV surgery requiring full anticoagulation.         DVT Prophylaxis: ok for chemical DVT prophylaxis      Recommendations     - Cardiology ob, appreciate recs  - CV surgery eval, would recommend holding off CV surgery for another 2-3 wks given recent stroke  - Neurochecks and Vital Signs every 4 hours   - Long-term blood pressure goal <130/80  - Daily aspirin 325 mg for secondary stroke prevention  - Statin: atorvastatin 40mg daily  - Telemetry, EKG  - Bedside Glucose Monitoring  - PT/OT/SLP/Rehab  - Stroke Education  - Smoking Cessation  - Euthermia, Euglycemia    Patient Follow-up    - in 6-8 weeks with general neurology or stroke [facility] ([phone])    We will continue to follow.       The Stroke Staff is Dr. Redman.    Brice Rahman MD  Vascular Neurology Fellow    To page me or covering stroke neurology team member, click here: [facility]COM  Choose \"On Call\" tab at top, then select \"NEUROLOGY/ALL SITES\" from middle drop-down box, press Enter, then look for \"stroke\" or \"telestroke\" for your site.    Physical Examination     Temp: 98.7  F (37.1  C) Temp src: Oral BP: 116/73 Pulse: 84   Resp: 16 SpO2: 97 % O2 Device: None (Room air)  "     Neurologic    Mental Status:  alert, oriented x 3, follows commands, no aphasia, naming and repetition normal  Cranial Nerves:  visual fields : Left superior temporal field defect, facial movements symmetric, hearing not formally tested but intact to conversation, no dysarthria  Motor:  normal muscle tone and bulk, no abnormal movements, able to move all limbs spontaneously, no pronator drift, strength full in all 4 extremities.  Slowed rapid movements on right  Coordination: Mild Dysmetria on finger-to-nose on right  Station/Gait:  deferred    Stroke Scales     Stroke Scales     NIHSS  1a. Level of Consciousness  0   1b. LOC Questions  0   1c. LOC Commands  0   2.   Best Gaze  0   3.   Visual  1   4.   Facial Palsy  0   5a. Motor Arm, Left  0   5b. Motor Arm, Right  0   6a. Motor Leg, Left  0   6b. Motor Leg, right  0   7.   Limb Ataxia  1   8.   Sensory  0   9.   Best Language  0   10. Dysarthria  0   11. Extinction and Inattention   0   Total  2           Imaging/Labs   (Bolded imaging and labs new and/or personally reviewed or re-reviewed by me today)  MRI/Head CT Large right cerebellar hemisphere infarct, no hemorrhage, no mass effect   Intracranial Vasculature No large vessel occlusion; likely right superior cerebellar artery occlusion. Could not visualize right AICA   Cervical Vasculature Normal      Echocardiogram Left ventricular systolic function is normal.  The visual ejection fraction is 60-65%.  No regional wall motion abnormalities noted.  A mobile large linear echodensity (15mm) is noted attached to the left  ventricular aspect of the aortic valve prolapsing into the aortic root.  Differential includes large degenerative strand versus early fibroblastoma.  vegetation and clot less likely but not entirely excluded. Clinical  correlation advised.  There is no color Doppler evidence of an atrial shunt.  A contrast injection (Bubble Study) was performed that was negative for flow  across the  interatrial septum.  Valsalva could not be performed. Abdominal pressure applied.  No thrombus is detected in the left atrial appendage.   EKG/Telemetry Normal sinus rhythm   Other Testing Not Applicable       LDL  12/9/2024: 145 mg/dL   A1C  12/9/2024: 5.3 %   Troponin 12/9/2024: <6 ng/L           Other labs reviewed by me today:

## 2024-12-14 ENCOUNTER — APPOINTMENT (OUTPATIENT)
Dept: PHYSICAL THERAPY | Facility: CLINIC | Age: 49
DRG: 065 | End: 2024-12-14
Payer: COMMERCIAL

## 2024-12-14 VITALS
WEIGHT: 180 LBS | HEIGHT: 71 IN | SYSTOLIC BLOOD PRESSURE: 109 MMHG | RESPIRATION RATE: 10 BRPM | OXYGEN SATURATION: 96 % | TEMPERATURE: 98.4 F | BODY MASS INDEX: 25.2 KG/M2 | DIASTOLIC BLOOD PRESSURE: 69 MMHG | HEART RATE: 86 BPM

## 2024-12-14 PROCEDURE — 99239 HOSP IP/OBS DSCHRG MGMT >30: CPT | Performed by: INTERNAL MEDICINE

## 2024-12-14 PROCEDURE — 97112 NEUROMUSCULAR REEDUCATION: CPT | Mod: GP

## 2024-12-14 PROCEDURE — 97116 GAIT TRAINING THERAPY: CPT | Mod: GP

## 2024-12-14 PROCEDURE — 99231 SBSQ HOSP IP/OBS SF/LOW 25: CPT | Mod: GC | Performed by: PSYCHIATRY & NEUROLOGY

## 2024-12-14 PROCEDURE — 250N000013 HC RX MED GY IP 250 OP 250 PS 637: Performed by: INTERNAL MEDICINE

## 2024-12-14 RX ORDER — ASPIRIN 325 MG
325 TABLET, DELAYED RELEASE (ENTERIC COATED) ORAL DAILY
Qty: 30 TABLET | Refills: 0 | Status: SHIPPED | OUTPATIENT
Start: 2024-12-15 | End: 2024-12-14

## 2024-12-14 RX ORDER — ATORVASTATIN CALCIUM 40 MG/1
40 TABLET, FILM COATED ORAL EVERY EVENING
Qty: 30 TABLET | Refills: 0 | Status: SHIPPED | OUTPATIENT
Start: 2024-12-14 | End: 2024-12-14

## 2024-12-14 RX ORDER — ATORVASTATIN CALCIUM 40 MG/1
40 TABLET, FILM COATED ORAL EVERY EVENING
Qty: 30 TABLET | Refills: 0 | Status: SHIPPED | OUTPATIENT
Start: 2024-12-14

## 2024-12-14 RX ORDER — ASPIRIN 325 MG
325 TABLET, DELAYED RELEASE (ENTERIC COATED) ORAL DAILY
Qty: 30 TABLET | Refills: 0 | Status: SHIPPED | OUTPATIENT
Start: 2024-12-15

## 2024-12-14 RX ADMIN — ASPIRIN 325 MG: 325 TABLET, COATED ORAL at 08:26

## 2024-12-14 ASSESSMENT — ACTIVITIES OF DAILY LIVING (ADL)
ADLS_ACUITY_SCORE: 35

## 2024-12-14 NOTE — PROGRESS NOTES
Reason for Admission: R.CVA    Cognitive/Mentation: A/Ox 4  Neuros/CMS: Intact ex L.field-cut, RUE lil slow finger-nose.  VS: Stable RA.   Tele: SR.  /GI: Continent.  Pulmonary: LS Clear.  Pain: Denies  Drains/Lines: 2 PIV SL  Skin: Intact  Activity: Assist x 1 with GBW.  Diet: Reg with thin liquids. Takes pills whole with water.   Discharge: Pend    Aggression Stoplight Tool: Green

## 2024-12-14 NOTE — PLAN OF CARE
Goal Outcome Evaluation:      Plan of Care Reviewed With: patient    Overall Patient Progress: improvingOverall Patient Progress: improving    Patient is here with Rt cerebellum infract , A&Ox4.VSS ,Neuro's intact except BV in Lt side , slow finget to nose in RUE improving.On Regular diet.Tele SB  . Up with one ,GB and walker voided adequately , plan to have Aortic valve surgery in 2 weeks , possible discharge home

## 2024-12-14 NOTE — PROGRESS NOTES
[facility]    Vascular Neurology Progress Note    Interval History     Mundo was seen and examined this AM, dysmetria better, denies any new issues overnight.     Hospital Course     Chief complaint: Dizziness (/) and Vomiting    49-year-old male with past medical history significant for hyperlipidemia, Tobaccoism, Et0h abuse, medication noncompliance and ischemic strokes who presented with 4 days of gait instability, disequilibrium, nausea/vomiting and diarrhea. On admission, /94, MRI brain showed an acute infarct of right cerebellum mainly involving right SCA territory. MRA head and neck revealed occlusion of right superior cerebellar artery. CBC showing leukocytosis, A1c at goal, .  CT CAP with contrast showing wedge-shaped infarcts in both kidneys concerning for a systemic embolic etiology. MARIA TERESA showed a mobile large linear echodensity (15mm) is noted attached to the aortic valve of unknown etiology, spoke to cardiology who felt that this could possibly be a fibroelastoma vs Lambl's excrescence. Need CV surgery for potential surgical excision and biopsy of the valvular mass.    Assessment and Plan     Acute ischemic stroke of right cerebellum likely ESUS/cardioembolic  Aortic valve echodensity   Wedge-shaped infarcts of bilateral kidneys  History of ischemic strokes  Medication noncompliance  Tobaccoism   Et0h abuse        49-year-old male with past medical history significant for hyperlipidemia, Tobaccoism, Et0h abuse, medication noncompliance and ischemic strokes who presented with 4 days of gait instability, disequilibrium, nausea/vomiting and diarrhea. On admission, /94, MRI brain showed an acute infarct of right cerebellum mainly involving right SCA territory. MRA head and neck revealed occlusion of right superior cerebellar artery. CBC showing leukocytosis, A1c at goal, .  Etiology of recurrent ischemic strokes is likely ESUS p, additional  "contributors include medication noncompliance, smoking/Et0h abuse and some vascular risk factors.  Given relatively young age he has had an extensive stroke in young work up, CT chest abdomen pelvis showed wedge-shaped infarcts in both kidneys further solidifying our hypothesis of systemic embolic etiology. MARIA TERESA showed a mobile large linear echodensity (15mm) attached to the aortic valve of unknown etiology, spoke to cardiology who felt that this could possibly be a fibroelastoma vs Lambl's excrescence which could potentially explain his recurrent cardio embolic stroke and renal infarcts, either way he would need CV surgery for potential surgical excision and biopsy of the valvular mass, would recommend holding off CV surgery for 2-3 wks given recent ischemic stroke and CV surgery requiring full anticoagulation to minimize the risk of hemorrhagic conversion and any procedural/post procedural strokes.         DVT Prophylaxis: ok for chemical DVT prophylaxis      Recommendations     - Cardiology ob, appreciate recs  - CV surgery eval, would recommend holding off CV surgery for 2-3 wks given recent stroke  - Neurochecks and Vital Signs every 4 hours   - Long-term blood pressure goal <130/80  - Daily aspirin 325 mg for secondary stroke prevention  - Statin: atorvastatin 40mg daily  - Telemetry, EKG  - Bedside Glucose Monitoring  - PT/OT/SLP/Rehab  - Stroke Education  - Smoking Cessation  - Euthermia, Euglycemia    Patient Follow-up    - in 6-8 weeks with general neurology or stroke [facility] ([phone])    We will continue to follow.       The Stroke Staff is Dr. Redman.    Brice Rahman MD  Vascular Neurology Fellow    To page me or covering stroke neurology team member, click here: AMCOM  Choose \"On Call\" tab at top, then select \"NEUROLOGY/ALL SITES\" from middle drop-down box, press Enter, then look for \"stroke\" or \"telestroke\" for your site.    Physical Examination     Temp: 98.4  F (36.9  C) Temp src: Oral " BP: 109/69 Pulse: 86   Resp: 10 SpO2: 96 % O2 Device: None (Room air)      Neurologic    Mental Status:  alert, oriented x 3, follows commands, no aphasia, naming and repetition normal  Cranial Nerves:  visual fields : Left superior temporal field defect, facial movements symmetric, hearing not formally tested but intact to conversation, no dysarthria  Motor:  normal muscle tone and bulk, no abnormal movements, able to move all limbs spontaneously, no pronator drift, strength full in all 4 extremities.  Slowed rapid movements on right  Coordination: Mild Dysmetria on finger-to-nose on right  Station/Gait:  deferred    Stroke Scales     Stroke Scales     NIHSS  1a. Level of Consciousness  0   1b. LOC Questions  0   1c. LOC Commands  0   2.   Best Gaze  0   3.   Visual  1   4.   Facial Palsy  0   5a. Motor Arm, Left  0   5b. Motor Arm, Right  0   6a. Motor Leg, Left  0   6b. Motor Leg, right  0   7.   Limb Ataxia  1   8.   Sensory  0   9.   Best Language  0   10. Dysarthria  0   11. Extinction and Inattention   0   Total  2           Imaging/Labs   (Bolded imaging and labs new and/or personally reviewed or re-reviewed by me today)  MRI/Head CT Large right cerebellar hemisphere infarct, no hemorrhage, no mass effect   Intracranial Vasculature No large vessel occlusion; likely right superior cerebellar artery occlusion. Could not visualize right AICA   Cervical Vasculature Normal      Echocardiogram Left ventricular systolic function is normal.  The visual ejection fraction is 60-65%.  No regional wall motion abnormalities noted.  A mobile large linear echodensity (15mm) is noted attached to the left  ventricular aspect of the aortic valve prolapsing into the aortic root.  Differential includes large degenerative strand versus early fibroblastoma.  vegetation and clot less likely but not entirely excluded. Clinical  correlation advised.  There is no color Doppler evidence of an atrial shunt.  A contrast injection  (Bubble Study) was performed that was negative for flow  across the interatrial septum.  Valsalva could not be performed. Abdominal pressure applied.  No thrombus is detected in the left atrial appendage.   EKG/Telemetry Normal sinus rhythm   Other Testing Not Applicable       LDL  12/9/2024: 145 mg/dL   A1C  12/9/2024: 5.3 %   Troponin 12/9/2024: <6 ng/L           Other labs reviewed by me today:

## 2024-12-14 NOTE — PLAN OF CARE
Physical Therapy Discharge Summary    Reason for therapy discharge:    Discharged to home with outpatient therapy.    Progress towards therapy goal(s). See goals on Care Plan in [facility] electronic health record for goal details.  Goals partially met.  Barriers to achieving goals:   discharge from facility.    Therapy recommendation(s):    Continued therapy is recommended.  Rationale/Recommendations:  Pt below baseline with functional mobility. Pt limited by decreased balance and coordination, activity tolerance, and weakness. FGA score at 22/30 which puts him at an increased risk for falls. pt will benefit from outpatient physical therapy in order to adress safety with balance pt highly motivated to return to work and home. Recommend walker use for home. pt has good support with girlfriend and mother. pt stated he is able to stay with mother as needed as she lives in the duplex below.

## 2024-12-14 NOTE — PROGRESS NOTES
CV Surgery Brief Progress Note    Mundo Kern is a 49 year old male with a past medical history of acute ischemic CVA with multifocal L MCA infarct 07/2023 and subacute right PCA infarct 04/2024 both of unconfirmed etiology, tobacco abuse, and heavy alcohol use who was admitted to [facility] 12/09 with several day history of nausea and vomiting with dysequilibrium. Brain MRI 12/09 with large acute to subacute right paramedian cerebellar infarct in addition to old previously identified infarcts; MRA head and neck without significant large vessel occlusion or stenoses. Given strong suspicion for embolic etiology for CVAs, underwent MARIA TERESA 12/11 which demonstrated large (15 mm) linear mass attached to the ventricular surface of the aortic valve with differential including large Lambl's excrescence vs fibroelastoma or less likely thrombus or vegetation. No significant aortic regurgitation or valve abnormality noted. Preserved biventricular function, no obvious PFO, mild MR/TR. CV surgery consulted for consideration of surgical aortic valve debridement.     - Evaluate for surgical aortic valve debridement, possible AVR. Plan for minimally invasive approach via right thoracotomy.   - Ischemic eval with CT coronary angiogram 12/13. Mild non-obstructive CAD with 25-49% ostial LAD lesion noted. Reviewed with Dr. Delacruz who felt the LAD lesion was not significant and that the location of the AV mass would make formal invasive coronary angiogram high risk for recurrent embolic event. Discussed with Dr. Anne and will forgo invasive coronary angiogram.    - Clinical presentation not suggestive of infective endocarditis, but should be ruled out. WBC elevated on admission, now normalized. Afebrile. Blood culture 12/13 NGTD. CRP, ESR, procal 12/13 all normal. ID consulted and felt unlikely to be infectious IE.   - Neurology following. Reviewed timing of surgery and need for systemic anticoagulation for CBP. Given location and size of  CVA, neurology recommending waiting on surgery/systemic anticoagulation for 2-3 weeks.     - Recent root canal 11/30/2024 per patient. CT dental 12/13 without periapical lucency or active dental issue.   - Encourage tobacco cessation. Patient in agreement.   - Noted significant alcohol intake history. On CIWA protocol. Monitor for withdrawal. Encourage cessation.  - Out-patient follow-up with Dr. Anne arranged for Monday 12/16 at 4:00 PM. Patient aware of appointment. Will schedule surgery from there.     Marilyn Miller PA-C  Cardiothoracic Surgery  Available for paging [phone] (personal pager or CV Surgery Rounding Pager)  Personal Pager: [phone]  CV Surgery Rounding Pager: [phone]  After hours please page surgeon on-call

## 2024-12-14 NOTE — DISCHARGE SUMMARY
"[facility]  Hospitalist Discharge Summary      Date of Admission:  12/9/2024  Date of Discharge:  12/14/2024  Discharging Provider: Yudy Scott MD  Discharge Service: Hospitalist Service    Discharge Diagnoses   Right cerebellar CVA, cardioembolic  History of ischemic stroke  Aortic valve echodensity  Hyperlipidemia  Bilateral kidneys infarcts  Tobacco use disorder  Alcohol use    Clinically Significant Risk Factors     # Overweight: Estimated body mass index is 25.1 kg/m  as calculated from the following:    Height as of this encounter: 1.803 m (5' 11\").    Weight as of this encounter: 81.6 kg (180 lb).       Follow-ups Needed After Discharge   Follow-up Appointments       Follow-up and recommended labs and tests       Follow up with CV surgery/Dr. Anne on 12/16 at 4 PM as scheduled  Follow-up with stroke neurology in 6 to 8 weeks              Unresulted Labs Ordered in the Past 30 Days of this Admission       Date and Time Order Name Status Description    12/13/2024  8:18 AM Blood Culture Arm, Right Preliminary         These results will be followed up by PCP/CV surgery    Discharge Disposition   Discharged to home  Condition at discharge: Stable    Hospital Course   Mundo Kern is a 49 year old male with PMH multiple prior embolic strokes of undetermined source, HLD, alcohol use, who was admitted on 12/9/2024 with a cerebellar stroke.     Right cerebellar CVA, likely Cardioembolic   History of ischemic strokes  Hyperlipidemia  Aortic valve echodensity  History of L MCA stroke in 7/2023, known age-indeterminant right frontal and occipital strokes found on imaging in Apr 2024. Patient reports not taking his ASA prior to admission. Presents with loss of equilibrium with n/v x 2-3 days. No extremity weakness. Some coordination concerns. No vision changes  - MRI w/ large acute vs subacute R cerebellar CVA w/ mild edema, no hemorrhagic transformation. MRA w/ likely occlusion of R " superior cerebellar artery and R AICA is not visualized.  -MARIA TERESA with mobile large linear echodensity(15 mm)attached to the left ventricular aspect of aortic valve prolapsing into aortic root.  Differential includes large degenerative strand versus early fibroelastoma, vegetation and clot less likely but not entirely excluded.  Bubble study negative for flow across interatrial septum  -Stroke neurology followed while in-house  -Infectious disease also evaluated the patient appreciate input.  Given patient is afebrile, resolved leukocytosis off antibiotics, normal Pro-[place], CRP and sed rate less likely infectious etiology  -Cardiology evaluated the MARIA TERESA and recommended CT coronary angiogram which showed mild nonobstructive coronary artery disease.  Recommends serial follow-up MARIA TERESA afterwards to look for regrowth postop.  -CV surgery following, appreciate input  -Plan for surgical aortic valve debridement, possibly AVR, with minimally invasive approach via thoracotomy  -Timing of surgery given need for systemic anticoagulation for CBP, size of CVA and location, neurology recommending waiting on surgery/anticoagulation for 2 to 3 weeks  -CT dental for preop evaluation-unremarkable  -Daily aspirin 325 mg for secondary stroke prevention  -Statin 40 mg daily initiated during hospital stay  - Long term BP control < 130/80  -PT OT recommending home with assist/home with outpatient occupational therapy  -Outpatient follow-up with CV surgery on 12/16 for surgery as outpatient      Wedge shaped infarcts of bilateral kidneys:  CT C/A/P notable for wedged shaped hypoattenuation of both kidneys concerning for pyelo vs renal infarction.  - UA neg for infection. Denies irritative sxs. Elevated wbc cont at 15 resolved prior to discharge. Afebrile.  Renal function stable.  Outpatient follow-up     Tobacco use disorder  Smokes 1ppd. Declines NRT  - Encouraged cessation     Alcohol use  Drinks 6 pack of beer ~4x/ week. No significant  withdrawal noted here  - Encouraged cessation       Consultations This Hospital Stay   NEUROLOGY IP STROKE CONSULT  SPEECH LANGUAGE PATH ADULT IP CONSULT  PHARMACY IP CONSULT  PHARMACY IP CONSULT  PHARMACY IP CONSULT  PHYSICAL THERAPY ADULT IP CONSULT  OCCUPATIONAL THERAPY ADULT IP CONSULT  REHAB ADMISSIONS LIAISON IP CONSULT  CARE MANAGEMENT / SOCIAL WORK IP CONSULT  HEMATOLOGY & ONCOLOGY IP CONSULT  CARDIOLOGY IP CONSULT  CARDIOVASCULAR SURGERY IP CONSULT  INFECTIOUS DISEASES IP CONSULT  SMOKING CESSATION PROGRAM IP CONSULT  RESPIRATORY CARE IP CONSULT    Code Status   Full Code    Time Spent on this Encounter   I, Yudy Scott MD, personally saw the patient today and spent greater than 30 minutes discharging this patient.       Yudy Scott MD  [facility]  [address] [place] [address]  Phone: [phone]  ______________________________________________________________________    Physical Exam   Vital Signs: Temp: 98.4  F (36.9  C) Temp src: Oral BP: 109/69 Pulse: 86   Resp: 10 SpO2: 96 % O2 Device: None (Room air)    Weight: 180 lbs 0 oz  Exam:  Constitutional: Awake, alert and no distress. Appears comfortable  Head: Normocephalic. No masses, lesions, tenderness or abnormalities  ENMT: ENT exam normal, no neck nodes or sinus tenderness  Cardiovascular: RRR.  No murmurs, no rubs or JVD  Respiratory: Normal WOB,b/l equal air entry, no wheezes or crackles   Gastrointestinal: Abdomen soft, non-tender. BS normal. No masses, organomegaly  : Deferred  Extremities : No edema , no clubbing or cyanosis    Neurologic: Cranial nerves II-XII grossly intact , power symmetrical, Reflexes normal and symmetric. Sensation grossly WNL.        Primary Care Physician   [facility]    Discharge Orders      Physical Therapy  Referral      Occupational Therapy  Referral      Reason for your hospital stay    Cardioembolic stroke secondary to cardiac  mass     Follow-up and recommended labs and tests     Follow up with CV surgery/Dr. Anne on 12/16 at 4 PM as scheduled  Follow-up with stroke neurology in 6 to 8 weeks     Activity    Your activity upon discharge: activity as tolerated     Diet    Follow this diet upon discharge: Current Diet:Orders Placed This Encounter      Regular Diet Adult Thin Liquids (level 0)     Stroke Hospital Follow Up (for neurologist use only)    [facility] to [facility] care as prescribed by your provider. If you don t hear from a representative within 2 business days, please call [phone].         Significant Results and Procedures   Results for orders placed or performed during the hospital encounter of 12/09/24   MR Brain w/o & w Contrast    Narrative    EXAM: MR BRAIN W/O and W CONTRAST, MRA NECK (CAROTIDS) W/O and W CONTRAST, MRA BRAIN ([place]) W/O CONTRAST  LOCATION: [facility]  DATE/TIME: 12/9/2024 10:20 PM CST    INDICATION: dizziness, dysmetria RUE, h o stroke  COMPARISON: None.  CONTRAST: 10mL Gadavist   TECHNIQUE:   1) Routine multiplanar multisequence head MRI with and without intravenous contrast.  2) 3D time-of-flight head MRA without intravenous contrast.  3) Neck MRA without and with IV contrast. Stenosis measurements made according to NASCET criteria unless otherwise specified.    FINDINGS:  HEAD MRI:  INTRACRANIAL CONTENTS: Large acute versus subacute right paramedian cerebellar hemisphere infarct with mild associated mass effect. No hemorrhagic transformation. Right PCA territory encephalomalacia with cortical laminar necrosis from prior infarct.   Additional small region of encephalomalacia in the right frontal operculum, also likely secondary to remote infarct. No mass, acute hemorrhage, or extra-axial fluid collections. Normal brain parenchymal signal. Normal ventricles and sulci. Normal   Position of the cerebellar tonsils. No pathologic contrast  enhancement.    SELLA: No abnormality accounting for technique.    OSSEOUS STRUCTURES/SOFT TISSUES: Normal marrow signal.     ORBITS: No abnormality accounting for technique.     SINUSES/MASTOIDS: No paranasal sinus mucosal disease. No middle ear or mastoid effusion.     HEAD MRA:   ANTERIOR CIRCULATION: No stenosis/occlusion, aneurysm, or high flow vascular malformation. Standard [place] anatomy.    POSTERIOR CIRCULATION: No stenosis/occlusion of the major arteries, aneurysm, or high flow vascular malformation. The right superior cerebellar artery is likely occluded and the right AICA is not visualized. Balanced vertebral arteries supply a normal   basilar artery.     NECK MRA:   RIGHT CAROTID: No measurable stenosis or dissection.    LEFT CAROTID: No measurable stenosis or dissection.    VERTEBRAL ARTERIES: No focal stenosis or dissection. Balanced vertebral arteries.    AORTIC ARCH: Classic aortic arch anatomy with no significant stenosis at the origin of the great vessels.      Impression    IMPRESSION:  HEAD MRI:   1.  Large acute versus subacute right cerebellar hemisphere infarct with mild associated edema. No significant posterior fossa mass effect. No hemorrhagic transformation.   2.  Remote right PCA territory infarct and likely small right frontal operculum infarct.    HEAD MRA:   1.  No large vessel occlusion or hemodynamically significant stenosis.  2.  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     NECK MRA:  1.  Normal neck MRA.    Findings discussed with Dr. Agarwal 12/9/2024 10:40 PM CST   MRA Angiogram Head w/o Contrast    Narrative    EXAM: MR BRAIN W/O and W CONTRAST, MRA NECK (CAROTIDS) W/O and W CONTRAST, MRA BRAIN ([place]) W/O CONTRAST  LOCATION: [facility]  DATE/TIME: 12/9/2024 10:20 PM CST    INDICATION: dizziness, dysmetria RUE, h o stroke  COMPARISON: None.  CONTRAST: 10mL Gadavist    TECHNIQUE:   1) Routine multiplanar multisequence head MRI with and without intravenous contrast.  2) 3D time-of-flight head MRA without intravenous contrast.  3) Neck MRA without and with IV contrast. Stenosis measurements made according to NASCET criteria unless otherwise specified.    FINDINGS:  HEAD MRI:  INTRACRANIAL CONTENTS: Large acute versus subacute right paramedian cerebellar hemisphere infarct with mild associated mass effect. No hemorrhagic transformation. Right PCA territory encephalomalacia with cortical laminar necrosis from prior infarct.   Additional small region of encephalomalacia in the right frontal operculum, also likely secondary to remote infarct. No mass, acute hemorrhage, or extra-axial fluid collections. Normal brain parenchymal signal. Normal ventricles and sulci. Normal   Position of the cerebellar tonsils. No pathologic contrast enhancement.    SELLA: No abnormality accounting for technique.    OSSEOUS STRUCTURES/SOFT TISSUES: Normal marrow signal.     ORBITS: No abnormality accounting for technique.     SINUSES/MASTOIDS: No paranasal sinus mucosal disease. No middle ear or mastoid effusion.     HEAD MRA:   ANTERIOR CIRCULATION: No stenosis/occlusion, aneurysm, or high flow vascular malformation. Standard [place] anatomy.    POSTERIOR CIRCULATION: No stenosis/occlusion of the major arteries, aneurysm, or high flow vascular malformation. The right superior cerebellar artery is likely occluded and the right AICA is not visualized. Balanced vertebral arteries supply a normal   basilar artery.     NECK MRA:   RIGHT CAROTID: No measurable stenosis or dissection.    LEFT CAROTID: No measurable stenosis or dissection.    VERTEBRAL ARTERIES: No focal stenosis or dissection. Balanced vertebral arteries.    AORTIC ARCH: Classic aortic arch anatomy with no significant stenosis at the origin of the great vessels.      Impression    IMPRESSION:  HEAD MRI:   1.  Large acute versus  subacute right cerebellar hemisphere infarct with mild associated edema. No significant posterior fossa mass effect. No hemorrhagic transformation.   2.  Remote right PCA territory infarct and likely small right frontal operculum infarct.    HEAD MRA:   1.  No large vessel occlusion or hemodynamically significant stenosis.  2.  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     NECK MRA:  1.  Normal neck MRA.    Findings discussed with Dr. Agarwal 12/9/2024 10:40 PM CST   MRA Angiogram Neck w/o & w Contrast    Narrative    EXAM: MR BRAIN W/O and W CONTRAST, MRA NECK (CAROTIDS) W/O and W CONTRAST, MRA BRAIN ([place]) W/O CONTRAST  LOCATION: [facility]  DATE/TIME: 12/9/2024 10:20 PM CST    INDICATION: dizziness, dysmetria RUE, h o stroke  COMPARISON: None.  CONTRAST: 10mL Gadavist   TECHNIQUE:   1) Routine multiplanar multisequence head MRI with and without intravenous contrast.  2) 3D time-of-flight head MRA without intravenous contrast.  3) Neck MRA without and with IV contrast. Stenosis measurements made according to NASCET criteria unless otherwise specified.    FINDINGS:  HEAD MRI:  INTRACRANIAL CONTENTS: Large acute versus subacute right paramedian cerebellar hemisphere infarct with mild associated mass effect. No hemorrhagic transformation. Right PCA territory encephalomalacia with cortical laminar necrosis from prior infarct.   Additional small region of encephalomalacia in the right frontal operculum, also likely secondary to remote infarct. No mass, acute hemorrhage, or extra-axial fluid collections. Normal brain parenchymal signal. Normal ventricles and sulci. Normal   Position of the cerebellar tonsils. No pathologic contrast enhancement.    SELLA: No abnormality accounting for technique.    OSSEOUS STRUCTURES/SOFT TISSUES: Normal marrow signal.     ORBITS: No abnormality accounting for technique.     SINUSES/MASTOIDS: No  paranasal sinus mucosal disease. No middle ear or mastoid effusion.     HEAD MRA:   ANTERIOR CIRCULATION: No stenosis/occlusion, aneurysm, or high flow vascular malformation. Standard [place] anatomy.    POSTERIOR CIRCULATION: No stenosis/occlusion of the major arteries, aneurysm, or high flow vascular malformation. The right superior cerebellar artery is likely occluded and the right AICA is not visualized. Balanced vertebral arteries supply a normal   basilar artery.     NECK MRA:   RIGHT CAROTID: No measurable stenosis or dissection.    LEFT CAROTID: No measurable stenosis or dissection.    VERTEBRAL ARTERIES: No focal stenosis or dissection. Balanced vertebral arteries.    AORTIC ARCH: Classic aortic arch anatomy with no significant stenosis at the origin of the great vessels.      Impression    IMPRESSION:  HEAD MRI:   1.  Large acute versus subacute right cerebellar hemisphere infarct with mild associated edema. No significant posterior fossa mass effect. No hemorrhagic transformation.   2.  Remote right PCA territory infarct and likely small right frontal operculum infarct.    HEAD MRA:   1.  No large vessel occlusion or hemodynamically significant stenosis.  2.  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     NECK MRA:  1.  Normal neck MRA.    Findings discussed with Dr. Agarwal 12/9/2024 10:40 PM CST   CT Chest/Abdomen/Pelvis w Contrast    Narrative    CT CHEST/ABDOMEN/PELVIS W CONTRAST 12/10/2024 12:36 PM    CLINICAL HISTORY: recurrent stroke r/o underlying malignancy    TECHNIQUE: CT scan of the chest, abdomen, and pelvis was performed  without IV contrast. Multiplanar reformats were obtained. Dose  reduction techniques were used.   CONTRAST: None.    COMPARISON: CT abdomen and pelvis performed on 7/19/2005    FINDINGS:   LUNGS AND PLEURA: No pleural effusion or pneumothorax is present.  Subsegmental atelectasis is seen in the lung bases.  Calcified  granulomas present in the right middle lobe. Punctate calcified  granuloma is noted within the left lower lobe.    MEDIASTINUM/AXILLAE: No lymphadenopathy. No thoracic aortic aneurysms.    CORONARY ARTERY CALCIFICATION: Mild.    HEPATOBILIARY: No significant mass or bile duct dilatation. No  calcified gallstones.     PANCREAS: No significant mass, duct dilatation, or inflammatory  change.    SPLEEN: Normal size.    ADRENAL GLANDS: No significant nodules.    KIDNEYS/BLADDER: No hydronephrosis is present. Wedge-shaped area of  hypoattenuation is seen along the inferior pole of the right kidney  (series 2, image 74). Similar appearing focus is also seen along the  inferior pole of the left kidney (series 6, image 58).    BOWEL: Diverticulosis in the colon. No acute inflammatory change. No  obstruction.     LYMPH NODES: No lymphadenopathy.    VASCULATURE: No abdominal aortic aneurysm.    PELVIC ORGANS: No pelvic masses.    OTHER: None.    MUSCULOSKELETAL: Mild degenerative changes are seen in the spine.  Sclerotic changes are seen along the superior and inferior endplates  of T8.      Impression    IMPRESSION:  1.  Wedge-shaped areas of hypoattenuation are noted along the inferior  poles of both kidneys. Findings can be seen in pyelonephritis although  no significant surrounding perinephric stranding is evident. Recommend  correlation with urinalysis. Renal infarction is also in the  differential diagnosis.    HE GROVE MD         SYSTEM ID:  [id number]   CT Dental wo Contrast    Narrative    EXAM: CT DENTAL WO CONTRAST  LOCATION: [facility]  DATE: 12/13/2024    INDICATION: pre op aortic valve surgery.  COMPARISON: None.  TECHNIQUE: Routine CT Maxillofacial without IV contrast. Multiplanar reformats. Dose reduction techniques were used.     FINDINGS:  OSSEOUS STRUCTURES/SOFT TISSUES: No localized soft tissue swelling/inflammation. No evidence for dental trauma or periapical  abscess.    ORBITAL CONTENTS: No acute abnormality.    SINUSES: No paranasal sinus mucosal disease.    VISUALIZED INTRACRANIAL CONTENTS: No acute abnormality. Old right occipital lobe infarct.      Impression    IMPRESSION:   1.  Normal maxillofacial CT.     Radiologist Consult For Cardiology    Narrative    RADIOLOGIST CONSULT FOR CARDIOLOGY 2024 2:24 PM    HISTORY: Cardiac mass    COMPARISON: None.      Impression    IMPRESSION: Calcified granulomas. Visualized lungs are otherwise  clear. See cardiologist report for cardiovascular findings.    MARK CHOI MD         SYSTEM ID:  [id number]   Transesophageal Echocardiogram     Value    LVEF  60-65%    Narrative    [id number]  [id number]  [id number]^LUIS ANGEL^ALEXEY^ARIN HERNANDEZ     [facility]  Echocardiography Laboratory  [address] [place] [address]     Name: MARK MEYER  MRN: [id number]  : 1975  Study Date: 2024 01:34 PM  Age: 49 yrs  Gender: Male  Patient Location: [facility]  Reason For Study: CVA  Ordering Physician: ALEXEY PLASENCIA  Performed By: Frieda Medeiros     BSA: 2.0 m2  Height: 71 in  Weight: 180 lb  HR: 96  BP: 128/93 mmHg  ______________________________________________________________________________  Procedure  Complete MARIA TERESA Adult. MARIA TERESA Probe serial #[id number] was used during the procedure.  ______________________________________________________________________________  Interpretation Summary     Left ventricular systolic function is normal.  The visual ejection fraction is 60-65%.  No regional wall motion abnormalities noted.  A mobile large linear echodensity (15mm) is noted attached to the left  ventricular aspect of the aortic valve prolapsing into the aortic root.  Differential includes large degenerative strand versus early fibroblastoma.  vegetation and clot less likely but not entirely excluded. Clinical  correlation advised.  There is no color Doppler evidence of an atrial shunt.  A  contrast injection (Bubble Study) was performed that was negative for flow  across the interatrial septum.  Valsalva could not be performed. Abdominal pressure applied.  No thrombus is detected in the left atrial appendage.  ______________________________________________________________________________  MARIA TERESA  Sedation was administered and monitored by anesthesia. Sedation, endotracheal  intubation, and mechanical ventilation were initiated prior to the MARIA TERESA and  were monitored by anesthesia. The transesophageal probe was passed without  difficulty. Prior to the exam, the oral cavity was checked and no overcrowding  was noted.     Left Ventricle  The left ventricle is normal in size. There is normal left ventricular wall  thickness. Left ventricular systolic function is normal. The visual ejection  fraction is 60-65%. No regional wall motion abnormalities noted.     Right Ventricle  The right ventricle is normal size. The right ventricular systolic function is  normal.     Atria  Normal left atrial size. Right atrial size is normal. There is no color  Doppler evidence of an atrial shunt. A contrast injection (Bubble Study) was  performed that was negative for flow across the interatrial septum. Valsalva  could not be performed. Abdominal pressure applied. No thrombus is detected in  the left atrial appendage.     Mitral Valve  There is mild (1+) mitral regurgitation.     Tricuspid Valve  There is trace tricuspid regurgitation. Right ventricular systolic pressure  could not be approximated due to inadequate tricuspid regurgitation.     Aortic Valve  The aortic valve is trileaflet. No aortic regurgitation is present. No aortic  stenosis is present.     Pulmonic Valve  The pulmonic valve is not well seen, but is grossly normal. There is trace  pulmonic valvular regurgitation.     Vessels  The aortic root is normal size.     Pericardial/Pleural  There is no pericardial effusion.     Rhythm  Sinus rhythm was  noted.  ______________________________________________________________________________  MMode/2D Measurements & Calculations  Ao root diam: 3.8 cm  Ao root diam index Ht(cm/m): 2.1  Ao root diam index BSA (cm/m2): 1.9     ______________________________________________________________________________  Report approved by: Sarkis Lewis MD on 2024 03:33 PM         CT CORONARY ARTERY ANGIO W CALCIUM SCORE    Narrative    Procedure: CT CORONARY ARTERY ANGIO W CALCIUM SCORE   Examination Date: 2024 2:24 PM   Indication: pre-op minimally invasive aortic valve debridement   Ordering Provider: Marilyn Miller  Overall quality of the study: Good.     PROCEDURE: After obtaining informed consent,  ECG gated multi-slice  computed tomography of the heart  with and without intravenous  contrast  (Isovue 370, 110 mL, wasted 0 mL) was  performed on a  Siemens Dual Source Flash scanner without incident. Beta-blockers were  used to optimize heart rate (Metoprolol 50 mg Oral/ Metoprolol 0 mg  IV), PO Ivabradine 10 mg. Sublingual Nitrostat 0.4 mg was given prior  to scanning. Coronary artery calcium score was performed using the  Flash scanner protocol. CTA was performed in the spiral mode at a  heart rate of 59 bpm with 100 kVp. Images were reconstructed and  analyzed on a qunb workstation. Scan protocol was optimized to  minimize radiation exposure. The total radiation exposure including  calcium score was calculated to be 368 DLP, and 5.1 mSv.        Impression    IMPRESSION:  1.   Mild nonobstructive coronary artery disease.  2.  Total Agatston score 39.4 placing the patient in the 84 percentile  when compared to age and gender matched control group.  3.  Aortic root dilatation measuring 4 cm  4.  Please review Radiology report for incidental noncardiac findings  that will follow separately.    FINDINGS:    CORONARY CALCIUM SCORE    Total Agatston calcium score: 39.4   Left main: 0  Left anterior descendin.4  Left  circumflex: 0  Right coronary artery: 0   This places the patient in the 84 percentile when compared to age and  gender matched control group.    CORONARY ANGIOGRAPHY    DOMINANCE: Right dominant system.   Normal coronary origins and course.    LEFT MAIN:   The left main arises normally from the left coronary cusp and is  widely patent without any detectable stenosis.     LEFT ANTERIOR DESCENDING:   Mild ostial left anterior descending artery stenosis (25-49%) due to  calcified plaque. Mid left anterior descending artery appears patent.  There is a segment of the distal left anterior descending artery which  has motion artifact and stenosis evaluation is somewhat suboptimal but  the vessel appears likely patent. The first diagonal is a large size  vessel and appears patent, distal segment not well visualized. The  second diagonal is a large size vessel and appears patent.      RAMUS INTERMEDIUS  This is a large size vessel and appears patent without significant  atherosclerosis.     LEFT CIRCUMFLEX:   The proximal, mid left circumflex artery appear patent without  detectable stenosis.First obtuse marginal artery is a moderate-sized  size vessel and appears patent. The second obtuse marginal artery is a  moderate size vessel appears patent in proximal segment, distal  branches are not well visualized due to poor opacification. The third  obtuse marginal is a moderate size vessel and appears patent, distal  segments are not well visualized. Distal left circumflex artery is not  visualized.        RIGHT CORONARY ARTERY:   The proximal, mid and distal right coronary artery appear patent  without detectable stenosis. The right posterior descending artery is  a large size vessel and appears patent. The right posterolateral  artery is a large caliber vessel and appears patent    ADDITIONAL FINDINGS:     Aortic root dilatation measuring 4 cm   Normal pulmonary venous anatomy with all pulmonary veins draining into  the left  atrium.    There is no left ventricular mass or thrombus.   There is no pericardial effusion.  Please review Radiology report for incidental noncardiac findings that  will follow separately.    EMBER MOYA MD         SYSTEM ID:  [id number]       Discharge Medications   Current Discharge Medication List        START taking these medications    Details   aspirin (ASA) 325 MG EC tablet Take 1 tablet (325 mg) by mouth daily.  Qty: 30 tablet, Refills: 0    Comments: Future refills by PCP Dr. Palacios [facility] with phone number [phone].  Associated Diagnoses: Cerebrovascular accident (CVA), unspecified mechanism (H)      atorvastatin (LIPITOR) 40 MG tablet Take 1 tablet (40 mg) by mouth or Feeding Tube every evening.  Qty: 30 tablet, Refills: 0    Comments: Future refills by PCP Dr. Palacios [facility] with phone number [phone].  Associated Diagnoses: Cerebrovascular accident (CVA), unspecified mechanism (H)           Allergies   No Known Allergies

## 2024-12-14 NOTE — PLAN OF CARE
"/69 (BP Location: Left arm)   Pulse 86   Temp 98.4  F (36.9  C) (Oral)   Resp 10   Ht 1.803 m (5' 11\")   Wt 81.6 kg (180 lb)   SpO2 96%   BMI 25.10 kg/m      Patient name: Mundo Kern    Summary: Patient here with CVA and heart mass    Robby Atkinson, RN  [facility]    "

## 2024-12-15 NOTE — PLAN OF CARE
Occupational Therapy Discharge Summary    Reason for therapy discharge:    Discharged to home with outpatient therapy.    Progress towards therapy goal(s). See goals on Care Plan in [facility] electronic health record for goal details.  Goals met    Therapy recommendation(s):      Continued therapy is recommended.  Rationale/Recommendations:   .OT Rationale for DC Rec: Pt is typically ind and works a semi-active job at baseline working as  at [employer]. Ambulating with CGA-SBA w/ or without FWW. Pt making good progress and demonstrating improved insight into deficits, if home recommend OP OT/PT, rides from family, supervision w/ meds and IADLs, FWW and shower chair with assist/supervision for mobility and ADLs.

## 2024-12-16 ENCOUNTER — OFFICE VISIT (OUTPATIENT)
Dept: CARDIOLOGY | Facility: CLINIC | Age: 49
End: 2024-12-16
Payer: COMMERCIAL

## 2024-12-16 ENCOUNTER — PATIENT OUTREACH (OUTPATIENT)
Dept: CARE COORDINATION | Facility: CLINIC | Age: 49
End: 2024-12-16

## 2024-12-16 VITALS
DIASTOLIC BLOOD PRESSURE: 83 MMHG | HEIGHT: 71 IN | HEART RATE: 74 BPM | SYSTOLIC BLOOD PRESSURE: 118 MMHG | BODY MASS INDEX: 26.59 KG/M2 | WEIGHT: 189.9 LBS | OXYGEN SATURATION: 97 %

## 2024-12-16 DIAGNOSIS — I63.9 ACUTE ISCHEMIC STROKE (H): Primary | ICD-10-CM

## 2024-12-16 PROCEDURE — 99204 OFFICE O/P NEW MOD 45 MIN: CPT | Performed by: SURGERY

## 2024-12-16 NOTE — PROGRESS NOTES
[facility]: Transitions of Care Outreach  Chief Complaint   Patient presents with    Clinic Care Coordination - Post Hospital       Most Recent Admission Date: 12/9/2024   Most Recent Admission Diagnosis: Acute ischemic stroke (H) - I63.9     Most Recent Discharge Date: 12/14/2024   Most Recent Discharge Diagnosis: Acute ischemic stroke (H) - I63.9  Cerebrovascular accident (CVA), unspecified mechanism (H) - I63.9     Transitions of Care Assessment    Discharge Assessment  How are you doing now that you are home?: Writer spoke to patient shanae is doing well. He is seeing the cardiologist today. No questions or concerns.  How are your symptoms? (Red Flag symptoms escalate to triage hotline per guidelines): Improved  Do you know how to contact your clinic care team if you have future questions or changes to your health status? : Yes  Does the patient have their discharge instructions? : Yes  Does the patient have questions regarding their discharge instructions? : No  Were you started on any new medications or were there changes to any of your previous medications? : Yes  Does the patient have all of their medications?: Yes  Do you have questions regarding any of your medications? : No  Do you have all of your needed medical supplies or equipment (DME)?  (i.e. oxygen tank, CPAP, cane, etc.): Yes                  Follow up Plan     Follow-up and recommended labs and tests         Follow up with CV surgery/Dr. Anne on 12/16 at 4 PM as scheduled  Follow-up with stroke neurology in 6 to 8 weeks      Discharge Follow-Up  Discharge follow up appointment scheduled in alignment with recommended follow up timeframe or Transitions of Risk Category? (Low = within 30 days; Moderate= within 14 days; High= within 7 days): Yes  Discharge Follow Up Appointment Date: 12/16/24  Discharge Follow Up Appointment Scheduled with?: Specialty Care Provider    Future Appointments   Date Time Provider Department Center    12/16/2024  4:00 PM Marlena Anne MD [facility]       Outpatient Plan as outlined on AVS reviewed with patient.    For any urgent concerns, please contact our 24 hour nurse triage line: [phone])       RILEY Doss (she/her/hers)  Social Work Clinic Care Coordinator   [facility]  [email or web address]  [phone]

## 2024-12-17 NOTE — PROGRESS NOTES
[facility]  Cardiovascular and Thoracic Surgery Clinic Consultation    Mundo Kern MRN# [id number]   YOB: 1975 Age: 49 year old        Reason for consult: I was asked by Dr. Marx to evaluate this patient for possible aortic valve mass excision.           Assessment and Plan:   Mr. Kern is a very pleasant 48 yo M with recurrent ischemic/embolic CVAs in the setting of a highly mobile mass attached to the aortic valve. My recommendation is an aortic valve mass excision. This can most likely be done without replacing his aortic valve, and likely minimally invasively via a right mini-thoracotomy approach. His recent coronary CTA was negative for any significant coronary artery disease. Neurology has recommended to wait a few more weeks to proceed with surgery given the most recent stroke. He also needs dental work which will be done prior to surgery.    I went over the diagnosis in detail and the reason for recommending the proposed operation. I went over the risks and benefits of the operation, as well as the possible complications associated with the surgery. These complications include, but are not strictly limited to, infection, bleeding, stroke, postop MI, postop arrhythmias, pulmonary and renal complications. I think he is an overall excellent surgical candidate, and I quoted an operative mortality risk of 1%. I also explained to him that in the very unlikely situation where the valve needs to be replaced, we will use a mechanical valve, which he would have to be on lifelong anticoagulation with Coumadin. He understands and agrees to proceed.     It was a pleasure to see Mr. Kern today. We will plan for a right mini-thoracotomy aortic valve mass excision, possible AVR next month.       Attestation:  Marlena Anne MD           Chief Complaint:   Recurrent CVAs.         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 He was discharged from the hospital last week and presents today in clinic for follow up and to discuss surgical options.             Past Medical History:     Past Medical History:   Diagnosis Date    Cerebrovascular accident (H)     Tobacco use disorder              Past Surgical History:     Past Surgical History:   Procedure Laterality Date    TRANSESOPHAGEAL ECHOCARDIOGRAM INTRAOPERATIVE N/A 12/11/2024    Procedure: Transesophageal echocardiogram intraoperative;  Surgeon: GENERIC ANESTHESIA PROVIDER;  Location:  OR               Social History:     Social History     Tobacco Use    Smoking status: Every Day     Current packs/day: 1.00     Types: Cigarettes    Smokeless tobacco: Current   Substance Use Topics    Alcohol use: Yes     Comment: 6 pack of beer 3-4x per week             Family History:   History reviewed. No pertinent family history.          Immunizations:     Immunization History   Administered Date(s) Administered    COVID-19 MONOVALENT 12+ (Pfizer) 04/27/2021, 05/18/2021             Allergies:   No Known Allergies          Medications:     Current Outpatient Medications   Medication Sig Dispense Refill    aspirin (ASA) 325 MG EC tablet Take 1 tablet (325 mg) by mouth daily. 30 tablet 0    atorvastatin (LIPITOR) 40 MG tablet Take 1 tablet (40 mg) by mouth or Feeding Tube every evening. 30 tablet 0     No current facility-administered medications for this visit.             Review of Systems:   The 10 point Review of Systems is negative other than noted in the HPI           Physical Exam:   Vitals were reviewed                     General: appears well, in no acute distress  CV: RRR, normal S1 and S2, no M/G/R  Resp: CTAB  Abd: soft, ND  Ext: no edema  Neuro: no focal deficits              Data:     Lab Results    Component Value Date    WBC 8.6 2024    HGB 16.2 2024    HCT 45.8 2024     2024     2024    POTASSIUM 4.0 2024    CHLORIDE 107 2024    CO2 22 2024    BUN 8.0 2024    CR 0.92 2024     (H) 2024    SED 4 2024    AST 28 2024    ALT 24 2024    ALKPHOS 82 2024    BILITOTAL 0.7 2024    INR 1.03 2024       MARIA TERESA 24:  Results  Transesophageal Echocardiogram (Order [id number])  External Result Report    External Result Report     PACS Images     Show images for Transesophageal Echocardiogram  Transesophageal Echocardiogram  Order: [id number]  Status: Edited Result - FINAL       Visible to patient: No (inaccessible in MyChart)       Next appt: 2025 at 01:00 PM in Neurology (Kristan Redman MD)    0 Result Notes  Details    Reading Physician Reading Date Result Priority   Sarkis Lewis MD  [phone] 2024      Result Text  [id number]  [id number]  [id number]^LUIS [email or web address]^ALEXEY^ARIN HERNANDEZ     [facility]  Echocardiography Laboratory  [address] [place], [place] [address]     Name: MARK MEYER  MRN: [id number]  : 1975  Study Date: 2024 01:34 PM  Age: 49 yrs  Gender: Male  Patient [facility]: [facility]  Reason For Study: CVA  Ordering Physician: ALEXEY PLASENCIA  Performed By: Frieda Medeiros     BSA: 2.0 m2  Height: 71 in  Weight: 180 lb  HR: 96  BP: 128/93 mmHg  ______________________________________________________________________________  Procedure  Complete MARIA TERESA Adult. MARIA TERESA Probe serial #[id number] was used during the procedure.  ______________________________________________________________________________  Interpretation Summary     Left ventricular systolic function is normal.  The visual ejection fraction is 60-65%.  No regional wall motion abnormalities noted.  A mobile large linear echodensity (15mm) is noted attached to the  left  ventricular aspect of the aortic valve prolapsing into the aortic root.  Differential includes large degenerative strand versus early fibroblastoma.  vegetation and clot less likely but not entirely excluded. Clinical  correlation advised.  There is no color Doppler evidence of an atrial shunt.  A contrast injection (Bubble Study) was performed that was negative for flow  across the interatrial septum.  Valsalva could not be performed. Abdominal pressure applied.  No thrombus is detected in the left atrial appendage.  ______________________________________________________________________________  MARIA TERESA  Sedation was administered and monitored by anesthesia. Sedation, endotracheal  intubation, and mechanical ventilation were initiated prior to the MARIA TERESA and  were monitored by anesthesia. The transesophageal probe was passed without  difficulty. Prior to the exam, the oral cavity was checked and no overcrowding  was noted.     Left Ventricle  The left ventricle is normal in size. There is normal left ventricular wall  thickness. Left ventricular systolic function is normal. The visual ejection  fraction is 60-65%. No regional wall motion abnormalities noted.     Right Ventricle  The right ventricle is normal size. The right ventricular systolic function is  normal.     Atria  Normal left atrial size. Right atrial size is normal. There is no color  Doppler evidence of an atrial shunt. A contrast injection (Bubble Study) was  performed that was negative for flow across the interatrial septum. Valsalva  could not be performed. Abdominal pressure applied. No thrombus is detected in  the left atrial appendage.     Mitral Valve  There is mild (1+) mitral regurgitation.     Tricuspid Valve  There is trace tricuspid regurgitation. Right ventricular systolic pressure  could not be approximated due to inadequate tricuspid regurgitation.     Aortic Valve  The aortic valve is trileaflet. No aortic regurgitation is present. No  "aortic  stenosis is present.     Pulmonic Valve  The pulmonic valve is not well seen, but is grossly normal. There is trace  pulmonic valvular regurgitation.     Vessels  The aortic root is normal size.     Pericardial/Pleural  There is no pericardial effusion.     Rhythm  Sinus rhythm was noted.  ______________________________________________________________________________  MMode/2D Measurements & Calculations  Ao root diam: 3.8 cm  Ao root diam index Ht(cm/m): 2.1  Ao root diam index BSA (cm/m2): 1.9     ______________________________________________________________________________  Report approved by: Sarkis Lewis MD on 2024 03:33 PM          Coronary CTA 24:  CT Coronary Artery Angio w Calcium Score    Height: 1.803 m (5' 11\")   Weight: 81.6 kg (180 lb)   Blood Pressure: Not recorded    Date of Study: 24   Ordering Provider: Marilyn Miller PA-C   Clinical Indications: pre-op minimally invasive aortic valve debridement       Reading Physicians  Performing Staff    Sloan Lanier MD    Tech: Princess Panchal   Support Staff: Petty Jacob RN Otterlei, Kathryn, RN Forner, Katherine A        Patient Information    Patient Name  Mundo Kern MRN  [id number] Legal Sex  Male              Age  1975 (49 year old)     Reason for Exam  Priority: Routine  pre-op minimally invasive aortic valve debridement   Comments: Administration of IV contrast (contrast agent, dose, and amount) will be tailored to this examination per the appropriate written protocol listed in the Protocol E-Book, or by the interpreting provider. If you do not want your order altered by the radiologist, please state that in the order comments.     Interpretation Summary    Procedure: CT CORONARY ARTERY ANGIO W CALCIUM SCORE   Examination Date: 2024 2:24 PM   Indication: pre-op minimally invasive aortic valve debridement   Ordering Provider: Marilyn Miller  Overall quality of the study: Good.      PROCEDURE: " After obtaining informed consent,  ECG gated multi-slice  computed tomography of the heart  with and without intravenous  contrast  (Isovue 370, 110 mL, wasted 0 mL) was  performed on a  Siemens Dual Source Flash scanner without incident. Beta-blockers were  used to optimize heart rate (Metoprolol 50 mg Oral/ Metoprolol 0 mg  IV), PO Ivabradine 10 mg. Sublingual Nitrostat 0.4 mg was given prior  to scanning. Coronary artery calcium score was performed using the  Flash scanner protocol. CTA was performed in the spiral mode at a  heart rate of 59 bpm with 100 kVp. Images were reconstructed and  analyzed on a SCREEMO workstation. Scan protocol was optimized to  minimize radiation exposure. The total radiation exposure including  calcium score was calculated to be 368 DLP, and 5.1 mSv.                                                                        IMPRESSION:  1.   Mild nonobstructive coronary artery disease.  2.  Total Agatston score 39.4 placing the patient in the 84 percentile  when compared to age and gender matched control group.  3.  Aortic root dilatation measuring 4 cm  4.  Please review Radiology report for incidental noncardiac findings  that will follow separately.     FINDINGS:     CORONARY CALCIUM SCORE     Total Agatston calcium score: 39.4   Left main: 0  Left anterior descendin.4  Left circumflex: 0  Right coronary artery: 0   This places the patient in the 84 percentile when compared to age and  gender matched control group.     CORONARY ANGIOGRAPHY     DOMINANCE: Right dominant system.   Normal coronary origins and course.     LEFT MAIN:   The left main arises normally from the left coronary cusp and is  widely patent without any detectable stenosis.      LEFT ANTERIOR DESCENDING:   Mild ostial left anterior descending artery stenosis (25-49%) due to  calcified plaque. Mid left anterior descending artery appears patent.  There is a segment of the distal left anterior descending artery  which  has motion artifact and stenosis evaluation is somewhat suboptimal but  the vessel appears likely patent. The first diagonal is a large size  vessel and appears patent, distal segment not well visualized. The  second diagonal is a large size vessel and appears patent.        RAMUS INTERMEDIUS  This is a large size vessel and appears patent without significant  atherosclerosis.      LEFT CIRCUMFLEX:   The proximal, mid left circumflex artery appear patent without  detectable stenosis.First obtuse marginal artery is a moderate-sized  size vessel and appears patent. The second obtuse marginal artery is a  moderate size vessel appears patent in proximal segment, distal  branches are not well visualized due to poor opacification. The third  obtuse marginal is a moderate size vessel and appears patent, distal  segments are not well visualized. Distal left circumflex artery is not  visualized.           RIGHT CORONARY ARTERY:   The proximal, mid and distal right coronary artery appear patent  without detectable stenosis. The right posterior descending artery is  a large size vessel and appears patent. The right posterolateral  artery is a large caliber vessel and appears patent     ADDITIONAL FINDINGS:      Aortic root dilatation measuring 4 cm   Normal pulmonary venous anatomy with all pulmonary veins draining into  the left atrium.    There is no left ventricular mass or thrombus.   There is no pericardial effusion.  Please review Radiology report for incidental noncardiac findings that  will follow separately.     EMBER MOYA MD

## 2024-12-18 ENCOUNTER — DOCUMENTATION ONLY (OUTPATIENT)
Dept: CARDIOLOGY | Facility: CLINIC | Age: 49
End: 2024-12-18
Payer: COMMERCIAL

## 2024-12-18 LAB — BACTERIA BLD CULT: NO GROWTH

## 2024-12-18 NOTE — PROGRESS NOTES
Met with patient and family in consultation with Dr. Anne on 12/16   Pre-op labs and imaging reviewed and discussed with patient/family.  Pre-op education done.  Patient aware that he has to wait 2-3 weeks for neuro clearance prior to surgery.  Planned minimally invasive aortic valve debridement in near future.  Contact information reviewed and questions addressed.

## 2024-12-26 ENCOUNTER — PREP FOR PROCEDURE (OUTPATIENT)
Dept: CARDIOLOGY | Facility: CLINIC | Age: 49
End: 2024-12-26
Payer: COMMERCIAL

## 2024-12-26 ENCOUNTER — TELEPHONE (OUTPATIENT)
Dept: CARDIOLOGY | Facility: CLINIC | Age: 49
End: 2024-12-26
Payer: COMMERCIAL

## 2024-12-26 DIAGNOSIS — I35.8 AORTIC VALVE MASS: Primary | ICD-10-CM

## 2024-12-26 NOTE — TELEPHONE ENCOUNTER
Called and LVM for pt regarding pcp pre-op H&P appt scheduled for 01/09/25 @ 1pm, also need to schedule lab appt with pt. Will attempt to call pt again.

## 2025-01-05 LAB
ABO + RH BLD: NORMAL
BLD GP AB SCN SERPL QL: NEGATIVE
SPECIMEN EXP DATE BLD: NORMAL

## 2025-01-06 ENCOUNTER — LAB (OUTPATIENT)
Dept: LAB | Facility: CLINIC | Age: 50
End: 2025-01-06

## 2025-01-06 DIAGNOSIS — I35.8 AORTIC VALVE MASS: ICD-10-CM

## 2025-01-06 LAB
ALBUMIN SERPL BCG-MCNC: 4.2 G/DL (ref 3.5–5.2)
ALBUMIN UR-MCNC: NEGATIVE MG/DL
ALP SERPL-CCNC: 90 U/L (ref 40–150)
ALT SERPL W P-5'-P-CCNC: 43 U/L (ref 0–70)
ANION GAP SERPL CALCULATED.3IONS-SCNC: 10 MMOL/L (ref 7–15)
APPEARANCE UR: CLEAR
AST SERPL W P-5'-P-CCNC: 24 U/L (ref 0–45)
BILIRUB SERPL-MCNC: 0.3 MG/DL
BILIRUB UR QL STRIP: NEGATIVE
BLOOD BANK CHART COMMENT: NORMAL
BUN SERPL-MCNC: 9.2 MG/DL (ref 6–20)
CALCIUM SERPL-MCNC: 9.4 MG/DL (ref 8.8–10.4)
CHLORIDE SERPL-SCNC: 106 MMOL/L (ref 98–107)
COLOR UR AUTO: NORMAL
CREAT SERPL-MCNC: 1.05 MG/DL (ref 0.67–1.17)
EGFRCR SERPLBLD CKD-EPI 2021: 88 ML/MIN/1.73M2
GLUCOSE SERPL-MCNC: 105 MG/DL (ref 70–99)
GLUCOSE UR STRIP-MCNC: NEGATIVE MG/DL
HCO3 SERPL-SCNC: 25 MMOL/L (ref 22–29)
HGB UR QL STRIP: NEGATIVE
HOLD SPECIMEN: NORMAL
KETONES UR STRIP-MCNC: NEGATIVE MG/DL
LEUKOCYTE ESTERASE UR QL STRIP: NEGATIVE
NITRATE UR QL: NEGATIVE
PH UR STRIP: 6 [PH] (ref 5–7)
POTASSIUM SERPL-SCNC: 4.1 MMOL/L (ref 3.4–5.3)
PROT SERPL-MCNC: 6.5 G/DL (ref 6.4–8.3)
SODIUM SERPL-SCNC: 141 MMOL/L (ref 135–145)
SP GR UR STRIP: 1.01 (ref 1–1.03)
SPECIMEN EXP DATE BLD: NORMAL
UROBILINOGEN UR STRIP-MCNC: NORMAL MG/DL

## 2025-01-06 PROCEDURE — 36415 COLL VENOUS BLD VENIPUNCTURE: CPT

## 2025-01-06 PROCEDURE — 82040 ASSAY OF SERUM ALBUMIN: CPT

## 2025-01-06 PROCEDURE — 86900 BLOOD TYPING SEROLOGIC ABO: CPT

## 2025-01-06 PROCEDURE — 86850 RBC ANTIBODY SCREEN: CPT

## 2025-01-06 PROCEDURE — 81003 URINALYSIS AUTO W/O SCOPE: CPT

## 2025-01-06 PROCEDURE — 82310 ASSAY OF CALCIUM: CPT

## 2025-01-10 DIAGNOSIS — I63.9 CEREBROVASCULAR ACCIDENT (CVA), UNSPECIFIED MECHANISM (H): ICD-10-CM

## 2025-01-14 ENCOUNTER — TELEPHONE (OUTPATIENT)
Dept: NEUROLOGY | Facility: CLINIC | Age: 50
End: 2025-01-14
Payer: COMMERCIAL

## 2025-01-14 NOTE — TELEPHONE ENCOUNTER
Called and spoke with Mundo. Provided him with the number to contact Yina to discuss further. He will call her today.    Will send message to Dr. Redman and Yina so they are aware.     Graciela SALDIVAR RN, BSN  [facility]

## 2025-01-14 NOTE — TELEPHONE ENCOUNTER
----- Message from Fior COE sent at 1/14/2025  9:37 AM CST -----  Regarding: FW: Urgency Determination  Sent High Priority and RNCC is out.  ----- Message -----  From: Kristan Redman MD  Sent: 1/13/2025   8:38 PM CST  To: Yina Mendez; Miranda Cotto, RN  Subject: RE: Urgency Determination                        Otto Bran,     I consider this patient's surgery urgent. There is a tumor in the heart that has caused multiple strokes over the last 18 months and I am worried that the patient is at high risk for another stroke if the tumor is not surgically removed from the heart.    I see that the reason for delay is inability to reach the patient. I am copying our nursing staff to try to contact the patient from our end and encourage the patient to reach out to you.     Sincerely,     --  Kristan  ----- Message -----  From: Yina Mendez  Sent: 1/13/2025   2:50 PM CST  To: Kristan Redman MD  Subject: Urgency Determination                            Good Afternoon Dr. Redman     My name is Yina and I'm reaching out to you today because your patient has an upcoming surgery on 1/22/25. I am wondering if the patient can be safely postponed and if so, for how long? We are committed to working with the patient on possible coverage solutions but may require additional time beyond the current scheduled date. In order to prevent the patient from incurring unnecessary medical debt we need to determine if their care is considered urgent/emergent.    Here's what we know so far:    Is the patient eligible for a Medicaid plan or other funding?: Unknown at this time  Estimated Cost of Care: N/A   Barriers we're currently Encountering: Unable to reach patient by phone  Anticipated timeline to coverage: Unknown as we've been unable to screen the patient for MHCP    Thank you in advance for your input on this case. If care is deemed urgent/emergent, we will follow our standard escalation process per the Financially  Unsecured Policy.      Sincerest Thanks,    Yina Mendez   Financial Assistance Navigator  Pronouns: She/Her  [facility]   Phone: [phone]  [email or web address]

## 2025-01-14 NOTE — PROGRESS NOTES
PTA medications updated by Medication Scribe prior to surgery via phone call with patient (last doses completed by Nurse)     Medication history sources: Patient, Surescripts, and H&P  In the past week, patient estimated taking medication this percent of the time: Greater than 90%      Significant changes made to the medication list:  None      Additional medication history information:   None    Medication reconciliation completed by provider prior to medication history? No    Time spent in this activity: 15 minutes    The information provided in this note is only as accurate as the sources available at the time of update(s)      Prior to Admission medications    Medication Sig Last Dose Taking? Auth Provider Long Term End Date   aspirin (ASA) 325 MG EC tablet Take 1 tablet (325 mg) by mouth daily. Morning Yes Yudy Scott MD     atorvastatin (LIPITOR) 40 MG tablet Take 1 tablet (40 mg) by mouth or Feeding Tube every evening. Bedtime Yes Yudy Scott MD Yes        Medication history completed by: Jf Ventura

## 2025-01-15 ENCOUNTER — THERAPY VISIT (OUTPATIENT)
Dept: OCCUPATIONAL THERAPY | Facility: CLINIC | Age: 50
End: 2025-01-15
Attending: INTERNAL MEDICINE
Payer: COMMERCIAL

## 2025-01-15 DIAGNOSIS — R27.9 INCOORDINATION: ICD-10-CM

## 2025-01-15 DIAGNOSIS — I63.9 ACUTE ISCHEMIC STROKE (H): Primary | ICD-10-CM

## 2025-01-15 PROCEDURE — 97112 NEUROMUSCULAR REEDUCATION: CPT | Mod: GO | Performed by: OCCUPATIONAL THERAPIST

## 2025-01-15 PROCEDURE — 97165 OT EVAL LOW COMPLEX 30 MIN: CPT | Mod: GO | Performed by: OCCUPATIONAL THERAPIST

## 2025-01-15 NOTE — PROGRESS NOTES
OCCUPATIONAL THERAPY EVALUATION  Type of Visit: Evaluation       Fall Risk Screen:{TIP  If fall risk screening needs updating, please click link.:543446}  Fall screen completed by: OT  Have you fallen 2 or more times in the past year?: No  Have you fallen and had an injury in the past year?: No  Is patient a fall risk?: No    Subjective     Patient has had 3 strokes.  12/9 to 12/14  Mundo Kern is a 49 year old male with PMH multiple prior embolic strokes of undetermined source, HLD, alcohol use, who was admitted on 12/9/2024 with a cerebellar stroke.Presents with loss of equilibrium with n/v x 2-3 days. No extremity weakness. Some coordination concerns. No vision changes    Stroke #1-07/2023   Stroke #2: 4/2024  Stroke #3: 12/14/2024 1/20 aoritic valve debriedment     {Disappearing TIP  Health History pop-up / flowsheet :335921}  Presenting condition or subjective complaint: speeding up process of right hand coordination  Date of onset:    {Disapparing TIP  Date of Onset/Injury :197798}  Relevant medical history: Stroke   Dates & types of surgery:      Prior diagnostic imaging/testing results:       Prior therapy history for the same diagnosis, illness or injury: No      Prior Level of Function  Transfers: Independent  Ambulation: Independent  ADL: Independent  IADL: Driving, Finances, Housekeeping, Laundry, Meal preparation, Work    Living Environment  Social support: With family members (duplex, mother lives on bottom level)  Type of home: House   Stairs to enter the home: Yes 4 Is there a railing: Yes     Ramp: No   Stairs inside the home: Yes 4 Is there a railing: Yes     Help at home: Self Cares (home health aide/personal care attendant, family, etc)  Equipment owned:       Employment:    Wine distributor/, full time.  Job duties: driving, unloading trucks, climbing ladders,   carrying 40#.  Hobbies/Interests:  playing the guitar, softball, pickleball, walking 2 dogs    Patient goals for  "therapy: write normally and play guitar    Pain assessment: Pain denied     Objective     Cognitive Status Examination  Orientation: {[facility] COGNITIVE ORIENTATION STATUS (Optional):407215}   Level of Consciousness: {[facility] LEVEL OF CONSCIOUS:847039}  Follows Commands and Answers Questions: {[facility] COMMANDS QUESTIONS:456861}  Personal Safety and Judgement: {[facility] SAFETY AND JUDGEMENT:764727}  Memory: {[facility] intact impaired:704410}  Attention: {[facility] OT ATTENTION:064273}  Organization/Problem Solving: {[facility] OT ORG PROB SOLVIN}  Executive Function: {[facility] OT EXEC FUNCTION:861447}    VISUAL SKILLS  Visual Acuity:  Reading glasses  Visual Field: Further testing recommended  Visual Attention: Further testing recommended  Oculomotor: ***  Patient reports having a left upper field cut, and it is a little blurry from his 2nd stroke    SENSATION: UE Sensation WNL    POSTURE: WNL  RANGE OF MOTION: UE AROM WNL  STRENGTH: UE Strength WNL  MUSCLE TONE: {[facility] M TONE:504172}  COORDINATION: UE Coordination: ***  Left Hand, Nine Hole Peg Test (sec): 24.6\"  Right Hand, Nine Hole Peg Test (sec): 35.10\"  Right Hand, Box and Blocks Test (cubes transferred in 1 minute): 45  Left Hand, Box and Blocks Test (cubes transferred in 1 minute): 53  BALANCE: {[facility] BALANCE:839938}    Hand Dominance: Right     Right Left    Strength (lbs) 96# 96#   Lateral Pinch (lbs) 25# 23#   3 Point Pinch (lbs)         FUNCTIONAL MOBILITY  Assistive Device(s): None  Ambulation: Independeny   Wheelchair: ***    BED MOBILITY: {[facility] MOBILITY:466644}    TRANSFERS: {[facility] MOBILITY:409291}    BATHING: {[facility] MOBILITY:800013}  Equipment: {[facility] OT BATH EQUIP:759377}    UPPER BODY DRESSING: Independent  Equipment:     LOWER BODY DRESSING: Independent  Equipment: {[facility] OT LOW BODY EQUIP:279950}    TOILETING: Independent  Equipment: {[facility] OT TOILET EQUIP:330824}    GROOMING: Independent  Equipment: {[facility] OT GROOMING EQUIP:705386}    EATING/SELF FEEDING:  Is able to feed self but not as good " "as before.  Reports some shaking with using utensils.    Equipment:     ACTIVITY TOLERANCE: Denies any fatigue, sleeping well at night.      INSTRUMENTAL ACTIVITIES OF DAILY LIVING (IADL): ***  Meal Planning/Prep: Eats out a lot, girlfriend and mother assist with dicing/using knives.  \"I have never been confident with my dicing ability.\"  Home/Financial Management: Has help with all finances.    Medications:  Is using a pillbox, has forgotten to take medications.  Communication/Computer Use: Texting but doesn't do much typing on keyboard.  Community Mobility: \"I wasn't told I shouldn't but I can.\"  If patient has someone else with him, he will have them drive.    Assessment & Plan   CLINICAL IMPRESSIONS  Medical Diagnosis:    {Disappaering TIP  Medical Dx :121765}  Treatment Diagnosis:    {Disappearing TIP  Treatment Dx :402807}  Impression/Assessment: {[facility] ot assessment:042383}    Clinical Decision Making (Complexity):  Assessment of Occupational Performance: {Number of Occupations Affected:788113}  Occupational Performance Limitations: {Perf Deficits:967701}  Clinical Decision Making (Complexity): {Complexity:890832}    PLAN OF CARE  Treatment Interventions:  {:709430}    Long Term Goals {Disappearing TIP  Goals :802825}       {Disappearing TIP  Frequency/Duration :033287}  Frequency of Treatment:    Duration of Treatment:       Recommended Referrals to Other Professionals: {[facility]ita referrals suggested:182099}  Education Assessment:   {Disappearing TIP  Patient Education :843817}    Risks and benefits of evaluation/treatment have been explained.   Patient/Family/caregiver agrees with Plan of Care.     Evaluation Time:  {Disappearing TIP  Eval Minutes :774023}     {OPTIONAL EVAL ONLY:116159}     Signing Clinician: Ansley Ochoa OT        [facility]                                                                                   OUTPATIENT OCCUPATIONAL THERAPY  {Disappearing TIP  " Cert Quick Add :558547}    PLAN OF TREATMENT FOR OUTPATIENT REHABILITATION   Patient's Last Name, First Name, Mundo Higgins YOB: 1975   Provider's Name   [facility]   Medical Record No.  [id number]     Onset Date:   Start of Care Date:       Medical Diagnosis:         OT Treatment Diagnosis:    Plan of Treatment  Frequency/Duration: /     Certification date from     To          See note for plan of treatment details and functional goals     Ansley Ochoa OT                       {Rehab Co-Sign/Paper:705094}    Referring Provider:  Yudy Scott    Initial Assessment  See Epic Evaluation-

## 2025-01-20 ENCOUNTER — APPOINTMENT (OUTPATIENT)
Dept: GENERAL RADIOLOGY | Facility: CLINIC | Age: 50
DRG: 228 | End: 2025-01-20
Attending: PHYSICIAN ASSISTANT
Payer: COMMERCIAL

## 2025-01-20 ENCOUNTER — ANESTHESIA EVENT (OUTPATIENT)
Dept: SURGERY | Facility: CLINIC | Age: 50
End: 2025-01-20
Payer: COMMERCIAL

## 2025-01-20 ENCOUNTER — ANESTHESIA (OUTPATIENT)
Dept: SURGERY | Facility: CLINIC | Age: 50
End: 2025-01-20
Payer: COMMERCIAL

## 2025-01-20 ENCOUNTER — HOSPITAL ENCOUNTER (INPATIENT)
Facility: CLINIC | Age: 50
LOS: 3 days | Discharge: HOME | End: 2025-01-23
Attending: SURGERY | Admitting: SURGERY
Payer: COMMERCIAL

## 2025-01-20 ENCOUNTER — APPOINTMENT (OUTPATIENT)
Dept: CARDIOLOGY | Facility: CLINIC | Age: 50
DRG: 228 | End: 2025-01-20
Attending: SURGERY
Payer: COMMERCIAL

## 2025-01-20 DIAGNOSIS — Z98.890 S/P AORTIC VALVE REPAIR: Primary | ICD-10-CM

## 2025-01-20 DIAGNOSIS — Z98.890 S/P THORACOTOMY: ICD-10-CM

## 2025-01-20 LAB
ALBUMIN SERPL BCG-MCNC: 3.5 G/DL (ref 3.5–5.2)
ALLEN'S TEST: ABNORMAL
ALP SERPL-CCNC: 63 U/L (ref 40–150)
ALT SERPL W P-5'-P-CCNC: 29 U/L (ref 0–70)
ANION GAP SERPL CALCULATED.3IONS-SCNC: 8 MMOL/L (ref 7–15)
ANION GAP SERPL CALCULATED.3IONS-SCNC: 8 MMOL/L (ref 7–15)
APTT PPP: 31 SECONDS (ref 22–38)
APTT PPP: 32 SECONDS (ref 22–38)
AST SERPL W P-5'-P-CCNC: 23 U/L (ref 0–45)
ATRIAL RATE - MUSE: 70 BPM
BASE EXCESS BLDA CALC-SCNC: -0.6 MMOL/L (ref -3–3)
BASE EXCESS BLDA CALC-SCNC: -0.7 MMOL/L (ref -3–3)
BASE EXCESS BLDA CALC-SCNC: -1.1 MMOL/L (ref -3–3)
BASE EXCESS BLDA CALC-SCNC: 0.3 MMOL/L (ref -3–3)
BASE EXCESS BLDA CALC-SCNC: 0.8 MMOL/L (ref -3–3)
BASE EXCESS BLDA CALC-SCNC: 1.6 MMOL/L (ref -3–3)
BASE EXCESS BLDV CALC-SCNC: -2.5 MMOL/L (ref -3–3)
BASE EXCESS BLDV CALC-SCNC: 2 MMOL/L (ref -3–3)
BILIRUB SERPL-MCNC: 0.7 MG/DL
BLD PROD TYP BPU: NORMAL
BLD PROD TYP BPU: NORMAL
BLOOD COMPONENT TYPE: NORMAL
BLOOD COMPONENT TYPE: NORMAL
BUN SERPL-MCNC: 9.3 MG/DL (ref 6–20)
BUN SERPL-MCNC: 9.7 MG/DL (ref 6–20)
CA-I BLD-MCNC: 3.9 MG/DL (ref 4.4–5.2)
CA-I BLD-MCNC: 3.9 MG/DL (ref 4.4–5.2)
CA-I BLD-MCNC: 4.2 MG/DL (ref 4.4–5.2)
CA-I BLD-MCNC: 4.6 MG/DL (ref 4.4–5.2)
CA-I BLD-MCNC: 4.8 MG/DL (ref 4.4–5.2)
CA-I BLD-MCNC: 4.9 MG/DL (ref 4.4–5.2)
CA-I BLD-MCNC: 5.3 MG/DL (ref 4.4–5.2)
CALCIUM SERPL-MCNC: 8.3 MG/DL (ref 8.8–10.4)
CALCIUM SERPL-MCNC: 9.3 MG/DL (ref 8.8–10.4)
CHLORIDE SERPL-SCNC: 110 MMOL/L (ref 98–107)
CHLORIDE SERPL-SCNC: 112 MMOL/L (ref 98–107)
CLOT INIT KAOL IND TO POST HEP NEUT TRTO: 1 {RATIO}
CLOT INIT KAOL IND TO POST HEP NEUT TRTO: 1.1 {RATIO}
CLOT INIT KAOLIN IND BLD US: 105 SEC (ref 113–166)
CLOT INIT KAOLIN IND BLD US: 146 SEC (ref 113–166)
CLOT INIT KAOLIN IND P HEP NEUT BLD US: 151 SEC (ref 103–153)
CLOT INIT KAOLIN IND P HEP NEUT BLD US: 98 SEC (ref 103–153)
CLOT STIFF PLT CONT BLD CALC: 16.1 HPA (ref 11.9–29.8)
CLOT STIFF PLT CONT BLD CALC: 20 HPA (ref 11.9–29.8)
CLOT STIFF TF IND P HEP NEUT BLD US: 17.8 HPA (ref 13–33.2)
CLOT STIFF TF IND P HEP NEUT BLD US: 22.2 HPA (ref 13–33.2)
CLOT STIFF TF IND+IIB-IIIA INH P HEP NEU: 1.7 HPA (ref 1–3.7)
CLOT STIFF TF IND+IIB-IIIA INH P HEP NEU: 2.2 HPA (ref 1–3.7)
CODING SYSTEM: NORMAL
CODING SYSTEM: NORMAL
CREAT SERPL-MCNC: 0.88 MG/DL (ref 0.67–1.17)
CREAT SERPL-MCNC: 0.94 MG/DL (ref 0.67–1.17)
CROSSMATCH: NORMAL
CROSSMATCH: NORMAL
DIASTOLIC BLOOD PRESSURE - MUSE: NORMAL MMHG
EGFRCR SERPLBLD CKD-EPI 2021: >90 ML/MIN/1.73M2
EGFRCR SERPLBLD CKD-EPI 2021: >90 ML/MIN/1.73M2
ERYTHROCYTE [DISTWIDTH] IN BLOOD BY AUTOMATED COUNT: 12 % (ref 10–15)
ERYTHROCYTE [DISTWIDTH] IN BLOOD BY AUTOMATED COUNT: 12.1 % (ref 10–15)
EST. AVERAGE GLUCOSE BLD GHB EST-MCNC: 117 MG/DL
FIBRINOGEN PPP-MCNC: 221 MG/DL (ref 170–510)
GLUCOSE BLD-MCNC: 106 MG/DL (ref 70–99)
GLUCOSE BLD-MCNC: 107 MG/DL (ref 70–99)
GLUCOSE BLD-MCNC: 107 MG/DL (ref 70–99)
GLUCOSE BLD-MCNC: 127 MG/DL (ref 70–99)
GLUCOSE BLD-MCNC: 140 MG/DL (ref 70–99)
GLUCOSE BLD-MCNC: 158 MG/DL (ref 70–99)
GLUCOSE BLDC GLUCOMTR-MCNC: 106 MG/DL (ref 70–99)
GLUCOSE BLDC GLUCOMTR-MCNC: 120 MG/DL (ref 70–99)
GLUCOSE BLDC GLUCOMTR-MCNC: 122 MG/DL (ref 70–99)
GLUCOSE BLDC GLUCOMTR-MCNC: 128 MG/DL (ref 70–99)
GLUCOSE BLDC GLUCOMTR-MCNC: 129 MG/DL (ref 70–99)
GLUCOSE SERPL-MCNC: 124 MG/DL (ref 70–99)
GLUCOSE SERPL-MCNC: 142 MG/DL (ref 70–99)
HBA1C MFR BLD: 5.7 %
HCO3 BLD-SCNC: 24 MMOL/L (ref 21–28)
HCO3 BLDA-SCNC: 25 MMOL/L (ref 21–28)
HCO3 BLDA-SCNC: 26 MMOL/L (ref 21–28)
HCO3 BLDA-SCNC: 27 MMOL/L (ref 21–28)
HCO3 BLDA-SCNC: 27 MMOL/L (ref 21–28)
HCO3 BLDA-SCNC: 28 MMOL/L (ref 21–28)
HCO3 BLDV-SCNC: 23 MMOL/L (ref 21–28)
HCO3 BLDV-SCNC: 28 MMOL/L (ref 21–28)
HCO3 SERPL-SCNC: 23 MMOL/L (ref 22–29)
HCO3 SERPL-SCNC: 26 MMOL/L (ref 22–29)
HCT VFR BLD AUTO: 35.6 % (ref 40–53)
HCT VFR BLD AUTO: 37.1 % (ref 40–53)
HGB BLD-MCNC: 10.5 G/DL (ref 13.3–17.7)
HGB BLD-MCNC: 11 G/DL (ref 13.3–17.7)
HGB BLD-MCNC: 12.4 G/DL (ref 13.3–17.7)
HGB BLD-MCNC: 12.4 G/DL (ref 13.3–17.7)
HGB BLD-MCNC: 12.6 G/DL (ref 13.3–17.7)
HGB BLD-MCNC: 12.9 G/DL (ref 13.3–17.7)
HGB BLD-MCNC: 13.1 G/DL (ref 13.3–17.7)
HGB BLD-MCNC: 13.9 G/DL (ref 13.3–17.7)
INR PPP: 1.3 (ref 0.85–1.15)
INR PPP: 1.46 (ref 0.85–1.15)
INTERPRETATION ECG - MUSE: NORMAL
ISSUE DATE AND TIME: NORMAL
ISSUE DATE AND TIME: NORMAL
LACTATE BLD-SCNC: 0.6 MMOL/L (ref 0.7–2)
LACTATE BLD-SCNC: 0.8 MMOL/L (ref 0.7–2)
LACTATE BLD-SCNC: 0.9 MMOL/L (ref 0.7–2)
LACTATE BLD-SCNC: 0.9 MMOL/L (ref 0.7–2)
LACTATE SERPL-SCNC: 0.7 MMOL/L (ref 0.7–2)
MAGNESIUM SERPL-MCNC: 2.4 MG/DL (ref 1.7–2.3)
MCH RBC QN AUTO: 30.5 PG (ref 26.5–33)
MCH RBC QN AUTO: 30.8 PG (ref 26.5–33)
MCHC RBC AUTO-ENTMCNC: 34.8 G/DL (ref 31.5–36.5)
MCHC RBC AUTO-ENTMCNC: 35.3 G/DL (ref 31.5–36.5)
MCV RBC AUTO: 87 FL (ref 78–100)
MCV RBC AUTO: 88 FL (ref 78–100)
O2/TOTAL GAS SETTING VFR VENT: 100 %
O2/TOTAL GAS SETTING VFR VENT: 60 %
O2/TOTAL GAS SETTING VFR VENT: 60 %
O2/TOTAL GAS SETTING VFR VENT: 65 %
O2/TOTAL GAS SETTING VFR VENT: 75 %
O2/TOTAL GAS SETTING VFR VENT: 80 %
OXYHGB MFR BLDA: 96 % (ref 92–100)
OXYHGB MFR BLDA: 98 % (ref 92–100)
OXYHGB MFR BLDA: 99 % (ref 92–100)
OXYHGB MFR BLDV: 78 % (ref 70–75)
OXYHGB MFR BLDV: 80 % (ref 70–75)
P AXIS - MUSE: 47 DEGREES
PCO2 BLD: 41 MM HG (ref 35–45)
PCO2 BLDA: 42 MM HG (ref 35–45)
PCO2 BLDA: 46 MM HG (ref 35–45)
PCO2 BLDA: 47 MM HG (ref 35–45)
PCO2 BLDA: 53 MM HG (ref 35–45)
PCO2 BLDA: 63 MM HG (ref 35–45)
PCO2 BLDV: 39 MM HG (ref 40–50)
PCO2 BLDV: 50 MM HG (ref 40–50)
PEEP: 5 CM H2O
PH BLD: 7.38 [PH] (ref 7.35–7.45)
PH BLDA: 7.25 [PH] (ref 7.35–7.45)
PH BLDA: 7.32 [PH] (ref 7.35–7.45)
PH BLDA: 7.35 [PH] (ref 7.35–7.45)
PH BLDA: 7.38 [PH] (ref 7.35–7.45)
PH BLDA: 7.4 [PH] (ref 7.35–7.45)
PH BLDV: 7.36 [PH] (ref 7.32–7.43)
PH BLDV: 7.37 [PH] (ref 7.32–7.43)
PHOSPHATE SERPL-MCNC: 3.7 MG/DL (ref 2.5–4.5)
PLATELET # BLD AUTO: 155 10E3/UL (ref 150–450)
PLATELET # BLD AUTO: 196 10E3/UL (ref 150–450)
PO2 BLD: 182 MM HG (ref 80–105)
PO2 BLDA: 199 MM HG (ref 80–105)
PO2 BLDA: 315 MM HG (ref 80–105)
PO2 BLDA: 370 MM HG (ref 80–105)
PO2 BLDA: 434 MM HG (ref 80–105)
PO2 BLDA: 88 MM HG (ref 80–105)
PO2 BLDV: 42 MM HG (ref 25–47)
PO2 BLDV: 47 MM HG (ref 25–47)
POTASSIUM BLD-SCNC: 3.7 MMOL/L (ref 3.4–5.3)
POTASSIUM BLD-SCNC: 4 MMOL/L (ref 3.4–5.3)
POTASSIUM BLD-SCNC: 4 MMOL/L (ref 3.4–5.3)
POTASSIUM BLD-SCNC: 4.4 MMOL/L (ref 3.4–5.3)
POTASSIUM BLD-SCNC: 4.5 MMOL/L (ref 3.4–5.3)
POTASSIUM BLD-SCNC: 5 MMOL/L (ref 3.4–5.3)
POTASSIUM SERPL-SCNC: 4.6 MMOL/L (ref 3.4–5.3)
POTASSIUM SERPL-SCNC: 4.6 MMOL/L (ref 3.4–5.3)
PR INTERVAL - MUSE: 186 MS
PROT SERPL-MCNC: 5.1 G/DL (ref 6.4–8.3)
QRS DURATION - MUSE: 104 MS
QT - MUSE: 420 MS
QTC - MUSE: 453 MS
R AXIS - MUSE: -37 DEGREES
RBC # BLD AUTO: 4.06 10E6/UL (ref 4.4–5.9)
RBC # BLD AUTO: 4.26 10E6/UL (ref 4.4–5.9)
SAO2 % BLDA: 97 % (ref 96–97)
SAO2 % BLDA: 99 % (ref 96–97)
SAO2 % BLDA: 99.9 % (ref 96–97)
SAO2 % BLDA: >100 % (ref 96–97)
SAO2 % BLDV: 79 % (ref 70–75)
SAO2 % BLDV: 81.6 % (ref 70–75)
SODIUM BLD-SCNC: 141 MMOL/L (ref 135–145)
SODIUM BLD-SCNC: 142 MMOL/L (ref 135–145)
SODIUM BLD-SCNC: 143 MMOL/L (ref 135–145)
SODIUM SERPL-SCNC: 143 MMOL/L (ref 135–145)
SODIUM SERPL-SCNC: 144 MMOL/L (ref 135–145)
SYSTOLIC BLOOD PRESSURE - MUSE: NORMAL MMHG
T AXIS - MUSE: 65 DEGREES
UNIT ABO/RH: NORMAL
UNIT ABO/RH: NORMAL
UNIT NUMBER: NORMAL
UNIT NUMBER: NORMAL
UNIT STATUS: NORMAL
UNIT STATUS: NORMAL
UNIT TYPE ISBT: 5100
UNIT TYPE ISBT: 5100
VENTRICULAR RATE- MUSE: 70 BPM
WBC # BLD AUTO: 11.6 10E3/UL (ref 4–11)
WBC # BLD AUTO: 16.8 10E3/UL (ref 4–11)

## 2025-01-20 PROCEDURE — 02BG0ZZ EXCISION OF MITRAL VALVE, OPEN APPROACH: ICD-10-PCS | Performed by: SURGERY

## 2025-01-20 PROCEDURE — 999N000157 HC STATISTIC RCP TIME EA 10 MIN

## 2025-01-20 PROCEDURE — 258N000003 HC RX IP 258 OP 636

## 2025-01-20 PROCEDURE — 84132 ASSAY OF SERUM POTASSIUM: CPT | Performed by: SURGERY

## 2025-01-20 PROCEDURE — 250N000013 HC RX MED GY IP 250 OP 250 PS 637: Performed by: SURGERY

## 2025-01-20 PROCEDURE — 258N000003 HC RX IP 258 OP 636: Performed by: SURGERY

## 2025-01-20 PROCEDURE — 258N000003 HC RX IP 258 OP 636: Performed by: INTERNAL MEDICINE

## 2025-01-20 PROCEDURE — 83605 ASSAY OF LACTIC ACID: CPT | Performed by: PHYSICIAN ASSISTANT

## 2025-01-20 PROCEDURE — 33120 EXC ICAR TUM RESC W/CARD BYP: CPT | Performed by: SURGERY

## 2025-01-20 PROCEDURE — P9045 ALBUMIN (HUMAN), 5%, 250 ML: HCPCS

## 2025-01-20 PROCEDURE — 85027 COMPLETE CBC AUTOMATED: CPT | Performed by: SURGERY

## 2025-01-20 PROCEDURE — 83735 ASSAY OF MAGNESIUM: CPT | Performed by: PHYSICIAN ASSISTANT

## 2025-01-20 PROCEDURE — 84100 ASSAY OF PHOSPHORUS: CPT | Performed by: PHYSICIAN ASSISTANT

## 2025-01-20 PROCEDURE — 86923 COMPATIBILITY TEST ELECTRIC: CPT

## 2025-01-20 PROCEDURE — 82330 ASSAY OF CALCIUM: CPT | Performed by: PHYSICIAN ASSISTANT

## 2025-01-20 PROCEDURE — 85730 THROMBOPLASTIN TIME PARTIAL: CPT | Performed by: SURGERY

## 2025-01-20 PROCEDURE — 3E043XZ INTRODUCTION OF VASOPRESSOR INTO CENTRAL VEIN, PERCUTANEOUS APPROACH: ICD-10-PCS | Performed by: SURGERY

## 2025-01-20 PROCEDURE — 999N000259 HC STATISTIC EXTUBATION

## 2025-01-20 PROCEDURE — 93312 ECHO TRANSESOPHAGEAL: CPT

## 2025-01-20 PROCEDURE — 250N000013 HC RX MED GY IP 250 OP 250 PS 637

## 2025-01-20 PROCEDURE — 410N000006: Performed by: SURGERY

## 2025-01-20 PROCEDURE — 250N000009 HC RX 250

## 2025-01-20 PROCEDURE — 258N000003 HC RX IP 258 OP 636: Performed by: PHYSICIAN ASSISTANT

## 2025-01-20 PROCEDURE — 88313 SPECIAL STAINS GROUP 2: CPT | Mod: TC | Performed by: SURGERY

## 2025-01-20 PROCEDURE — 82805 BLOOD GASES W/O2 SATURATION: CPT | Performed by: PHYSICIAN ASSISTANT

## 2025-01-20 PROCEDURE — 82330 ASSAY OF CALCIUM: CPT

## 2025-01-20 PROCEDURE — 250N000011 HC RX IP 250 OP 636

## 2025-01-20 PROCEDURE — 250N000011 HC RX IP 250 OP 636: Performed by: SURGERY

## 2025-01-20 PROCEDURE — 82805 BLOOD GASES W/O2 SATURATION: CPT | Performed by: INTERNAL MEDICINE

## 2025-01-20 PROCEDURE — 272N000002 HC OR SUPPLY OTHER OPNP: Performed by: SURGERY

## 2025-01-20 PROCEDURE — 94002 VENT MGMT INPAT INIT DAY: CPT

## 2025-01-20 PROCEDURE — 83036 HEMOGLOBIN GLYCOSYLATED A1C: CPT | Performed by: PHYSICIAN ASSISTANT

## 2025-01-20 PROCEDURE — 85048 AUTOMATED LEUKOCYTE COUNT: CPT | Performed by: SURGERY

## 2025-01-20 PROCEDURE — 84295 ASSAY OF SERUM SODIUM: CPT | Performed by: SURGERY

## 2025-01-20 PROCEDURE — 88305 TISSUE EXAM BY PATHOLOGIST: CPT | Mod: 26

## 2025-01-20 PROCEDURE — 93005 ELECTROCARDIOGRAM TRACING: CPT

## 2025-01-20 PROCEDURE — 250N000013 HC RX MED GY IP 250 OP 250 PS 637: Performed by: INTERNAL MEDICINE

## 2025-01-20 PROCEDURE — 85610 PROTHROMBIN TIME: CPT | Performed by: PHYSICIAN ASSISTANT

## 2025-01-20 PROCEDURE — 250N000011 HC RX IP 250 OP 636: Mod: JZ | Performed by: PHYSICIAN ASSISTANT

## 2025-01-20 PROCEDURE — P9016 RBC LEUKOCYTES REDUCED: HCPCS

## 2025-01-20 PROCEDURE — 999N000065 XR CHEST PORT 1 VIEW

## 2025-01-20 PROCEDURE — 250N000009 HC RX 250: Performed by: SURGERY

## 2025-01-20 PROCEDURE — 250N000025 HC SEVOFLURANE, PER MIN: Performed by: SURGERY

## 2025-01-20 PROCEDURE — 88305 TISSUE EXAM BY PATHOLOGIST: CPT | Mod: TC | Performed by: SURGERY

## 2025-01-20 PROCEDURE — C1898 LEAD, PMKR, OTHER THAN TRANS: HCPCS | Performed by: SURGERY

## 2025-01-20 PROCEDURE — 80051 ELECTROLYTE PANEL: CPT

## 2025-01-20 PROCEDURE — 85396 CLOTTING ASSAY WHOLE BLOOD: CPT

## 2025-01-20 PROCEDURE — 99291 CRITICAL CARE FIRST HOUR: CPT | Performed by: INTERNAL MEDICINE

## 2025-01-20 PROCEDURE — 84132 ASSAY OF SERUM POTASSIUM: CPT

## 2025-01-20 PROCEDURE — 85730 THROMBOPLASTIN TIME PARTIAL: CPT | Performed by: PHYSICIAN ASSISTANT

## 2025-01-20 PROCEDURE — 93325 DOPPLER ECHO COLOR FLOW MAPG: CPT

## 2025-01-20 PROCEDURE — 5A1221Z PERFORMANCE OF CARDIAC OUTPUT, CONTINUOUS: ICD-10-PCS | Performed by: SURGERY

## 2025-01-20 PROCEDURE — 250N000009 HC RX 250: Performed by: ANESTHESIOLOGY

## 2025-01-20 PROCEDURE — 250N000013 HC RX MED GY IP 250 OP 250 PS 637: Performed by: PHYSICIAN ASSISTANT

## 2025-01-20 PROCEDURE — 370N000017 HC ANESTHESIA TECHNICAL FEE, PER MIN: Performed by: SURGERY

## 2025-01-20 PROCEDURE — 999N000141 HC STATISTIC PRE-PROCEDURE NURSING ASSESSMENT: Performed by: SURGERY

## 2025-01-20 PROCEDURE — 410N000005 HC PER-PERFUSION, SH ONLY, 1ST 30 MIN: Performed by: SURGERY

## 2025-01-20 PROCEDURE — 84155 ASSAY OF PROTEIN SERUM: CPT | Performed by: PHYSICIAN ASSISTANT

## 2025-01-20 PROCEDURE — 250N000009 HC RX 250: Performed by: PHYSICIAN ASSISTANT

## 2025-01-20 PROCEDURE — 272N000001 HC OR GENERAL SUPPLY STERILE: Performed by: SURGERY

## 2025-01-20 PROCEDURE — 85384 FIBRINOGEN ACTIVITY: CPT | Performed by: SURGERY

## 2025-01-20 PROCEDURE — 200N000001 HC R&B ICU

## 2025-01-20 PROCEDURE — 88313 SPECIAL STAINS GROUP 2: CPT | Mod: 26

## 2025-01-20 PROCEDURE — HZ2ZZZZ DETOXIFICATION SERVICES FOR SUBSTANCE ABUSE TREATMENT: ICD-10-PCS | Performed by: INTERNAL MEDICINE

## 2025-01-20 PROCEDURE — 36415 COLL VENOUS BLD VENIPUNCTURE: CPT | Performed by: SURGERY

## 2025-01-20 PROCEDURE — 360N000079 HC SURGERY LEVEL 6, PER MIN: Performed by: SURGERY

## 2025-01-20 PROCEDURE — 85610 PROTHROMBIN TIME: CPT | Performed by: SURGERY

## 2025-01-20 PROCEDURE — 84295 ASSAY OF SERUM SODIUM: CPT

## 2025-01-20 PROCEDURE — 250N000024 HC ISOFLURANE, PER MIN: Performed by: SURGERY

## 2025-01-20 PROCEDURE — 84132 ASSAY OF SERUM POTASSIUM: CPT | Performed by: PHYSICIAN ASSISTANT

## 2025-01-20 PROCEDURE — 85027 COMPLETE CBC AUTOMATED: CPT | Performed by: PHYSICIAN ASSISTANT

## 2025-01-20 RX ORDER — NALOXONE HYDROCHLORIDE 0.4 MG/ML
0.4 INJECTION, SOLUTION INTRAMUSCULAR; INTRAVENOUS; SUBCUTANEOUS
Status: DISCONTINUED | OUTPATIENT
Start: 2025-01-20 | End: 2025-01-23 | Stop reason: HOSPADM

## 2025-01-20 RX ORDER — SODIUM CHLORIDE, SODIUM LACTATE, POTASSIUM CHLORIDE, CALCIUM CHLORIDE 600; 310; 30; 20 MG/100ML; MG/100ML; MG/100ML; MG/100ML
INJECTION, SOLUTION INTRAVENOUS CONTINUOUS
Status: DISCONTINUED | OUTPATIENT
Start: 2025-01-20 | End: 2025-01-20 | Stop reason: HOSPADM

## 2025-01-20 RX ORDER — PROPOFOL 10 MG/ML
5-75 INJECTION, EMULSION INTRAVENOUS CONTINUOUS
Status: DISCONTINUED | OUTPATIENT
Start: 2025-01-20 | End: 2025-01-21

## 2025-01-20 RX ORDER — PROPOFOL 10 MG/ML
INJECTION, EMULSION INTRAVENOUS CONTINUOUS PRN
Status: DISCONTINUED | OUTPATIENT
Start: 2025-01-20 | End: 2025-01-20

## 2025-01-20 RX ORDER — ONDANSETRON 2 MG/ML
4 INJECTION INTRAMUSCULAR; INTRAVENOUS EVERY 6 HOURS PRN
Status: DISCONTINUED | OUTPATIENT
Start: 2025-01-20 | End: 2025-01-23 | Stop reason: HOSPADM

## 2025-01-20 RX ORDER — ASPIRIN 325 MG
325 TABLET ORAL DAILY
Status: DISCONTINUED | OUTPATIENT
Start: 2025-01-21 | End: 2025-01-23 | Stop reason: HOSPADM

## 2025-01-20 RX ORDER — FOLIC ACID 1 MG/1
1 TABLET ORAL DAILY
Status: COMPLETED | OUTPATIENT
Start: 2025-01-20 | End: 2025-01-21

## 2025-01-20 RX ORDER — HYDROMORPHONE HCL IN WATER/PF 6 MG/30 ML
0.4 PATIENT CONTROLLED ANALGESIA SYRINGE INTRAVENOUS
Status: DISCONTINUED | OUTPATIENT
Start: 2025-01-20 | End: 2025-01-23 | Stop reason: HOSPADM

## 2025-01-20 RX ORDER — DEXMEDETOMIDINE HYDROCHLORIDE 4 UG/ML
.2-.7 INJECTION, SOLUTION INTRAVENOUS CONTINUOUS
Status: DISCONTINUED | OUTPATIENT
Start: 2025-01-20 | End: 2025-01-21

## 2025-01-20 RX ORDER — SODIUM CHLORIDE, SODIUM LACTATE, POTASSIUM CHLORIDE, CALCIUM CHLORIDE 600; 310; 30; 20 MG/100ML; MG/100ML; MG/100ML; MG/100ML
INJECTION, SOLUTION INTRAVENOUS CONTINUOUS PRN
Status: DISCONTINUED | OUTPATIENT
Start: 2025-01-20 | End: 2025-01-20

## 2025-01-20 RX ORDER — CEFAZOLIN SODIUM 2 G/100ML
2 INJECTION, SOLUTION INTRAVENOUS EVERY 8 HOURS
Status: COMPLETED | OUTPATIENT
Start: 2025-01-20 | End: 2025-01-21

## 2025-01-20 RX ORDER — OXYCODONE HYDROCHLORIDE 5 MG/1
10 TABLET ORAL EVERY 4 HOURS PRN
Status: DISCONTINUED | OUTPATIENT
Start: 2025-01-20 | End: 2025-01-23 | Stop reason: HOSPADM

## 2025-01-20 RX ORDER — SODIUM CHLORIDE 9 MG/ML
INJECTION, SOLUTION INTRAVENOUS CONTINUOUS PRN
Status: DISCONTINUED | OUTPATIENT
Start: 2025-01-20 | End: 2025-01-20

## 2025-01-20 RX ORDER — CHLORHEXIDINE GLUCONATE ORAL RINSE 1.2 MG/ML
10 SOLUTION DENTAL ONCE
Status: COMPLETED | OUTPATIENT
Start: 2025-01-20 | End: 2025-01-20

## 2025-01-20 RX ORDER — HYDROMORPHONE HCL IN WATER/PF 6 MG/30 ML
0.2 PATIENT CONTROLLED ANALGESIA SYRINGE INTRAVENOUS
Status: DISCONTINUED | OUTPATIENT
Start: 2025-01-20 | End: 2025-01-23 | Stop reason: HOSPADM

## 2025-01-20 RX ORDER — PROCHLORPERAZINE MALEATE 10 MG
10 TABLET ORAL EVERY 6 HOURS PRN
Status: DISCONTINUED | OUTPATIENT
Start: 2025-01-20 | End: 2025-01-23 | Stop reason: HOSPADM

## 2025-01-20 RX ORDER — POLYETHYLENE GLYCOL 3350 17 G/17G
17 POWDER, FOR SOLUTION ORAL DAILY
Status: DISCONTINUED | OUTPATIENT
Start: 2025-01-21 | End: 2025-01-22

## 2025-01-20 RX ORDER — ACETAMINOPHEN 325 MG/1
975 TABLET ORAL ONCE
Status: COMPLETED | OUTPATIENT
Start: 2025-01-20 | End: 2025-01-20

## 2025-01-20 RX ORDER — DEXTROSE MONOHYDRATE 100 MG/ML
INJECTION, SOLUTION INTRAVENOUS CONTINUOUS PRN
Status: DISCONTINUED | OUTPATIENT
Start: 2025-01-20 | End: 2025-01-23 | Stop reason: HOSPADM

## 2025-01-20 RX ORDER — ACETAMINOPHEN 325 MG/1
975 TABLET ORAL EVERY 8 HOURS
Status: DISCONTINUED | OUTPATIENT
Start: 2025-01-20 | End: 2025-01-23 | Stop reason: HOSPADM

## 2025-01-20 RX ORDER — NALOXONE HYDROCHLORIDE 0.4 MG/ML
0.2 INJECTION, SOLUTION INTRAMUSCULAR; INTRAVENOUS; SUBCUTANEOUS
Status: DISCONTINUED | OUTPATIENT
Start: 2025-01-20 | End: 2025-01-23 | Stop reason: HOSPADM

## 2025-01-20 RX ORDER — HEPARIN SOD,PORCINE/0.9 % NACL 30K/1000ML
INTRAVENOUS SOLUTION INTRAVENOUS
Status: DISCONTINUED | OUTPATIENT
Start: 2025-01-20 | End: 2025-01-20 | Stop reason: HOSPADM

## 2025-01-20 RX ORDER — AMOXICILLIN 250 MG
1 CAPSULE ORAL 2 TIMES DAILY
Status: DISCONTINUED | OUTPATIENT
Start: 2025-01-20 | End: 2025-01-22

## 2025-01-20 RX ORDER — PANTOPRAZOLE SODIUM 40 MG/1
40 TABLET, DELAYED RELEASE ORAL DAILY
Status: DISCONTINUED | OUTPATIENT
Start: 2025-01-20 | End: 2025-01-23 | Stop reason: HOSPADM

## 2025-01-20 RX ORDER — METHOCARBAMOL 500 MG/1
500 TABLET, FILM COATED ORAL 4 TIMES DAILY
Status: DISCONTINUED | OUTPATIENT
Start: 2025-01-20 | End: 2025-01-21

## 2025-01-20 RX ORDER — SODIUM CHLORIDE 9 MG/ML
INJECTION, SOLUTION INTRAVENOUS CONTINUOUS
Status: DISCONTINUED | OUTPATIENT
Start: 2025-01-20 | End: 2025-01-21

## 2025-01-20 RX ORDER — ASPIRIN 81 MG/1
162 TABLET, CHEWABLE ORAL
Status: COMPLETED | OUTPATIENT
Start: 2025-01-20 | End: 2025-01-20

## 2025-01-20 RX ORDER — CEFAZOLIN SODIUM/WATER 2 G/20 ML
2 SYRINGE (ML) INTRAVENOUS SEE ADMIN INSTRUCTIONS
Status: DISCONTINUED | OUTPATIENT
Start: 2025-01-20 | End: 2025-01-20 | Stop reason: HOSPADM

## 2025-01-20 RX ORDER — LIDOCAINE HYDROCHLORIDE 20 MG/ML
INJECTION, SOLUTION INFILTRATION; PERINEURAL PRN
Status: DISCONTINUED | OUTPATIENT
Start: 2025-01-20 | End: 2025-01-20

## 2025-01-20 RX ORDER — ASPIRIN 81 MG/1
81 TABLET, CHEWABLE ORAL
Status: COMPLETED | OUTPATIENT
Start: 2025-01-20 | End: 2025-01-20

## 2025-01-20 RX ORDER — NICOTINE POLACRILEX 4 MG
15-30 LOZENGE BUCCAL
Status: DISCONTINUED | OUTPATIENT
Start: 2025-01-20 | End: 2025-01-20

## 2025-01-20 RX ORDER — PROPOFOL 10 MG/ML
INJECTION, EMULSION INTRAVENOUS PRN
Status: DISCONTINUED | OUTPATIENT
Start: 2025-01-20 | End: 2025-01-20

## 2025-01-20 RX ORDER — CEFAZOLIN SODIUM/WATER 2 G/20 ML
2 SYRINGE (ML) INTRAVENOUS
Status: COMPLETED | OUTPATIENT
Start: 2025-01-20 | End: 2025-01-20

## 2025-01-20 RX ORDER — ATORVASTATIN CALCIUM 40 MG/1
40 TABLET, FILM COATED ORAL EVERY EVENING
Status: DISCONTINUED | OUTPATIENT
Start: 2025-01-20 | End: 2025-01-23 | Stop reason: HOSPADM

## 2025-01-20 RX ORDER — BISACODYL 10 MG
10 SUPPOSITORY, RECTAL RECTAL DAILY PRN
Status: DISCONTINUED | OUTPATIENT
Start: 2025-01-23 | End: 2025-01-23 | Stop reason: HOSPADM

## 2025-01-20 RX ORDER — PROTAMINE SULFATE 10 MG/ML
INJECTION, SOLUTION INTRAVENOUS PRN
Status: DISCONTINUED | OUTPATIENT
Start: 2025-01-20 | End: 2025-01-20

## 2025-01-20 RX ORDER — MULTIPLE VITAMINS W/ MINERALS TAB 9MG-400MCG
1 TAB ORAL DAILY
Status: COMPLETED | OUTPATIENT
Start: 2025-01-20 | End: 2025-01-21

## 2025-01-20 RX ORDER — FENTANYL CITRATE 50 UG/ML
INJECTION, SOLUTION INTRAMUSCULAR; INTRAVENOUS PRN
Status: DISCONTINUED | OUTPATIENT
Start: 2025-01-20 | End: 2025-01-20

## 2025-01-20 RX ORDER — OXYCODONE HYDROCHLORIDE 5 MG/1
5 TABLET ORAL EVERY 4 HOURS PRN
Status: DISCONTINUED | OUTPATIENT
Start: 2025-01-20 | End: 2025-01-23 | Stop reason: HOSPADM

## 2025-01-20 RX ORDER — ONDANSETRON 4 MG/1
4 TABLET, ORALLY DISINTEGRATING ORAL EVERY 6 HOURS PRN
Status: DISCONTINUED | OUTPATIENT
Start: 2025-01-20 | End: 2025-01-23 | Stop reason: HOSPADM

## 2025-01-20 RX ORDER — DEXTROSE MONOHYDRATE 25 G/50ML
25-50 INJECTION, SOLUTION INTRAVENOUS
Status: DISCONTINUED | OUTPATIENT
Start: 2025-01-20 | End: 2025-01-20

## 2025-01-20 RX ORDER — CALCIUM GLUCONATE 20 MG/ML
2 INJECTION, SOLUTION INTRAVENOUS EVERY 6 HOURS PRN
Status: DISCONTINUED | OUTPATIENT
Start: 2025-01-20 | End: 2025-01-21

## 2025-01-20 RX ORDER — HYDROXYZINE HYDROCHLORIDE 25 MG/1
25 TABLET, FILM COATED ORAL EVERY 6 HOURS PRN
Status: DISCONTINUED | OUTPATIENT
Start: 2025-01-20 | End: 2025-01-23 | Stop reason: HOSPADM

## 2025-01-20 RX ORDER — HEPARIN SODIUM 5000 [USP'U]/.5ML
5000 INJECTION, SOLUTION INTRAVENOUS; SUBCUTANEOUS EVERY 8 HOURS
Status: DISCONTINUED | OUTPATIENT
Start: 2025-01-21 | End: 2025-01-23 | Stop reason: HOSPADM

## 2025-01-20 RX ORDER — DEXTROSE MONOHYDRATE 25 G/50ML
25-50 INJECTION, SOLUTION INTRAVENOUS
Status: DISCONTINUED | OUTPATIENT
Start: 2025-01-20 | End: 2025-01-23 | Stop reason: HOSPADM

## 2025-01-20 RX ORDER — VECURONIUM BROMIDE 1 MG/ML
INJECTION, POWDER, LYOPHILIZED, FOR SOLUTION INTRAVENOUS PRN
Status: DISCONTINUED | OUTPATIENT
Start: 2025-01-20 | End: 2025-01-20

## 2025-01-20 RX ORDER — BUPIVACAINE HYDROCHLORIDE 5 MG/ML
INJECTION, SOLUTION PERINEURAL PRN
Status: DISCONTINUED | OUTPATIENT
Start: 2025-01-20 | End: 2025-01-20 | Stop reason: HOSPADM

## 2025-01-20 RX ORDER — HEPARIN SODIUM 1000 [USP'U]/ML
INJECTION, SOLUTION INTRAVENOUS; SUBCUTANEOUS PRN
Status: DISCONTINUED | OUTPATIENT
Start: 2025-01-20 | End: 2025-01-20

## 2025-01-20 RX ORDER — FAMOTIDINE 20 MG/1
20 TABLET, FILM COATED ORAL
Status: COMPLETED | OUTPATIENT
Start: 2025-01-20 | End: 2025-01-20

## 2025-01-20 RX ORDER — CALCIUM GLUCONATE 20 MG/ML
1 INJECTION, SOLUTION INTRAVENOUS EVERY 6 HOURS PRN
Status: DISCONTINUED | OUTPATIENT
Start: 2025-01-20 | End: 2025-01-21

## 2025-01-20 RX ORDER — NICOTINE POLACRILEX 4 MG
15-30 LOZENGE BUCCAL
Status: DISCONTINUED | OUTPATIENT
Start: 2025-01-20 | End: 2025-01-23 | Stop reason: HOSPADM

## 2025-01-20 RX ORDER — LIDOCAINE 40 MG/G
CREAM TOPICAL
Status: DISCONTINUED | OUTPATIENT
Start: 2025-01-20 | End: 2025-01-20 | Stop reason: HOSPADM

## 2025-01-20 RX ORDER — LIDOCAINE 40 MG/G
CREAM TOPICAL
Status: DISCONTINUED | OUTPATIENT
Start: 2025-01-20 | End: 2025-01-23 | Stop reason: HOSPADM

## 2025-01-20 RX ORDER — NOREPINEPHRINE BITARTRATE 0.02 MG/ML
.01-.15 INJECTION, SOLUTION INTRAVENOUS CONTINUOUS PRN
Status: DISCONTINUED | OUTPATIENT
Start: 2025-01-20 | End: 2025-01-21

## 2025-01-20 RX ORDER — HYDRALAZINE HYDROCHLORIDE 20 MG/ML
10 INJECTION INTRAMUSCULAR; INTRAVENOUS EVERY 30 MIN PRN
Status: DISCONTINUED | OUTPATIENT
Start: 2025-01-20 | End: 2025-01-21

## 2025-01-20 RX ORDER — LIDOCAINE HYDROCHLORIDE 10 MG/ML
INJECTION, SOLUTION INFILTRATION; PERINEURAL
Status: COMPLETED | OUTPATIENT
Start: 2025-01-20 | End: 2025-01-20

## 2025-01-20 RX ADMIN — FENTANYL CITRATE 250 MCG: 50 INJECTION INTRAMUSCULAR; INTRAVENOUS at 09:30

## 2025-01-20 RX ADMIN — MIDAZOLAM HYDROCHLORIDE 2 MG: 1 INJECTION, SOLUTION INTRAMUSCULAR; INTRAVENOUS at 09:29

## 2025-01-20 RX ADMIN — MIDAZOLAM HYDROCHLORIDE 3 MG: 1 INJECTION, SOLUTION INTRAMUSCULAR; INTRAVENOUS at 09:30

## 2025-01-20 RX ADMIN — ACETAMINOPHEN 975 MG: 325 TABLET, FILM COATED ORAL at 07:36

## 2025-01-20 RX ADMIN — VECURONIUM BROMIDE 2 MG: 1 INJECTION, POWDER, LYOPHILIZED, FOR SOLUTION INTRAVENOUS at 11:01

## 2025-01-20 RX ADMIN — Medication 2 G: at 09:37

## 2025-01-20 RX ADMIN — PHENYLEPHRINE HYDROCHLORIDE 100 MCG: 10 INJECTION INTRAVENOUS at 09:30

## 2025-01-20 RX ADMIN — METOPROLOL TARTRATE 12.5 MG: 25 TABLET, FILM COATED ORAL at 07:37

## 2025-01-20 RX ADMIN — PROPOFOL 150 MG: 10 INJECTION, EMULSION INTRAVENOUS at 09:30

## 2025-01-20 RX ADMIN — OXYCODONE HYDROCHLORIDE 10 MG: 5 TABLET ORAL at 23:33

## 2025-01-20 RX ADMIN — FENTANYL CITRATE 250 MCG: 50 INJECTION INTRAMUSCULAR; INTRAVENOUS at 11:34

## 2025-01-20 RX ADMIN — SODIUM CHLORIDE: 9 INJECTION, SOLUTION INTRAVENOUS at 14:29

## 2025-01-20 RX ADMIN — PHENYLEPHRINE HYDROCHLORIDE 100 MCG: 10 INJECTION INTRAVENOUS at 09:37

## 2025-01-20 RX ADMIN — HYDROMORPHONE HYDROCHLORIDE 0.4 MG: 0.2 INJECTION, SOLUTION INTRAMUSCULAR; INTRAVENOUS; SUBCUTANEOUS at 16:56

## 2025-01-20 RX ADMIN — HYDROMORPHONE HYDROCHLORIDE 0.4 MG: 0.2 INJECTION, SOLUTION INTRAMUSCULAR; INTRAVENOUS; SUBCUTANEOUS at 14:55

## 2025-01-20 RX ADMIN — FENTANYL CITRATE 250 MCG: 50 INJECTION INTRAMUSCULAR; INTRAVENOUS at 10:21

## 2025-01-20 RX ADMIN — HEPARIN SODIUM 35000 UNITS: 1000 INJECTION INTRAVENOUS; SUBCUTANEOUS at 10:27

## 2025-01-20 RX ADMIN — PROTAMINE SULFATE 350 MG: 10 INJECTION, SOLUTION INTRAVENOUS at 12:11

## 2025-01-20 RX ADMIN — OXYCODONE HYDROCHLORIDE 5 MG: 5 TABLET ORAL at 14:16

## 2025-01-20 RX ADMIN — PROPOFOL 50 MCG/KG/MIN: 10 INJECTION, EMULSION INTRAVENOUS at 12:40

## 2025-01-20 RX ADMIN — ONDANSETRON 4 MG: 2 INJECTION, SOLUTION INTRAMUSCULAR; INTRAVENOUS at 23:18

## 2025-01-20 RX ADMIN — SODIUM CHLORIDE: 9 INJECTION, SOLUTION INTRAVENOUS at 09:59

## 2025-01-20 RX ADMIN — ROCURONIUM BROMIDE 100 MG: 50 INJECTION, SOLUTION INTRAVENOUS at 09:30

## 2025-01-20 RX ADMIN — SENNOSIDES AND DOCUSATE SODIUM 1 TABLET: 50; 8.6 TABLET ORAL at 20:48

## 2025-01-20 RX ADMIN — ACETAMINOPHEN 975 MG: 325 TABLET, FILM COATED ORAL at 21:01

## 2025-01-20 RX ADMIN — PHENYLEPHRINE HYDROCHLORIDE 100 MCG: 10 INJECTION INTRAVENOUS at 10:33

## 2025-01-20 RX ADMIN — ATORVASTATIN CALCIUM 40 MG: 40 TABLET, FILM COATED ORAL at 20:48

## 2025-01-20 RX ADMIN — NOREPINEPHRINE BITARTRATE 0.02 MCG/KG/MIN: 1 INJECTION, SOLUTION, CONCENTRATE INTRAVENOUS at 12:05

## 2025-01-20 RX ADMIN — SODIUM CHLORIDE, POTASSIUM CHLORIDE, SODIUM LACTATE AND CALCIUM CHLORIDE: 600; 310; 30; 20 INJECTION, SOLUTION INTRAVENOUS at 09:23

## 2025-01-20 RX ADMIN — VECURONIUM BROMIDE 3 MG: 1 INJECTION, POWDER, LYOPHILIZED, FOR SOLUTION INTRAVENOUS at 11:34

## 2025-01-20 RX ADMIN — Medication 200 MG: at 13:24

## 2025-01-20 RX ADMIN — FENTANYL CITRATE 250 MCG: 50 INJECTION INTRAMUSCULAR; INTRAVENOUS at 12:16

## 2025-01-20 RX ADMIN — LIDOCAINE HYDROCHLORIDE 100 MG: 20 INJECTION, SOLUTION INFILTRATION; PERINEURAL at 09:30

## 2025-01-20 RX ADMIN — HYDROMORPHONE HYDROCHLORIDE 0.4 MG: 0.2 INJECTION, SOLUTION INTRAMUSCULAR; INTRAVENOUS; SUBCUTANEOUS at 18:57

## 2025-01-20 RX ADMIN — FOLIC ACID 1 MG: 1 TABLET ORAL at 19:00

## 2025-01-20 RX ADMIN — FAMOTIDINE 20 MG: 20 TABLET, FILM COATED ORAL at 07:37

## 2025-01-20 RX ADMIN — METHOCARBAMOL 500 MG: 500 TABLET ORAL at 22:14

## 2025-01-20 RX ADMIN — OXYCODONE HYDROCHLORIDE 10 MG: 5 TABLET ORAL at 18:14

## 2025-01-20 RX ADMIN — CHLORHEXIDINE GLUCONATE 10 ML: 1.2 SOLUTION ORAL at 08:19

## 2025-01-20 RX ADMIN — LIDOCAINE HYDROCHLORIDE 2 ML: 10 INJECTION, SOLUTION INFILTRATION; PERINEURAL at 08:46

## 2025-01-20 RX ADMIN — THIAMINE HCL TAB 100 MG 100 MG: 100 TAB at 18:59

## 2025-01-20 RX ADMIN — AMINOCAPROIC ACID 5 G/HR: 250 INJECTION, SOLUTION INTRAVENOUS at 09:59

## 2025-01-20 RX ADMIN — VANCOMYCIN HYDROCHLORIDE 1500 MG: 10 INJECTION, POWDER, LYOPHILIZED, FOR SOLUTION INTRAVENOUS at 07:36

## 2025-01-20 RX ADMIN — CEFAZOLIN SODIUM 2 G: 2 INJECTION, SOLUTION INTRAVENOUS at 17:50

## 2025-01-20 RX ADMIN — HYDROMORPHONE HYDROCHLORIDE 0.2 MG: 0.2 INJECTION, SOLUTION INTRAMUSCULAR; INTRAVENOUS; SUBCUTANEOUS at 21:28

## 2025-01-20 RX ADMIN — HYDRALAZINE HYDROCHLORIDE 10 MG: 20 INJECTION INTRAMUSCULAR; INTRAVENOUS at 23:01

## 2025-01-20 RX ADMIN — ASPIRIN 162 MG: 81 TABLET, COATED ORAL at 07:36

## 2025-01-20 RX ADMIN — DEXMEDETOMIDINE HYDROCHLORIDE 0.2 MCG/KG/HR: 400 INJECTION INTRAVENOUS at 15:09

## 2025-01-20 RX ADMIN — SODIUM CHLORIDE, SODIUM LACTATE, POTASSIUM CHLORIDE, CALCIUM CHLORIDE: 600; 310; 30; 20 INJECTION, SOLUTION INTRAVENOUS at 09:59

## 2025-01-20 RX ADMIN — VANCOMYCIN HYDROCHLORIDE 1500 MG: 10 INJECTION, POWDER, LYOPHILIZED, FOR SOLUTION INTRAVENOUS at 20:34

## 2025-01-20 RX ADMIN — Medication 1 TABLET: at 18:59

## 2025-01-20 RX ADMIN — SODIUM CHLORIDE, POTASSIUM CHLORIDE, SODIUM LACTATE AND CALCIUM CHLORIDE 250 ML: 600; 310; 30; 20 INJECTION, SOLUTION INTRAVENOUS at 17:25

## 2025-01-20 ASSESSMENT — ACTIVITIES OF DAILY LIVING (ADL)
ADLS_ACUITY_SCORE: 31
ADLS_ACUITY_SCORE: 33
ADLS_ACUITY_SCORE: 31
ADLS_ACUITY_SCORE: 42
ADLS_ACUITY_SCORE: 31
ADLS_ACUITY_SCORE: 31
ADLS_ACUITY_SCORE: 32
ADLS_ACUITY_SCORE: 41
ADLS_ACUITY_SCORE: 42
ADLS_ACUITY_SCORE: 33
ADLS_ACUITY_SCORE: 32
ADLS_ACUITY_SCORE: 42
ADLS_ACUITY_SCORE: 31

## 2025-01-20 ASSESSMENT — LIFESTYLE VARIABLES: TOBACCO_USE: 1

## 2025-01-20 NOTE — ANESTHESIA PROCEDURE NOTES
Central Line/PA Catheter Placement    Pre-Procedure   Staff -        Anesthesiologist:  Natividad Chase MD       Performed By: anesthesiologist       Location: OR       Pre-Anesthestic Checklist: patient identified, IV checked, site marked, risks and benefits discussed, informed consent, monitors and equipment checked, pre-op evaluation and at physician/surgeon's request  Timeout:       Correct Patient: Yes        Correct Procedure: Yes        Correct Site: Yes        Correct Position: Yes        Correct Laterality: Yes   Line Placement:   This line was placed Post Induction    Procedure   Procedure: central line and elective       Laterality: right       Insertion Site: internal jugular.       Patient Position: Trendelenburg  Sterile Prep        All elements of maximal sterile barrier technique followed       Patient Prep/Sterile Barriers: draped, hand hygiene, gloves , hat , mask , draped, gown, sterile gel and probe cover       Skin prep: Chloraprep  Insertion/Injection        Technique: ultrasound guided        1. Ultrasound was used to evaluate the access site.       2. Vein evaluated via ultrasound for patency/adequacy.       3. Using real-time ultrasound the needle/catheter was observed entering the artery/vein.       4. Permanent image was captured and entered into the patient's record.       5. The visualized structures were anatomically normal.       6. There were no apparent abnormal pathologic findings.       Introducer Type: 9 Fr, 2-lumen MAC        Type: PA/CVC with Introducer       Catheter Size: 9 Fr       Number of Lumens: double lumen       PA Catheter Type: Thermodilution         Appropriate RV, RA and PA waveforms noted:  Yes            Withdrawn and Locked at cm: 51            Balloon down at end of the procedure:   Narrative         Secured by: suture       Tegaderm and Biopatch dressing used.       Complications: None apparent,        blood aspirated from all lumens,        All lumens  flushed: Yes       Verification method: Placement to be verified post-op       Tip termination: subclavian artery

## 2025-01-20 NOTE — ANESTHESIA PROCEDURE NOTES
Arterial Line Procedure Note    Pre-Procedure   Staff -        Anesthesiologist:  Natividad Chase MD       Performed By: anesthesiologist       Location: pre-op       Pre-Anesthestic Checklist: patient identified, IV checked, risks and benefits discussed, informed consent, monitors and equipment checked, pre-op evaluation and at physician/surgeon's request  Timeout:       Correct Patient: Yes        Correct Procedure: Yes        Correct Site: Yes        Correct Position: Yes   Line Placement:   This line was placed Pre Induction starting at 1/20/2025 8:46 AM and ending at 1/20/2025 8:51 AM  Procedure   Procedure: arterial line and new line       Laterality: left       Insertion Site: brachial.  Sterile Prep        Standard elements of sterile barrier followed       Skin prep: Chloraprep  Insertion/Injection        Technique: ultrasound guided        1. Ultrasound was used to evaluate the access site.       2. Artery evaluated via ultrasound for patency/adequacy.       3. Using real-time ultrasound the needle/catheter was observed entering the artery/vein.       4. Permanent image was captured and entered into the patient's record.       5. The visualized structures were anatomically normal.       6. There were no apparent abnormal pathologic findings.       Catheter Type/Size: 20 G, 12 cm  Narrative         Secured by: anchor securement device       Tegaderm dressing used.       Complications: None apparent,        Arterial waveform: Yes        IBP within 10% of NIBP: Yes   Comments:  U/S used d/t inability to feel pulse

## 2025-01-20 NOTE — PROGRESS NOTES
[facility] Intensive Care Unit  Critical Care Consult  January 20, 2025  Mundo Kern MRN# [id number]   Age: 49 year old YOB: 1975     Date of Admission: 1/20/2025    Primary care provider: Drew Gautam          Assessment and plan :   Mundo Kern is a 49 year old male admitted on 1/20/2025 for MV mass resection. My assessment and plan for this patient is as follows:    Neuro:  # Sedation for mechanical ventilation  # History of CVA  - Propofol for RASS of 0 to -1  - Resume ASA and statin once cleared by surgery     # Hx of alcohol use  - CIWA protocol and monitoring  - B1, folate, and MVI supplements    Pulm:  # Acute hypoxemic respiratory failure  - low tidal volume ventilation  - wean PEEP/FiO2, as able  - Goal SpO2 > 92%  - Ventilator bundle. PST, wean to extubate when meets criteria.        Cardiovascular:    # S/p MV mass resection  # Hx CVA  # Hx HLD  - NE gtt, wean as able  - Monitor hemodynamic status.   - Goal MAP >65, SBP <140  - Hold Statin      GI care / Nutrition:   - NPO, bedside swallow eval once extubated  - Nutrition consulted, appreciate recommendations  - PPI for bowel prophylaxis  - Bowel regimen: MiraLAX, senna     Renal / Fluids / Electrolytes:   # Volume status  # CKD  BL creat appears to be ~ 0.9  - Daily weights  - Avoid/limit nephrotoxins as able  - Replete lytes PRN per protocol  - Velazquez catheter for strict I/O monitoring     Endocrine:    # Stress hyperglycemia  Preop A1c 5.7%  - Insulin gtt  - Goal BG <180 for optimal healing     ID / Antibiotics:  # Stress induced leukocytosis  - No s/sx infection  - To complete perioperative regimen  - Continue to monitor fever curve, WBC, and inflammatory markers as appropriate     Heme:     # Acute blood loss anemia  # Acute blood loss thrombocytopenia  No s/sx active bleeding  - Hemoglobin 13.1 platelets 196 upon admission to ICU  - Continue to monitor  - CBC daily  - Hgb goal > 7.0     MSK /  Skin:  #Sternotomy  #Surgical Incision  - Sternal precautions  - Postoperative incision management per protocol  - PT/OT/CR     Prophylaxis:     - Mechanical ppx for DVT  - Chemical DVT ppx: start SQH as per CT surgery   - PPI  - Bowel regimen: PPI, MiraLAX, senna              Chief Complaint/ Reason for ICU Admission and HPI     Admitted for : No chief complaint on file.    History obtained from Records and sign out.  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 His coronary CTA was negative for any significant coronary artery disease. Underwent a procedure today that involved right mini-thoracotomy endoscopic-assisted removal or intracardiac mass, right common femoral arterial and venous cannulation for cardiopulmonary bypass, intraoperative MARIA TERESA. Mass was found to be attached to the mitral valve.      Intubation was easy  Echo was normal in the beginning and end of case   ml  Cell saver transfusion 400 ml  IVF 2L   ml    Came in with low dose norepi                          Past Medical History:     Past Medical History:   Diagnosis Date    Anxiety     Aortic valve mass     Atrial fibrillation (H)     Cerebrovascular accident (H)     Migraine             Past Surgical History:     Past Surgical History:   Procedure Laterality Date    REPAIR VALVE AORTIC MINIMALLY INVASIVE N/A 1/20/2025    Procedure: MINIMALLY INVASIVE EXCISION OF AORTIC VALVE MASS  ON PUMP/TRANSESOPHAGEAL ECHOCARDIOGRAM PER ANESTHESIA;  Surgeon: Marlena Anne MD;  Location:  OR    TRANSESOPHAGEAL ECHOCARDIOGRAM INTRAOPERATIVE N/A 12/11/2024    Procedure: Transesophageal echocardiogram intraoperative;  Surgeon: GENERIC ANESTHESIA PROVIDER;  Location:  OR            Social History:     Social History     Socioeconomic History    Marital status: Single     Spouse name: Not on file    Number of children: Not on file     Years of education: Not on file    Highest education level: Not on file   Occupational History    Not on file   Tobacco Use    Smoking status: Former     Types: Cigarettes    Smokeless tobacco: Never   Substance and Sexual Activity    Alcohol use: Not Currently     Comment: 6 pack of beer 3-4x per week    Drug use: Never    Sexual activity: Not on file   Other Topics Concern    Not on file   Social History Narrative    Not on file     Social Drivers of Health     Financial Resource Strain: Low Risk  (1/20/2025)    Financial Resource Strain     Within the past 12 months, have you or your family members you live with been unable to get utilities (heat, electricity) when it was really needed?: No   Food Insecurity: Low Risk  (1/20/2025)    Food Insecurity     Within the past 12 months, did you worry that your food would run out before you got money to buy more?: No     Within the past 12 months, did the food you bought just not last and you didn t have money to get more?: No   Transportation Needs: Low Risk  (1/20/2025)    Transportation Needs     Within the past 12 months, has lack of transportation kept you from medical appointments, getting your medicines, non-medical meetings or appointments, work, or from getting things that you need?: No   Physical Activity: Not on file   Stress: Not on file   Social Connections: Unknown (4/1/2024)    [facility]    Social Connections     Frequency of Communication with Friends and Family: Not on file   Interpersonal Safety: Low Risk  (1/20/2025)    Interpersonal Safety     Do you feel physically and emotionally safe where you currently live?: Yes     Within the past 12 months, have you been hit, slapped, kicked or otherwise physically hurt by someone?: No     Within the past 12 months, have you been humiliated or emotionally abused in other ways by your partner or ex-partner?: No   Housing Stability: Low Risk  (1/20/2025)     Housing Stability     Do you have housing? : Yes     Are you worried about losing your housing?: No     Smoking:   History   Smoking Status    Former    Types: Cigarettes   Smokeless Tobacco    Never     Alcohol:  Social History    Substance and Sexual Activity      Alcohol use: Not Currently        Comment: 6 pack of beer 3-4x per week    Drug:    History   Drug Use Unknown            Family History:   History reviewed. No pertinent family history.         Allergies:   Please see allergy list which was reviewed this admission.  No Known Allergies         Medications:     Medications Prior to Admission   Medication Sig Dispense Refill Last Dose/Taking    aspirin (ASA) 325 MG EC tablet Take 1 tablet (325 mg) by mouth daily. 30 tablet 0 Morning    atorvastatin (LIPITOR) 40 MG tablet Take 1 tablet (40 mg) by mouth or Feeding Tube every evening. 30 tablet 0 1/18/2025            Review of Systems:   Interval events:   Unable to obtain ROS due to intubation.          Physical Exam:   Vitals were reviewed  Temp:  [97.5  F (36.4  C)] 97.5  F (36.4  C)  Pulse:  [70-78] 78  Resp:  [7-16] 7  BP: (128)/(96) 128/96  FiO2 (%):  [60 %] 60 %  SpO2:  [100 %] 100 %    Intake/Output Summary (Last 24 hours) at 1/20/2025 1351  Last data filed at 1/20/2025 1311  Gross per 24 hour   Intake 2400 ml   Output 1650 ml   Net 750 ml       Exam:  Constitutional: Laying in bed in NAD  HEENT: NC AT, Pupils symmetrical, sclera anicteric,  Cardiac: S1 S2, regular rhythm on tele  Respiratory: intubated, B/L air entry, no wheezing or rhonchi  Gastrointestinal: Abdomen soft, non-tender. BS normal.  :  Velazquez  Musculoskeletal: extremities normal- no gross deformities noted.   Skin: no rashes, extremities edema: none.   Neurologic: sedated          Ancillary Data:   All laboratory and imaging data reviewed.     Recent Labs   Lab 01/20/25  1603 01/20/25  1449 01/20/25  1348 01/20/25  1347 01/20/25  1250 01/20/25  1230 01/20/25  1225   WBC  --   --  16.8*   --   --   --  11.6*   HGB  --   --  13.1*  --  12.4* 12.9* 12.4*   MCV  --   --  87  --   --   --  88   PLT  --   --  196  --   --   --  155   INR  --   --  1.30*  --   --   --  1.46*   NA  --   --  143  --  142 142 144   POTASSIUM  --   --  4.6  --  4.5 4.4 4.6   CHLORIDE  --   --  112*  --   --   --  110*   CO2  --   --  23  --   --   --  26   BUN  --   --  9.3  --   --   --  9.7   CR  --   --  0.94  --   --   --  0.88   ANIONGAP  --   --  8  --   --   --  8   SOFI  --   --  8.3*  --   --   --  9.3   * 106* 124*   < > 127* 140* 142*   ALBUMIN  --   --  3.5  --   --   --   --    PROTTOTAL  --   --  5.1*  --   --   --   --    BILITOTAL  --   --  0.7  --   --   --   --    ALKPHOS  --   --  63  --   --   --   --    ALT  --   --  29  --   --   --   --    AST  --   --  23  --   --   --   --     < > = values in this interval not displayed.        Westley Smith MD January 20, 2025    Time Spent on this Encounter   Mundo was seen and evaluated by me on 01/20/25.  He was in critical condition as the result of post-op care of intracardiac mass resection.    His condition is now Fair.      The acute issues managed by me today include post-op vent support, s/p intra-cardiac mass resection, and history of alcohol use.  Supportive interventions provided and/or ordered by me include mechanical ventilation, IV pressors, CIWA monitoring.    Total Critical Care time spent by me, excluding procedures, was 35 minutes.    Westley Smith MD

## 2025-01-20 NOTE — ANESTHESIA PROCEDURE NOTES
Perioperative MARIA TERESA Procedure Note    Staff -        Anesthesiologist:  Natividad Chase MD       Performed By: anesthesiologist  Preanesthesia Checklist:  Patient identified, IV assessed, risks and benefits discussed, monitors and equipment assessed, procedure being performed at surgeon's request and anesthesia consent obtained.    MARIA TERESA Probe Insertion    Probe Status PRE Insertion: NO obvious damage  Probe type:  Adult 3D  Bite block used:   Soft  Insertion Technique: Easy, no oropharyngeal manipulation  Insertion complications: None obvious  Billing Report:A MARIA TERESA report is NOT being generated.  Probe Status POST Removal: NO obvious damage

## 2025-01-20 NOTE — PROGRESS NOTES
Extubation Note    Successful completion of SBT (Yes or No):Yes   Extubation time:1532    Patient assessment:  Lung sounds: couse  Stridor Present (Yes or No): n  Patient tolerance: tolerated well    Oxygen device:  Liter flow:2l oxymask  FiO2:  SpO2:97%    Plan: follow care plan

## 2025-01-20 NOTE — ANESTHESIA POSTPROCEDURE EVALUATION
Patient: Mundo Kern    Procedure: Procedure(s):  MINIMALLY INVASIVE EXCISION OF AORTIC VALVE MASS  ON PUMP/TRANSESOPHAGEAL ECHOCARDIOGRAM PER ANESTHESIA       Anesthesia Type:  General    Note:  Disposition: ICU            ICU Sign Out: Anesthesiologist/ICU physician sign out WAS performed   Postop Pain Control: Uneventful            Sign Out: Well controlled pain   PONV:    Neuro/Psych: Uneventful            Sign Out: Acceptable/Baseline neuro status   Airway/Respiratory: Uneventful            Sign Out: AIRWAY IN SITU/Resp. Support   CV/Hemodynamics: Uneventful            Sign Out: Acceptable CV status; No obvious hypovolemia; No obvious fluid overload   Other NRE:    DID A NON-ROUTINE EVENT OCCUR?     Event details/Postop Comments:  Intubated and sedated. Reversed. Fast track appropriate. No issues with ventilation/oxygenation. NE at 0.02. V wires at backup rate. Signout given at bedside to ICU team.       Last vitals:  Vitals:    01/20/25 0600 01/20/25 1325   BP: (!) 128/96    Pulse: 70 78   Resp: 16 (!) 7   Temp: 36.4  C (97.5  F)    SpO2:  100%       Electronically Signed By: Natividad Chase MD  January 20, 2025  2:05 PM

## 2025-01-20 NOTE — OP NOTE
Date of Service: January 20th, 2025    Referring Cardiologist: Oscar Marx MD    Preoperative Diagnosis: intracardiac mass    Postoperative Diagnosis: intracardiac mass    Surgeon: Marlena Anne MD    Assistants: NURIA Smith, NURIA Sanchez    Name of Operation: right mini-thoracotomy endoscopic-assisted removal or intracardiac mass, right common femoral arterial and venous cannulation for cardiopulmonary bypass, intraoperative MARIA TERESA    Anesthesia: general orotracheal    Indications for Procedure: Mr. 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  He was taken to the operating today for a minimal invasive excision of the intracardiac mass.    Operative Findings: The patient had an overall normal LV systolic size and function.  He had a trileaflet aortic valve with no stenosis or insufficiency.  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  It did not have the typical appearance of a papillary fibroblastoma and it was too long for a Lambl's excrescence, but it definitely did not look infectious in nature or a healed vegetation.  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  Regardless, it was fully excised from the attachment and was sent to pathology as a permanent specimen in one piece.  No other abnormalities were seen.    Description of the Procedure: After informed consent was obtained, the patient was brought down to the operating room and was placed on the OR table in the supine position.  Intravenous and intra-arterial  lines were begun.  While monitoring his blood pressure and EKG tracing, he was anesthetized and intubated using a single-lumen endotracheal tube.  A [place] catheter was also placed.  His entire chest, abdomen, both groins and legs were prepped down to the knees using multiple layers of DuraPrep.  He was draped in a sterile field.  A right minithoracotomy incision was made, and the third intercostal space was entered.  An Jose David soft tissue wound retractor was used to aid in the exposure.  A 10 mm 30 degree endoscope was passed into the second intercostal space along the mid-axillary line using a trocar to visualize the rest of the operation.  Carbon dioxide was flooded into the chest to prevent air embolism.  A small right groin incision was then made, and the right common femoral artery and vein were dissected out.  The patient was fully heparinized.  5-0 Prolene pursestring sutures was used to cannulate the right common femoral artery and vein using a 17 Cypriot femoral arterial cannula and a 25 [place]riot multistage venous cannula, respectively.  Seldinger technique was used with MARIA TERESA guidance.  After appropriate ACT level was achieved, cardiopulmonary bypass was established and the patient was kept normothermic during the entire operation.    The right phrenic nerve was identified and protected.  The pericardium was opened 1 cm anteriorly in a parallel fashion to the phrenic nerve.  The venous drainage of the heart was excellent.  An LV vent was placed via the right superior pulmonary vein.  An antegrade needle/aortic root vent was placed in the ascending aorta.  The aorta was then crossclamped and 1200 cc of [place] antegrade cardioplegia was given to fully arrest the heart.  The patient went into good diastolic arrest without any LV distention.    A transverse aortotomy was then made and the aortic valve was exposed.  Findings were noted above.  We gently retracted the leaflets to look into the LVOT and  identified a single long filamentous structure that was originating from the base the anterior mitral valve leaflet along the ventricular side, which was completely excised at its attachment.  The specimen was sent as a permanent specimen to pathology for examination.  The area was copiously irrigated out with saline as well as the aortic root and the aortotomy was closed in 2 layers of pledgeted running 4-0 Prolene.  Patient was placed in Trendelenburg position.  Antegrade hotshot was given and the aortic cross-clamp was removed.  Aortic cross-clamp time was 49 minutes.  The ascending aorta was continuously vented to de-air the heart.  The patient was then weaned of cardiopulmonary bypass with minimal inotropic and vasopressor support.  Total cardiopulmonary bypass time was 92 minutes.  LV function was good.  The heart was adequately de-aired.  The aortic valve had no insufficiency.  The intracardiac mass was no longer visible on MARIA TERESA.  Once the patient remained stable off bypass, the femoral venous cannula was removed and protamine is given.  The femoral arterial cannula was subsequently removed as well.  The groin incision was irrigated out and was closed in layers running Vicryl suture.    The right lung reinflated nicely.  Temporary ventricular pacing wire was placed in the RV muscle.  Two 24 German Hetcor drains were placed in the right pleural space and these were both brought out percutaneously below the minithoracotomy incision and secured to the skin using 2-0 Ethibond.  Mediastinal hemostasis was obtained.  The rib space was reapproximated using 2 interrupted #5 Ethibond sutures.  The minithoracotomy incision was finally closed in layers of running Vicryl suture.  The skin was closed using 3-0 Vicryl and was sealed using Dermabond.    There were no intraoperative complications and patient tolerated the operation well.  No blood products were given intraoperatively.  All sponge counts, needle counts, and  instrument counts were correct x 2 at the end of the operation.  EBL: 200 cc.  Specimen removed: intracardiac mass.  The patient was brought up to the ICU in hemodynamically stable condition and remained intubated.      Marlena Anne MD

## 2025-01-20 NOTE — BRIEF OP NOTE
[facility]    Brief Operative Note    Pre-operative diagnosis: Aortic valve mass [I35.8]  Post-operative diagnosis Same as pre-operative diagnosis    Procedure: MINIMALLY INVASIVE EXCISION OF AORTIC VALVE MASS  ON PUMP/TRANSESOPHAGEAL ECHOCARDIOGRAM PER ANESTHESIA, N/A - Chest    Surgeon: Surgeons and Role:     * Marlena Anne MD - Primary     * Allison Gramajo PA-C - Assisting     * Gali George PA-C - Assisting  Anesthesia: General   Estimated Blood Loss: 900ml    Drains: 2 right pleural london drains  Specimens:   ID Type Source Tests Collected by Time Destination   1 : AORTIC VALVE MASS Tissue Heart Valve, Aortic SURGICAL PATHOLOGY EXAM Marlena Anne MD 1/20/2025 11:40 AM    A : POST CPB LABS. Blood Line, arterial CBC WITH PLATELETS, BASIC METABOLIC PANEL, FIBRINOGEN ACTIVITY, PARTIAL THROMBOPLASTIN TIME, INR Marlena Anne MD 1/20/2025 12:25 PM      Findings:   Mass seen and excised .  Complications: None.  Implants: * No implants in log *

## 2025-01-20 NOTE — ANESTHESIA CARE TRANSFER NOTE
Patient: Mundo Kern    Procedure: Procedure(s):  MINIMALLY INVASIVE EXCISION OF AORTIC VALVE MASS  ON PUMP/TRANSESOPHAGEAL ECHOCARDIOGRAM PER ANESTHESIA       Diagnosis: Aortic valve mass [I35.8]  Diagnosis Additional Information: No value filed.    Anesthesia Type:   General     Note:    Oropharynx: endotracheal tube in place and ventilatory support  Level of Consciousness: iatrogenic sedation      Independent Airway: airway patency not satisfactory and stable  Dentition: dentition unchanged  Vital Signs Stable: post-procedure vital signs reviewed and stable  Report to RN Given: handoff report given  Patient transferred to: ICU    ICU Handoff: Call for PAUSE to initiate/utilize ICU HANDOFF, Identified Patient, Identified Responsible Provider, Reviewed the Pertinent Medical History, Discussed Surgical Course, Reviewed Intra-OP Anesthesia Management and Issues during Anesthesia, Set Expectations for Post Procedure Period and Allowed Opportunity for Questions and Acknowledgement of Understanding  Vitals:  Vitals Value Taken Time   BP     Temp     Pulse 70 01/20/25 1332   Resp 21 01/20/25 1332   SpO2 100 % 01/20/25 1332   Vitals shown include unfiled device data.    Electronically Signed By: HOWARD STOLL CRNA  January 20, 2025  1:33 PM

## 2025-01-20 NOTE — ANESTHESIA PREPROCEDURE EVALUATION
Anesthesia Pre-Procedure Evaluation    Patient: Mundo Kern   MRN: [id number] : 1975        Procedure : Procedure(s):  minimally invasive aortic valve debridement POSSIBLE AORTIC VALVE REPLACMENT          Past Medical History:   Diagnosis Date     Anxiety      Aortic valve mass      Atrial fibrillation (H)      Cerebrovascular accident (H)      Migraine       Past Surgical History:   Procedure Laterality Date     TRANSESOPHAGEAL ECHOCARDIOGRAM INTRAOPERATIVE N/A 2024    Procedure: Transesophageal echocardiogram intraoperative;  Surgeon: GENERIC ANESTHESIA PROVIDER;  Location: [facility] OR      No Known Allergies   Social History     Tobacco Use     Smoking status: Former     Types: Cigarettes     Smokeless tobacco: Never   Substance Use Topics     Alcohol use: Not Currently     Comment: 6 pack of beer 3-4x per week      Wt Readings from Last 1 Encounters:   25 85.3 kg (188 lb)        Anesthesia Evaluation   Pt has had prior anesthetic.     No history of anesthetic complications       ROS/MED HX  ENT/Pulmonary:     (+)                tobacco use, Current use,                       Neurologic:     (+)          CVA,    TIA,                  Cardiovascular: Comment: A mobile large linear echodensity (15mm) is noted attached to the left  ventricular aspect of the aortic valve prolapsing into the aortic root.  Differential includes large degenerative strand versus early fibroblastoma.  vegetation and clot less likely but not entirely excluded. Clinical  correlation advised.    (+)  - -   -  - -                           valvular problems/murmurs           METS/Exercise Tolerance:     Hematologic:       Musculoskeletal:       GI/Hepatic:    (-) GERD   Renal/Genitourinary:       Endo:    (-) Type II DM   Psychiatric/Substance Use:       Infectious Disease:       Malignancy:       Other:            Physical Exam    Airway        Mallampati: II   TM distance: > 3 FB   Neck ROM: full   Mouth opening: > 3  "cm    Respiratory Devices and Support         Dental       (+) Modest Abnormalities - crowns, retainers, 1 or 2 missing teeth      Cardiovascular          Rhythm and rate: regular and normal     Pulmonary           breath sounds clear to auscultation       OUTSIDE LABS:  CBC:   Lab Results   Component Value Date    WBC 8.6 12/13/2024    WBC 8.6 12/12/2024    HGB 16.2 12/13/2024    HGB 16.6 12/12/2024    HCT 45.8 12/13/2024    HCT 47.0 12/12/2024     12/13/2024     12/12/2024     BMP:   Lab Results   Component Value Date     01/06/2025     12/13/2024    POTASSIUM 4.1 01/06/2025    POTASSIUM 4.0 12/13/2024    CHLORIDE 106 01/06/2025    CHLORIDE 107 12/13/2024    CO2 25 01/06/2025    CO2 22 12/13/2024    BUN 9.2 01/06/2025    BUN 8.0 12/13/2024    CR 1.05 01/06/2025    CR 0.92 12/13/2024     (H) 01/06/2025     (H) 12/13/2024     COAGS:   Lab Results   Component Value Date    PTT 26 07/22/2023    INR 1.03 12/13/2024     POC: No results found for: \"BGM\", \"HCG\", \"HCGS\"  HEPATIC:   Lab Results   Component Value Date    ALBUMIN 4.2 01/06/2025    PROTTOTAL 6.5 01/06/2025    ALT 43 01/06/2025    AST 24 01/06/2025    ALKPHOS 90 01/06/2025    BILITOTAL 0.3 01/06/2025     OTHER:   Lab Results   Component Value Date    A1C 5.3 12/09/2024    SOFI 9.4 01/06/2025    TSH 2.32 07/22/2023    SED 4 12/13/2024       Anesthesia Plan    ASA Status:  3    NPO Status:  NPO Appropriate    Anesthesia Type: General.     - Airway: ETT         Techniques and Equipment:     - Lines/Monitors: Arterial Line, Central Line, CVP, PAC, NIRS, MARIA TERESA            MARIA TERESA Absolute Contra-indication: NONE            MARIA TERESA Relative Contra-indication: NONE     - Blood: Blood in Room     - Drips/Meds: Epinephrine     Consents    Anesthesia Plan(s) and associated risks, benefits, and realistic alternatives discussed. Questions answered and patient/representative(s) expressed understanding.     - Discussed: Risks, Benefits and " "Alternatives for BOTH SEDATION and the PROCEDURE were discussed     - Discussed with:  Patient            Postoperative Care    Pain management: Multi-modal analgesia.        Comments:             Natividad Chase MD    I have reviewed the pertinent notes and labs in the chart from the past 30 days and (re)examined the patient.  Any updates or changes from those notes are reflected in this note.             # Drug Induced Platelet Defect: home medication list includes an antiplatelet medication             # Overweight: Estimated body mass index is 26.22 kg/m  as calculated from the following:    Height as of this encounter: 1.803 m (5' 11\").    Weight as of this encounter: 85.3 kg (188 lb).       # Financial/Environmental Concerns:          "

## 2025-01-20 NOTE — ANESTHESIA PROCEDURE NOTES
Airway       Patient location during procedure: OR       Procedure Start/Stop Times: 1/20/2025 9:33 AM  Staff -        Performed By: CRNAIndications and Patient Condition       Indications for airway management: [place]-procedural         Mask difficulty assessment: 1 - vent by mask    Final Airway Details       Final airway type: endotracheal airway       Successful airway: ETT - single  Endotracheal Airway Details        ETT size (mm): 8.0       Successful intubation technique: video laryngoscopy       VL Blade Size: [place] 4       Grade View of Cords: 2       Adjucts: stylet       Position: Right       Measured from: lips       Secured at (cm): 22       Bite block used: None    Post intubation assessment        Placement verified by: capnometry and equal breath sounds        Number of attempts at approach: 1       Secured with: tape       Ease of procedure: easy       Dentition: Intact and Unchanged    Medication(s) Administered   Medication Administration Time: 1/20/2025 9:33 AM

## 2025-01-21 ENCOUNTER — APPOINTMENT (OUTPATIENT)
Dept: PHYSICAL THERAPY | Facility: CLINIC | Age: 50
DRG: 228 | End: 2025-01-21
Attending: PHYSICIAN ASSISTANT
Payer: COMMERCIAL

## 2025-01-21 ENCOUNTER — APPOINTMENT (OUTPATIENT)
Dept: PHYSICAL THERAPY | Facility: CLINIC | Age: 50
DRG: 228 | End: 2025-01-21
Attending: SURGERY
Payer: COMMERCIAL

## 2025-01-21 ENCOUNTER — APPOINTMENT (OUTPATIENT)
Dept: GENERAL RADIOLOGY | Facility: CLINIC | Age: 50
DRG: 228 | End: 2025-01-21
Attending: PHYSICIAN ASSISTANT
Payer: COMMERCIAL

## 2025-01-21 DIAGNOSIS — Z98.890 S/P AORTIC VALVE REPAIR: Primary | ICD-10-CM

## 2025-01-21 LAB
ALBUMIN SERPL BCG-MCNC: 4 G/DL (ref 3.5–5.2)
ALP SERPL-CCNC: 68 U/L (ref 40–150)
ALT SERPL W P-5'-P-CCNC: 30 U/L (ref 0–70)
ANION GAP SERPL CALCULATED.3IONS-SCNC: 9 MMOL/L (ref 7–15)
AST SERPL W P-5'-P-CCNC: 33 U/L (ref 0–45)
ATRIAL RATE - MUSE: 114 BPM
BILIRUB SERPL-MCNC: 0.5 MG/DL
BUN SERPL-MCNC: 9.4 MG/DL (ref 6–20)
CA-I BLD-MCNC: 4.5 MG/DL (ref 4.4–5.2)
CALCIUM SERPL-MCNC: 8.6 MG/DL (ref 8.8–10.4)
CHLORIDE SERPL-SCNC: 105 MMOL/L (ref 98–107)
CREAT SERPL-MCNC: 0.88 MG/DL (ref 0.67–1.17)
DIASTOLIC BLOOD PRESSURE - MUSE: NORMAL MMHG
EGFRCR SERPLBLD CKD-EPI 2021: >90 ML/MIN/1.73M2
ERYTHROCYTE [DISTWIDTH] IN BLOOD BY AUTOMATED COUNT: 12.3 % (ref 10–15)
GLUCOSE BLDC GLUCOMTR-MCNC: 105 MG/DL (ref 70–99)
GLUCOSE BLDC GLUCOMTR-MCNC: 107 MG/DL (ref 70–99)
GLUCOSE BLDC GLUCOMTR-MCNC: 119 MG/DL (ref 70–99)
GLUCOSE BLDC GLUCOMTR-MCNC: 121 MG/DL (ref 70–99)
GLUCOSE BLDC GLUCOMTR-MCNC: 132 MG/DL (ref 70–99)
GLUCOSE BLDC GLUCOMTR-MCNC: 86 MG/DL (ref 70–99)
GLUCOSE SERPL-MCNC: 151 MG/DL (ref 70–99)
HCO3 SERPL-SCNC: 24 MMOL/L (ref 22–29)
HCT VFR BLD AUTO: 39.4 % (ref 40–53)
HGB BLD-MCNC: 13.8 G/DL (ref 13.3–17.7)
INTERPRETATION ECG - MUSE: NORMAL
MAGNESIUM SERPL-MCNC: 1.8 MG/DL (ref 1.7–2.3)
MCH RBC QN AUTO: 30.2 PG (ref 26.5–33)
MCHC RBC AUTO-ENTMCNC: 35 G/DL (ref 31.5–36.5)
MCV RBC AUTO: 86 FL (ref 78–100)
P AXIS - MUSE: 65 DEGREES
PHOSPHATE SERPL-MCNC: 3.7 MG/DL (ref 2.5–4.5)
PLATELET # BLD AUTO: 241 10E3/UL (ref 150–450)
POTASSIUM SERPL-SCNC: 3.7 MMOL/L (ref 3.4–5.3)
PR INTERVAL - MUSE: 180 MS
PROT SERPL-MCNC: 6.1 G/DL (ref 6.4–8.3)
QRS DURATION - MUSE: 102 MS
QT - MUSE: 328 MS
QTC - MUSE: 452 MS
R AXIS - MUSE: -12 DEGREES
RBC # BLD AUTO: 4.57 10E6/UL (ref 4.4–5.9)
SODIUM SERPL-SCNC: 138 MMOL/L (ref 135–145)
SYSTOLIC BLOOD PRESSURE - MUSE: NORMAL MMHG
T AXIS - MUSE: 64 DEGREES
TROPONIN T SERPL HS-MCNC: 74 NG/L
VENTRICULAR RATE- MUSE: 114 BPM
WBC # BLD AUTO: 19.2 10E3/UL (ref 4–11)

## 2025-01-21 PROCEDURE — 82040 ASSAY OF SERUM ALBUMIN: CPT | Performed by: PHYSICIAN ASSISTANT

## 2025-01-21 PROCEDURE — 97110 THERAPEUTIC EXERCISES: CPT | Mod: GP

## 2025-01-21 PROCEDURE — 97530 THERAPEUTIC ACTIVITIES: CPT | Mod: GP | Performed by: PHYSICAL THERAPIST

## 2025-01-21 PROCEDURE — 258N000003 HC RX IP 258 OP 636: Performed by: PHYSICIAN ASSISTANT

## 2025-01-21 PROCEDURE — 83735 ASSAY OF MAGNESIUM: CPT | Performed by: PHYSICIAN ASSISTANT

## 2025-01-21 PROCEDURE — 250N000013 HC RX MED GY IP 250 OP 250 PS 637: Performed by: PHYSICIAN ASSISTANT

## 2025-01-21 PROCEDURE — 250N000011 HC RX IP 250 OP 636: Performed by: SURGERY

## 2025-01-21 PROCEDURE — 97161 PT EVAL LOW COMPLEX 20 MIN: CPT | Mod: GP | Performed by: PHYSICAL THERAPIST

## 2025-01-21 PROCEDURE — 84484 ASSAY OF TROPONIN QUANT: CPT | Performed by: INTERNAL MEDICINE

## 2025-01-21 PROCEDURE — 85027 COMPLETE CBC AUTOMATED: CPT | Performed by: PHYSICIAN ASSISTANT

## 2025-01-21 PROCEDURE — 120N000013 HC R&B IMCU

## 2025-01-21 PROCEDURE — 97110 THERAPEUTIC EXERCISES: CPT | Mod: GP | Performed by: PHYSICAL THERAPIST

## 2025-01-21 PROCEDURE — 250N000009 HC RX 250: Performed by: SURGERY

## 2025-01-21 PROCEDURE — 82330 ASSAY OF CALCIUM: CPT | Performed by: PHYSICIAN ASSISTANT

## 2025-01-21 PROCEDURE — 93005 ELECTROCARDIOGRAM TRACING: CPT

## 2025-01-21 PROCEDURE — 250N000011 HC RX IP 250 OP 636: Performed by: INTERNAL MEDICINE

## 2025-01-21 PROCEDURE — 82310 ASSAY OF CALCIUM: CPT | Performed by: PHYSICIAN ASSISTANT

## 2025-01-21 PROCEDURE — 71045 X-RAY EXAM CHEST 1 VIEW: CPT

## 2025-01-21 PROCEDURE — 250N000011 HC RX IP 250 OP 636: Performed by: PHYSICIAN ASSISTANT

## 2025-01-21 PROCEDURE — 84100 ASSAY OF PHOSPHORUS: CPT | Performed by: PHYSICIAN ASSISTANT

## 2025-01-21 PROCEDURE — 97530 THERAPEUTIC ACTIVITIES: CPT | Mod: GP

## 2025-01-21 PROCEDURE — 250N000013 HC RX MED GY IP 250 OP 250 PS 637: Performed by: INTERNAL MEDICINE

## 2025-01-21 RX ORDER — METOPROLOL TARTRATE 1 MG/ML
5 INJECTION, SOLUTION INTRAVENOUS ONCE
Status: COMPLETED | OUTPATIENT
Start: 2025-01-21 | End: 2025-01-21

## 2025-01-21 RX ORDER — GABAPENTIN 100 MG/1
100 CAPSULE ORAL 3 TIMES DAILY
Status: DISCONTINUED | OUTPATIENT
Start: 2025-01-21 | End: 2025-01-23 | Stop reason: HOSPADM

## 2025-01-21 RX ORDER — KETOROLAC TROMETHAMINE 15 MG/ML
15 INJECTION, SOLUTION INTRAMUSCULAR; INTRAVENOUS EVERY 6 HOURS
Status: COMPLETED | OUTPATIENT
Start: 2025-01-21 | End: 2025-01-22

## 2025-01-21 RX ORDER — HYDRALAZINE HYDROCHLORIDE 20 MG/ML
10 INJECTION INTRAMUSCULAR; INTRAVENOUS EVERY 30 MIN PRN
Status: DISCONTINUED | OUTPATIENT
Start: 2025-01-21 | End: 2025-01-23 | Stop reason: HOSPADM

## 2025-01-21 RX ORDER — HYDRALAZINE HYDROCHLORIDE 20 MG/ML
20 INJECTION INTRAMUSCULAR; INTRAVENOUS EVERY 30 MIN PRN
Status: DISCONTINUED | OUTPATIENT
Start: 2025-01-21 | End: 2025-01-21

## 2025-01-21 RX ORDER — KETOROLAC TROMETHAMINE 15 MG/ML
15 INJECTION, SOLUTION INTRAMUSCULAR; INTRAVENOUS EVERY 6 HOURS
Status: DISCONTINUED | OUTPATIENT
Start: 2025-01-21 | End: 2025-01-21

## 2025-01-21 RX ORDER — LIDOCAINE 4 G/G
1-2 PATCH TOPICAL
Status: DISCONTINUED | OUTPATIENT
Start: 2025-01-22 | End: 2025-01-23 | Stop reason: HOSPADM

## 2025-01-21 RX ORDER — METHOCARBAMOL 500 MG/1
1000 TABLET, FILM COATED ORAL 4 TIMES DAILY
Status: DISCONTINUED | OUTPATIENT
Start: 2025-01-21 | End: 2025-01-23 | Stop reason: HOSPADM

## 2025-01-21 RX ADMIN — GABAPENTIN 100 MG: 100 CAPSULE ORAL at 17:47

## 2025-01-21 RX ADMIN — ACETAMINOPHEN 975 MG: 325 TABLET, FILM COATED ORAL at 21:09

## 2025-01-21 RX ADMIN — METHOCARBAMOL 1000 MG: 500 TABLET ORAL at 14:03

## 2025-01-21 RX ADMIN — OXYCODONE HYDROCHLORIDE 5 MG: 5 TABLET ORAL at 12:34

## 2025-01-21 RX ADMIN — HEPARIN SODIUM 5000 UNITS: 5000 INJECTION, SOLUTION INTRAVENOUS; SUBCUTANEOUS at 21:09

## 2025-01-21 RX ADMIN — HYDROMORPHONE HYDROCHLORIDE 0.4 MG: 0.2 INJECTION, SOLUTION INTRAMUSCULAR; INTRAVENOUS; SUBCUTANEOUS at 09:16

## 2025-01-21 RX ADMIN — HEPARIN SODIUM 5000 UNITS: 5000 INJECTION, SOLUTION INTRAVENOUS; SUBCUTANEOUS at 12:34

## 2025-01-21 RX ADMIN — HYDROMORPHONE HYDROCHLORIDE 0.4 MG: 0.2 INJECTION, SOLUTION INTRAMUSCULAR; INTRAVENOUS; SUBCUTANEOUS at 04:39

## 2025-01-21 RX ADMIN — PROCHLORPERAZINE EDISYLATE 10 MG: 5 INJECTION INTRAMUSCULAR; INTRAVENOUS at 01:07

## 2025-01-21 RX ADMIN — METOPROLOL TARTRATE 12.5 MG: 25 TABLET, FILM COATED ORAL at 21:09

## 2025-01-21 RX ADMIN — METOPROLOL TARTRATE 12.5 MG: 25 TABLET, FILM COATED ORAL at 09:03

## 2025-01-21 RX ADMIN — HYDRALAZINE HYDROCHLORIDE 10 MG: 20 INJECTION INTRAMUSCULAR; INTRAVENOUS at 00:31

## 2025-01-21 RX ADMIN — KETOROLAC TROMETHAMINE 15 MG: 15 INJECTION, SOLUTION INTRAMUSCULAR; INTRAVENOUS at 21:09

## 2025-01-21 RX ADMIN — CEFAZOLIN SODIUM 2 G: 2 INJECTION, SOLUTION INTRAVENOUS at 01:29

## 2025-01-21 RX ADMIN — ACETAMINOPHEN 975 MG: 325 TABLET, FILM COATED ORAL at 05:36

## 2025-01-21 RX ADMIN — VANCOMYCIN HYDROCHLORIDE 1500 MG: 10 INJECTION, POWDER, LYOPHILIZED, FOR SOLUTION INTRAVENOUS at 07:40

## 2025-01-21 RX ADMIN — GABAPENTIN 100 MG: 100 CAPSULE ORAL at 21:08

## 2025-01-21 RX ADMIN — OXYCODONE HYDROCHLORIDE 10 MG: 5 TABLET ORAL at 03:42

## 2025-01-21 RX ADMIN — HYDROMORPHONE HYDROCHLORIDE 0.4 MG: 0.2 INJECTION, SOLUTION INTRAMUSCULAR; INTRAVENOUS; SUBCUTANEOUS at 01:28

## 2025-01-21 RX ADMIN — SENNOSIDES AND DOCUSATE SODIUM 1 TABLET: 50; 8.6 TABLET ORAL at 10:06

## 2025-01-21 RX ADMIN — CEFAZOLIN SODIUM 2 G: 2 INJECTION, SOLUTION INTRAVENOUS at 10:09

## 2025-01-21 RX ADMIN — GABAPENTIN 100 MG: 100 CAPSULE ORAL at 10:07

## 2025-01-21 RX ADMIN — ATORVASTATIN CALCIUM 40 MG: 40 TABLET, FILM COATED ORAL at 21:08

## 2025-01-21 RX ADMIN — METHOCARBAMOL 1000 MG: 500 TABLET ORAL at 21:08

## 2025-01-21 RX ADMIN — Medication 1 TABLET: at 10:05

## 2025-01-21 RX ADMIN — PANTOPRAZOLE SODIUM 40 MG: 40 TABLET, DELAYED RELEASE ORAL at 10:07

## 2025-01-21 RX ADMIN — SENNOSIDES AND DOCUSATE SODIUM 1 TABLET: 50; 8.6 TABLET ORAL at 21:08

## 2025-01-21 RX ADMIN — THIAMINE HCL TAB 100 MG 100 MG: 100 TAB at 10:07

## 2025-01-21 RX ADMIN — HYDROXYZINE HYDROCHLORIDE 25 MG: 25 TABLET, FILM COATED ORAL at 03:16

## 2025-01-21 RX ADMIN — HYDRALAZINE HYDROCHLORIDE 20 MG: 20 INJECTION INTRAMUSCULAR; INTRAVENOUS at 02:40

## 2025-01-21 RX ADMIN — METHOCARBAMOL 1000 MG: 500 TABLET ORAL at 10:06

## 2025-01-21 RX ADMIN — HYDROMORPHONE HYDROCHLORIDE 0.4 MG: 0.2 INJECTION, SOLUTION INTRAMUSCULAR; INTRAVENOUS; SUBCUTANEOUS at 06:30

## 2025-01-21 RX ADMIN — ACETAMINOPHEN 975 MG: 325 TABLET, FILM COATED ORAL at 14:03

## 2025-01-21 RX ADMIN — POLYETHYLENE GLYCOL 3350 17 G: 17 POWDER, FOR SOLUTION ORAL at 10:08

## 2025-01-21 RX ADMIN — KETOROLAC TROMETHAMINE 15 MG: 15 INJECTION, SOLUTION INTRAMUSCULAR; INTRAVENOUS at 08:13

## 2025-01-21 RX ADMIN — ASPIRIN 325 MG ORAL TABLET 325 MG: 325 PILL ORAL at 10:05

## 2025-01-21 RX ADMIN — KETOROLAC TROMETHAMINE 15 MG: 15 INJECTION, SOLUTION INTRAMUSCULAR; INTRAVENOUS at 14:03

## 2025-01-21 RX ADMIN — METHOCARBAMOL 1000 MG: 500 TABLET ORAL at 17:47

## 2025-01-21 RX ADMIN — FOLIC ACID 1 MG: 1 TABLET ORAL at 10:06

## 2025-01-21 RX ADMIN — OXYCODONE HYDROCHLORIDE 10 MG: 5 TABLET ORAL at 08:44

## 2025-01-21 RX ADMIN — HYDRALAZINE HYDROCHLORIDE 20 MG: 20 INJECTION INTRAMUSCULAR; INTRAVENOUS at 07:39

## 2025-01-21 RX ADMIN — METOPROLOL TARTRATE 5 MG: 5 INJECTION INTRAVENOUS at 03:24

## 2025-01-21 ASSESSMENT — ACTIVITIES OF DAILY LIVING (ADL)
ADLS_ACUITY_SCORE: 49
ADLS_ACUITY_SCORE: 43
ADLS_ACUITY_SCORE: 49
ADLS_ACUITY_SCORE: 48
ADLS_ACUITY_SCORE: 48
ADLS_ACUITY_SCORE: 49
ADLS_ACUITY_SCORE: 48
ADLS_ACUITY_SCORE: 49
ADLS_ACUITY_SCORE: 48
ADLS_ACUITY_SCORE: 49
ADLS_ACUITY_SCORE: 43
ADLS_ACUITY_SCORE: 48
ADLS_ACUITY_SCORE: 48
ADLS_ACUITY_SCORE: 43
ADLS_ACUITY_SCORE: 43
ADLS_ACUITY_SCORE: 49
ADLS_ACUITY_SCORE: 49

## 2025-01-21 NOTE — PLAN OF CARE
"Goal Outcome Evaluation:      Plan of Care Reviewed With: patient, sibling, significant other, parent    Overall Patient Progress: improvingOverall Patient Progress: improving       Neuro: AXOX4. Up in the chair with 1-2 person assist. Pain controlled with prn and scheduled meds.    CV: SR-ST, BP wnl    Pulm: RA, chest tubes to -[id number] suction, small drainage. IS upto 1250ml    GI/: diet advanced, hypoactive BS. No BM, scheduled stool softeners given. Velazquez with good UOP    Skin: right chest and right groin incision DANIELLE.     Lines: PIV X1    Restraints: NA     Plan: SBAR given to [facility] RN, pt transferred via wheelchair, all belonging sent with pt. Care ongoing         Problem: Adult Inpatient Plan of Care  Goal: Plan of Care Review  Description: The Plan of Care Review/Shift note should be completed every shift.  The Outcome Evaluation is a brief statement about your assessment that the patient is improving, declining, or no change.  This information will be displayed automatically on your shift  note.  Outcome: Progressing  Flowsheets (Taken 1/21/2025 1443)  Plan of Care Reviewed With:   patient   sibling   significant other   parent  Overall Patient Progress: improving  Goal: Patient-Specific Goal (Individualized)  Description: You can add care plan individualizations to a care plan. Examples of Individualization might be:  \"Parent requests to be called daily at 9am for status\", \"I have a hard time hearing out of my right ear\", or \"Do not touch me to wake me up as it startles  me\".  Outcome: Progressing  Goal: Absence of Hospital-Acquired Illness or Injury  Outcome: Progressing  Intervention: Identify and Manage Fall Risk  Recent Flowsheet Documentation  Taken 1/21/2025 1242 by Juan J Platt RN  Safety Promotion/Fall Prevention:   activity supervised   assistive device/personal items within reach   clutter free environment maintained   increased rounding and observation   increase visualization of " patient   lighting adjusted   room door open   room near nurse's station   room organization consistent   safety round/check completed   supervised activity   treat reversible contributory factors  Taken 1/21/2025 0800 by Juan J Platt RN  Safety Promotion/Fall Prevention:   activity supervised   assistive device/personal items within reach   clutter free environment maintained   increased rounding and observation   increase visualization of patient   lighting adjusted   room door open   room near nurse's station   room organization consistent   safety round/check completed   supervised activity   treat reversible contributory factors  Intervention: Prevent Skin Injury  Recent Flowsheet Documentation  Taken 1/21/2025 1242 by Juan J Platt RN  Body Position:   position changed independently   turned   left   upper extremity elevated  Taken 1/21/2025 1100 by Juan J Platt RN  Body Position:   turned   heels elevated   upper extremity elevated   left   head repositioned, left  Intervention: Prevent and Manage VTE (Venous Thromboembolism) Risk  Recent Flowsheet Documentation  Taken 1/21/2025 1242 by Juan J Platt RN  VTE Prevention/Management: SCDs on (sequential compression devices)  Taken 1/21/2025 0800 by Juan J Platt RN  VTE Prevention/Management: SCDs on (sequential compression devices)  Intervention: Prevent Infection  Recent Flowsheet Documentation  Taken 1/21/2025 1242 by Juan J Platt RN  Infection Prevention:   environmental surveillance performed   equipment surfaces disinfected   hand hygiene promoted   personal protective equipment utilized   rest/sleep promoted   single patient room provided  Taken 1/21/2025 0800 by Juan J Platt RN  Infection Prevention:   environmental surveillance performed   equipment surfaces disinfected   hand hygiene promoted   personal protective equipment utilized   rest/sleep promoted   single patient room provided  Goal: Optimal Comfort and Wellbeing  Outcome:  Progressing  Intervention: Monitor Pain and Promote Comfort  Recent Flowsheet Documentation  Taken 1/21/2025 1242 by Juan J Platt RN  Pain Management Interventions: rest  Taken 1/21/2025 1100 by Juan J Platt RN  Pain Management Interventions: rest  Taken 1/21/2025 1007 by Juan J Platt RN  Pain Management Interventions: rest  Taken 1/21/2025 0930 by Juan J Platt RN  Pain Management Interventions: rest  Taken 1/21/2025 0844 by Juan J Platt RN  Pain Management Interventions: medication (see MAR)  Taken 1/21/2025 0830 by Juan J Platt RN  Pain Management Interventions: rest  Taken 1/21/2025 0813 by Juan J Platt RN  Pain Management Interventions: medication (see MAR)  Taken 1/21/2025 0800 by Juan J Platt RN  Pain Management Interventions: rest  Intervention: Provide Person-Centered Care  Recent Flowsheet Documentation  Taken 1/21/2025 1242 by Juan J Platt RN  Trust Relationship/Rapport:   care explained   choices provided   emotional support provided   empathic listening provided   questions answered   questions encouraged   reassurance provided   thoughts/feelings acknowledged  Taken 1/21/2025 1100 by Juan J Platt RN  Trust Relationship/Rapport:   care explained   questions answered  Taken 1/21/2025 0800 by Juan J Platt RN  Trust Relationship/Rapport:   care explained   choices provided   emotional support provided   empathic listening provided   questions answered   questions encouraged   reassurance provided   thoughts/feelings acknowledged  Goal: Readiness for Transition of Care  Outcome: Progressing     Problem: Risk for Delirium  Goal: Optimal Coping  Outcome: Progressing  Intervention: Optimize Psychosocial Adjustment to Delirium  Recent Flowsheet Documentation  Taken 1/21/2025 1242 by Juan J Platt RN  Supportive Measures:   active listening utilized   positive reinforcement provided  Taken 1/21/2025 1100 by Juan J Platt RN  Family/Support System Care:   involvement promoted    presence promoted   support provided  Taken 1/21/2025 0800 by Juan J Platt RN  Supportive Measures:   active listening utilized   positive reinforcement provided  Goal: Improved Behavioral Control  Outcome: Progressing  Intervention: Prevent and Manage Agitation  Recent Flowsheet Documentation  Taken 1/21/2025 1242 by Juan J Platt RN  Environment Familiarity/Consistency: daily routine followed  Taken 1/21/2025 0800 by Juan J Platt RN  Environment Familiarity/Consistency: daily routine followed  Intervention: Minimize Safety Risk  Recent Flowsheet Documentation  Taken 1/21/2025 1242 by Juan J Platt RN  Enhanced Safety Measures:   pain management   monitor patients coagulation values   assistive devices when indicated   patient/family teach back on injury risk   Pre/Post Op education on fall prevention   review medications for side effects with activity   room near unit station  Trust Relationship/Rapport:   care explained   choices provided   emotional support provided   empathic listening provided   questions answered   questions encouraged   reassurance provided   thoughts/feelings acknowledged  Taken 1/21/2025 1100 by Juan J Platt RN  Trust Relationship/Rapport:   care explained   questions answered  Taken 1/21/2025 0800 by Juan J Platt RN  Enhanced Safety Measures:   pain management   monitor patients coagulation values   assistive devices when indicated   patient/family teach back on injury risk   Pre/Post Op education on fall prevention   review medications for side effects with activity   room near unit station  Trust Relationship/Rapport:   care explained   choices provided   emotional support provided   empathic listening provided   questions answered   questions encouraged   reassurance provided   thoughts/feelings acknowledged  Goal: Improved Attention and Thought Clarity  Outcome: Progressing  Intervention: Maximize Cognitive Function  Recent Flowsheet Documentation  Taken 1/21/2025 1242  by Dechen, Juan J, RN  Sensory Stimulation Regulation:   care clustered   lighting decreased   quiet environment promoted  Reorientation Measures:   calendar in view   clock in view   reorientation provided  Taken 1/21/2025 0800 by Juan J Platt RN  Sensory Stimulation Regulation:   care clustered   lighting decreased   quiet environment promoted  Reorientation Measures:   calendar in view   clock in view   reorientation provided  Goal: Improved Sleep  Outcome: Progressing  Intervention: Promote Sleep  Recent Flowsheet Documentation  Taken 1/21/2025 1242 by Juan J Platt RN  Sleep/Rest Enhancement:   awakenings minimized   comfort measures   consistent schedule promoted   noise level reduced   regular sleep/rest pattern promoted   relaxation techniques promoted   therapeutic touch utilized  Taken 1/21/2025 0800 by Juan J Platt RN  Sleep/Rest Enhancement:   awakenings minimized   comfort measures   consistent schedule promoted   noise level reduced   regular sleep/rest pattern promoted   relaxation techniques promoted   therapeutic touch utilized     Problem: Cardiovascular Surgery  Goal: Improved Activity Tolerance  Outcome: Progressing  Intervention: Optimize Tolerance for Activity  Recent Flowsheet Documentation  Taken 1/21/2025 1242 by Juan J Platt RN  Environmental Support: calm environment promoted  Taken 1/21/2025 0800 by Juan J Platt RN  Environmental Support: calm environment promoted  Goal: Optimal Coping with Heart Surgery  Outcome: Progressing  Intervention: Support Psychosocial Response to Surgery  Recent Flowsheet Documentation  Taken 1/21/2025 1242 by Juan J Platt RN  Supportive Measures:   active listening utilized   positive reinforcement provided  Taken 1/21/2025 1100 by Juan J Platt RN  Family/Support System Care:   involvement promoted   presence promoted   support provided  Taken 1/21/2025 0800 by Juna J Platt RN  Supportive Measures:   active listening utilized   positive  reinforcement provided  Goal: Absence of Bleeding  Outcome: Progressing  Intervention: Monitor and Manage Bleeding  Recent Flowsheet Documentation  Taken 1/21/2025 1242 by Juan J Platt RN  Bleeding Management: dressing monitored  Taken 1/21/2025 0800 by Juan J Platt RN  Bleeding Management: dressing monitored  Goal: Effective Bowel Elimination  Outcome: Progressing  Goal: Effective Cardiac Function  Outcome: Progressing  Goal: Optimal Cerebral Tissue Perfusion  Outcome: Progressing  Intervention: Protect and Optimize Cerebral Perfusion  Recent Flowsheet Documentation  Taken 1/21/2025 1242 by Juan J Platt RN  Sensory Stimulation Regulation:   care clustered   lighting decreased   quiet environment promoted  Cerebral Perfusion Promotion: blood pressure monitored  Glycemic Management: blood glucose monitored  Head of Bed (HOB) Positioning: HOB at 30-45 degrees  Taken 1/21/2025 1100 by Juan J Platt RN  Head of Bed (HOB) Positioning: HOB at 30-45 degrees  Taken 1/21/2025 0800 by Juan J Platt RN  Sensory Stimulation Regulation:   care clustered   lighting decreased   quiet environment promoted  Cerebral Perfusion Promotion: blood pressure monitored  Glycemic Management: blood glucose monitored  Head of Bed (HOB) Positioning: HOB at 30-45 degrees  Goal: Fluid and Electrolyte Balance  Outcome: Progressing  Intervention: Monitor and Manage Fluid and Electrolyte Balance  Recent Flowsheet Documentation  Taken 1/21/2025 1242 by Juan J Platt RN  Fluid/Electrolyte Management: fluids provided  Taken 1/21/2025 0800 by Juan J Platt RN  Fluid/Electrolyte Management: fluids provided  Goal: Blood Glucose Level Within Targeted Range  Outcome: Progressing  Intervention: Optimize Glycemic Control  Recent Flowsheet Documentation  Taken 1/21/2025 1242 by Juan J Platt RN  Glycemic Management: blood glucose monitored  Taken 1/21/2025 0800 by Juan J Platt RN  Glycemic Management: blood glucose monitored  Goal:  Absence of Infection Signs and Symptoms  Outcome: Progressing  Intervention: Prevent or Manage Infection  Recent Flowsheet Documentation  Taken 1/21/2025 1242 by Juan J Platt RN  Infection Prevention:   environmental surveillance performed   equipment surfaces disinfected   hand hygiene promoted   personal protective equipment utilized   rest/sleep promoted   single patient room provided  Taken 1/21/2025 0800 by Juan J Platt RN  Infection Prevention:   environmental surveillance performed   equipment surfaces disinfected   hand hygiene promoted   personal protective equipment utilized   rest/sleep promoted   single patient room provided  Goal: Anesthesia/Sedation Recovery  Outcome: Progressing  Intervention: Optimize Anesthesia Recovery  Recent Flowsheet Documentation  Taken 1/21/2025 1242 by Juan J Platt RN  Safety Promotion/Fall Prevention:   activity supervised   assistive device/personal items within reach   clutter free environment maintained   increased rounding and observation   increase visualization of patient   lighting adjusted   room door open   room near nurse's station   room organization consistent   safety round/check completed   supervised activity   treat reversible contributory factors  Reorientation Measures:   calendar in view   clock in view   reorientation provided  Level Incentive Spirometer (mL): 1250  Number of Repetitions (IS): 5  Patient Tolerance (IS): good  Taken 1/21/2025 0800 by Juan J Platt RN  Safety Promotion/Fall Prevention:   activity supervised   assistive device/personal items within reach   clutter free environment maintained   increased rounding and observation   increase visualization of patient   lighting adjusted   room door open   room near nurse's station   room organization consistent   safety round/check completed   supervised activity   treat reversible contributory factors  Reorientation Measures:   calendar in view   clock in view   reorientation  provided  Patient Tolerance (IS): good  Goal: Acceptable Pain Control  Outcome: Progressing  Intervention: Prevent or Manage Pain  Recent Flowsheet Documentation  Taken 1/21/2025 1242 by Juan J Platt RN  Pain Management Interventions: rest  Taken 1/21/2025 1100 by Juan J Platt RN  Pain Management Interventions: rest  Taken 1/21/2025 1007 by Juan J Platt RN  Pain Management Interventions: rest  Taken 1/21/2025 0930 by Juan J Platt RN  Pain Management Interventions: rest  Taken 1/21/2025 0844 by Juan J Platt RN  Pain Management Interventions: medication (see MAR)  Taken 1/21/2025 0830 by Juan J Platt RN  Pain Management Interventions: rest  Taken 1/21/2025 0813 by Juan J Platt RN  Pain Management Interventions: medication (see MAR)  Taken 1/21/2025 0800 by Juan J Platt RN  Pain Management Interventions: rest  Goal: Nausea and Vomiting Relief  Outcome: Progressing  Goal: Effective Urinary Elimination  Outcome: Progressing  Goal: Effective Oxygenation and Ventilation  Outcome: Progressing  Intervention: Promote Airway Secretion Clearance  Recent Flowsheet Documentation  Taken 1/21/2025 1242 by Juan J Platt RN  Level Incentive Spirometer (mL): 1250  Cough And Deep Breathing: done with encouragement  Number of Repetitions (IS): 5  Patient Tolerance (IS): good  Taken 1/21/2025 0800 by Juan J Platt RN  Cough And Deep Breathing: done with encouragement  Patient Tolerance (IS): good  Intervention: Optimize Oxygenation and Ventilation  Recent Flowsheet Documentation  Taken 1/21/2025 1242 by Juan J Platt RN  Chest Tube Safety:   all connections secured   rubber-tipped hemostat(s) with patient   suction checked  Taken 1/21/2025 0800 by Juan J Platt RN  Chest Tube Safety:   all connections secured   rubber-tipped hemostat(s) with patient   suction checked

## 2025-01-21 NOTE — PROGRESS NOTES
CV  Pressors (which pressors and any increase/decrease in pressor needs): None  HR range: 90s-120s. SR-ST  Chest tube output: Total 230mL, ~10-25mL/hr.    Neuro  Orientation: A/O x4  Delirium present?(y/n): No  Sleep: No  Pain: Not controlled, in constant pain throughout shift despite giving PRNs around the clock.    GI/  BM? (y/n): No   Urine output: 30-50mL/hr.      Lines:  R) [place] w/ Introducer, L) Brachial Art line.

## 2025-01-21 NOTE — PROGRESS NOTES
Occupational Therapy: Orders received. Chart reviewed and discussed with care team.? Occupational Therapy not indicated due to pt with no skilled OT needs. Will be working with PT for CR.? Defer discharge recommendations to care team.? Will complete orders.

## 2025-01-21 NOTE — CONSULTS
Care Management Initial Consult    General Information  Assessment completed with: Patient, Mundo  Type of CM/SW Visit: Initial Assessment    Primary Care Provider verified and updated as needed: Yes   Readmission within the last 30 days: no previous admission in last 30 days      Reason for Consult: other (see comments) (Elevated Risk Score)  Advance Care Planning:            Communication Assessment  Patient's communication style: spoken language (English or Bilingual)    Hearing Difficulty or Deaf: no   Wear Glasses or Blind: yes    Cognitive  Cognitive/Neuro/Behavioral: WDL  Level of Consciousness: alert  Arousal Level: opens eyes spontaneously  Orientation: oriented x 4     Best Language: 0 - No aphasia  Speech: hoarse, spontaneous, logical    Living Environment:   People in home: significant other     Current living Arrangements: house (duplex)      Able to return to prior arrangements: yes       Family/Social Support:  Care provided by: self  Provides care for: no one  Marital Status: Single  Support system: Parent(s), Significant Other          Description of Support System: Supportive, Involved         Current Resources:   Patient receiving home care services: No        Community Resources: Other (see comment) (Outpt OT)  Equipment currently used at home: none  Supplies currently used at home: None    Employment/Financial:  Employment Status: unemployed        Financial Concerns: none   Referral to Financial Worker: No       Does the patient's insurance plan have a 3 day qualifying hospital stay waiver?  No    Lifestyle & Psychosocial Needs:  Social Drivers of Health     Food Insecurity: Low Risk  (1/20/2025)    Food Insecurity     Within the past 12 months, did you worry that your food would run out before you got money to buy more?: No     Within the past 12 months, did the food you bought just not last and you didn t have money to get more?: No   Depression: Not at risk (12/16/2024)    PHQ-2     PHQ-2  Score: 0   Housing Stability: Low Risk  (1/20/2025)    Housing Stability     Do you have housing? : Yes     Are you worried about losing your housing?: No   Tobacco Use: Medium Risk (1/14/2025)    Patient History     Smoking Tobacco Use: Former     Smokeless Tobacco Use: Never     Passive Exposure: Not on file   Financial Resource Strain: Low Risk  (1/20/2025)    Financial Resource Strain     Within the past 12 months, have you or your family members you live with been unable to get utilities (heat, electricity) when it was really needed?: No   Alcohol Use: Not on file   Transportation Needs: Low Risk  (1/20/2025)    Transportation Needs     Within the past 12 months, has lack of transportation kept you from medical appointments, getting your medicines, non-medical meetings or appointments, work, or from getting things that you need?: No   Physical Activity: Not on file   Interpersonal Safety: Low Risk  (1/20/2025)    Interpersonal Safety     Do you feel physically and emotionally safe where you currently live?: Yes     Within the past 12 months, have you been hit, slapped, kicked or otherwise physically hurt by someone?: No     Within the past 12 months, have you been humiliated or emotionally abused in other ways by your partner or ex-partner?: No   Stress: Not on file   Social Connections: Unknown (4/1/2024)    Received from [facility]    Social Connections     Frequency of Communication with Friends and Family: Not on file   Health Literacy: Not on file       Functional Status:  Prior to admission patient needed assistance:              Mental Health Status:          Chemical Dependency Status:                Values/Beliefs:  Spiritual, Cultural Beliefs, Sabianism Practices, Values that affect care: yes (Leela)  Description of Beliefs that Will Affect Care: Leela            Discussed  Partnership in Safe Discharge Planning  document with patient/family: No    Additional  Information:  Per consult for Elevated Risk Score, met with patient to discuss discharge plan.  Patient shared he was hospitalized in December of last year with a stroke and discharge home and was attending Outpatient OT.  Patient shared he lives in a duplex home with his S.O. and that his parents live below him.  Patient shared he had been using a walker or cane with ambulation, but just recently he has been able to ambulate without equipment.  Patient shared that after discharge, he will be staying at his parents that live below him and that they will be able to assist him and his S.O. will also be able to support him.  Discussed that PT/OT & CR will be evaluating patient for discharge recommendations.  Discussed Outpatient CR at discharge.    Per chart review, pt has outpt CR appt scheduled.  Next Steps: Await PT/OT/CR eval.  Care Management will continue to follow for discharge planning.     JENNI Vasquez RN, BSN, OCN   Inpatient Care Coordination [facility]  Office: [phone]

## 2025-01-21 NOTE — PLAN OF CARE
"Goal Outcome Evaluation:      Plan of Care Reviewed With: patient    Overall Patient Progress: improvingOverall Patient Progress: improving         CV  Pressors (which pressors and any increase/decrease in pressor needs): Low dose levo on and off. Off by end of shift. 250mL of LR given total.   HR range: 70-90  Chest tube output: 120mL    Neuro  Orientation: A&Ox4  Delirium present?(y/n): N  Sleep: N  Pain: Controlled with PRN dilaudid, oxy, scheduled Tylenol    GI/  BM? (y/n): N  Urine output: 575mL, trending down toward end of shift      Lines:  L brachial A line, R internal jugular CVC with Omid, introducer, PIV x1 L hand      Problem: Adult Inpatient Plan of Care  Goal: Plan of Care Review  Description: The Plan of Care Review/Shift note should be completed every shift.  The Outcome Evaluation is a brief statement about your assessment that the patient is improving, declining, or no change.  This information will be displayed automatically on your shift  note.  Outcome: Progressing  Flowsheets (Taken 1/20/2025 1830)  Plan of Care Reviewed With: patient  Overall Patient Progress: improving  Goal: Patient-Specific Goal (Individualized)  Description: You can add care plan individualizations to a care plan. Examples of Individualization might be:  \"Parent requests to be called daily at 9am for status\", \"I have a hard time hearing out of my right ear\", or \"Do not touch me to wake me up as it startles  me\".  Outcome: Progressing  Goal: Absence of Hospital-Acquired Illness or Injury  Outcome: Progressing  Intervention: Identify and Manage Fall Risk  Recent Flowsheet Documentation  Taken 1/20/2025 1600 by Fabio Angel, RN  Safety Promotion/Fall Prevention:   activity supervised   clutter free environment maintained   increase visualization of patient   lighting adjusted   patient and family education   nonskid shoes/slippers when out of bed   room door open   room near nurse's station   room organization " consistent   supervised activity  Intervention: Prevent Skin Injury  Recent Flowsheet Documentation  Taken 1/20/2025 1800 by Fabio Angel RN  Body Position:   left   turned   heels elevated   legs elevated   upper extremity elevated  Taken 1/20/2025 1600 by Fabio Angel RN  Body Position:   right   turned   heels elevated   legs elevated   upper extremity elevated  Intervention: Prevent and Manage VTE (Venous Thromboembolism) Risk  Recent Flowsheet Documentation  Taken 1/20/2025 1600 by Fabio Angel RN  VTE Prevention/Management: SCDs on (sequential compression devices)  Goal: Optimal Comfort and Wellbeing  Outcome: Progressing  Intervention: Provide Person-Centered Care  Recent Flowsheet Documentation  Taken 1/20/2025 1600 by Fabio Angel RN  Trust Relationship/Rapport:   care explained   choices provided   emotional support provided   empathic listening provided   questions answered   questions encouraged   reassurance provided   thoughts/feelings acknowledged  Goal: Readiness for Transition of Care  Outcome: Progressing     Problem: Risk for Delirium  Goal: Optimal Coping  Outcome: Progressing  Intervention: Optimize Psychosocial Adjustment to Delirium  Recent Flowsheet Documentation  Taken 1/20/2025 1600 by Fabio Angel RN  Supportive Measures:   active listening utilized   positive reinforcement provided  Goal: Improved Behavioral Control  Outcome: Progressing  Intervention: Minimize Safety Risk  Recent Flowsheet Documentation  Taken 1/20/2025 1600 by Fabio Angel RN  Enhanced Safety Measures: room near unit station  Trust Relationship/Rapport:   care explained   choices provided   emotional support provided   empathic listening provided   questions answered   questions encouraged   reassurance provided   thoughts/feelings acknowledged  Goal: Improved Attention and Thought Clarity  Outcome: Progressing  Goal: Improved Sleep  Outcome:  Progressing

## 2025-01-21 NOTE — PROGRESS NOTES
01/21/25 0850   Appointment Info   Signing Clinician's Name / Credentials (PT) Elda Zuluaga PT, DPT   Living Environment   People in Home parent(s);significant other  (Lives with a girlfriend in an upstairs duplex, pt's Mother lives in the lower level unit.)   Current Living Arrangements   (duplex)   Home Accessibility stairs to enter home   Number of Stairs, Main Entrance greater than 10 stairs   Stair Railings, Main Entrance railings safe and in good condition   Transportation Anticipated family or friend will provide   Living Environment Comments Reports all needs on one leve.   Self-Care   Usual Activity Tolerance good   Current Activity Tolerance moderate   Regular Exercise No   Equipment Currently Used at Home none   Fall history within last six months no   Activity/Exercise/Self-Care Comment Works on his feet lifting and shifting boxes.   General Information   Onset of Illness/Injury or Date of Surgery 01/20/25   Referring Physician Allison Gramajo PA-C   Patient/Family Therapy Goals Statement (PT) Plan is to stay on the lower level duplex wiht his Mom. States then he doesn't have to worry about stairs.   Pertinent History of Current Problem (include personal factors and/or comorbidities that impact the POC) 50 yo male POD#1 MINIMALLY INVASIVE EXCISION OF AORTIC VALVE MASS   Existing Precautions/Restrictions cardiac   Cognition   Affect/Mental Status (Cognition) WFL   Orientation Status (Cognition) oriented x 4   Follows Commands (Cognition) WFL   Cognitive Status Comments Pt with tangential conversation   Pain Assessment   Patient Currently in Pain Yes, see Vital Sign flowsheet  (R incision/access site)   Integumentary/Edema   Integumentary/Edema Comments B ankles with slight pitting at the elastic of socks.   Posture    Posture Forward head position;Protracted shoulders   Range of Motion (ROM)   ROM Comment BLEs WFL, B UES WFL, R UE is painful to move   Strength (Manual Muscle Testing)    Strength Comments Demonstrates antigravity strength with mobility,  impaired functional activity tolerance from baseline.   Bed Mobility   Comment, (Bed Mobility) Sup>sit Min A   Transfers   Comment, (Transfers) Sit>stand CGA   Gait/Stairs (Locomotion)   Comment, (Gait/Stairs) Bedside gait with CGA, moves slow, shuffles feet, downward gaze, slight trunk flexion.   Balance   Balance Comments Sitting balance fair, standing balance improved with UE support from therapist.   Sensory Examination   Sensory Perception Comments Denies n/t   Clinical Impression   Criteria for Skilled Therapeutic Intervention Yes, treatment indicated   PT Diagnosis (PT) Decreased functional activity tolerance   Influenced by the following impairments Post op fatigue, generalized weakness, impaired balance, pain   Functional limitations due to impairments Decreased functional independence with mobility.   Clinical Presentation (PT Evaluation Complexity) stable   Clinical Presentation Rationale see MR   Clinical Decision Making (Complexity) low complexity   Planned Therapy Interventions (PT) balance training;bed mobility training;gait training;home exercise program;neuromuscular re-education;patient/family education;ROM (range of motion);stair training;strengthening;stretching;transfer training;progressive activity/exercise;risk factor education;home program guidelines   Risk & Benefits of therapy have been explained evaluation/treatment results reviewed;care plan/treatment goals reviewed;risks/benefits reviewed;current/potential barriers reviewed;participants voiced agreement with care plan;participants included;patient   PT Total Evaluation Time   PT Eval, Low Complexity Minutes (84880) 10   Physical Therapy Goals   PT Frequency 2x/day   PT Predicted Duration/Target Date for Goal Attainment 01/25/25   PT Goals Bed Mobility;Transfers;Cardiac Phase 1   PT: Bed Mobility Independent;Supine to/from sit;Rolling   PT: Transfers Independent;Sit  to/from stand;Bed to/from chair   PT: Understanding of cardiac education to maximize quality of life, condition management, and health outcomes Patient;Caregiver;Verbalize;Demonstrate   PT: Perform aerobic activity with stable cardiovascular response continuous;15 minutes;ambulation;treadmill   PT: Functional/aerobic ambulation tolerance with stable cardiovascular response in order to return to home and community environment Independent;Greater than 300 feet  (No LOB with mild balance challenges)   PT: Navigation of stairs simulating home set up with stable cardiovascular response in order to return to home and community environment Supervision/SBA;Greater than 10 stairs;Rail on both sides   Interventions   Interventions Quick Adds Therapeutic Activity;Therapeutic Procedure;Cardiac Rehab   Therapeutic Procedure/Exercise   Ther. Procedure: strength, endurance, ROM, flexibillity Minutes (23480) 10   Treatment Detail/Skilled Intervention Edu pt on benefit of AROM for cirulcation and reduced stiffness.Completed supine APs, heel slides, seated LaQs and marching. Recomended completing AROm when up in the chair and periodically throughout the day. Edu on aerobic benefit, and exercise. Reviwed progression of exercise, including ambulation during LOS. Edu on OMNI and talk test with activity. Pt with some breathlessness secondary to pain with movement. Reviewed signs and symptoms of activity tolerance.   Therapeutic Activity   Therapeutic Activities: dynamic activities to improve functional performance Minutes (05878) 15   Treatment Detail/Skilled Intervention Pt on 1L via NC, saturations are 98%. Removed for mobility, pt sats remian >93%; throughut. VS assessed pre/during and post activity. Stable, but elevated. HR tachy, BP 140s systolic. RN aware, had already administered meds. Dangled x 5 min, edu on bracing self with pillow/splinting with cough to help wtih pain, use of PLB to better control breath and importance of  exhale vs breath holding. Stand pivot bed>chair CGA. All needs in reach, VSS, Alarm on and RN updated at PT exit.   Cardiac Rehab Phase II Plan   Phase II Order Received Yes   Phase II Appointment Status Scheduled   Date/Time 7:45 [facility] 1/30   PT Discharge Planning   PT Plan Gait in fraser, wc follow for first long distance.  Cardiac Surgery packet   PT Discharge Recommendation (DC Rec) home with outpatient cardiac rehab   PT Rationale for DC Rec Anticipate pt to be SBA or less with all functional mobility by the time of discharge. Pt will benefit from OP CR phase II for on going skilled monitoring and progression of exercise/activity tolerance as well as continued education for optimal heart health.   PT Brief overview of current status Currently Min A to CGA with mobility. Goals of therapy will be to address safe mobility and make recs for d/c to next level of care. Pt and RN will continue to follow all falls risk precautions as documented by RN staff while hospitalized.   PT Total Distance Amb During Session (feet) 5   Physical Therapy Time and Intention   Timed Code Treatment Minutes 25   Total Session Time (sum of timed and untimed services) 35

## 2025-01-21 NOTE — PROGRESS NOTES
[facility]  Cardiovascular and Thoracic Surgery Daily Note      Assessment and Plan  POD # 1 s/p right mini-thoracotomy endoscopic-assisted removal of intracardiac mass, right common femoral arterial and venous cannulation for cardiopulmonary bypass, on 01/20/2025 with Dr. Marlena Anne.    - CVS: Pre-op TTE with preserved biventricular function. Postop vasoplegic shock, resolved. Hypertensive (suspect component of poor pain control), start Lopressor 12.5 mg BID with hold parameters. Mild hypervolemia following volume resuscitation, trend UOP/BP and consider diuretic today. Surgical pathology 01/20 pending. Aspirin 81 mg daily, atorvastatin 40 mg daily. Chest tubes: output 350 ml, serosang, no air leak, continue today. TPW: capped.     - Resp: Postoperative mechanical ventilation, resolved. Extubated POD0. IS, pulmonary toilet. Recently quit smoking, encourage cessation.       - Neuro: Neuro intact, pain control suboptimal on current regimen.Scheduled Toradol, Tylenol, Robaxin, gabapentin, Lidoderm patches added. PRN oxycodone and Dilaudid. Ice PRN. History of acute ischemic CVA with multifocal L MCA infarct 07/2023, subacute right PCA infarct 04/2024, large acute to subacute right paramedian cerebellar infarct 12/2024; etiology of strokes presumed to be embolic from intracardiac mass. No residual deficits noted. History of heavy alcohol use, recently quit, CIWA in place.    - Renal: No history of significant renal disease. Cr stable WNL. Trend BMP. Diuretic as above.  Recent Labs   Lab 01/21/25  0412 01/20/25  1348 01/20/25  1225   CR 0.88 0.94 0.88       - GI: -BM, -flatus, continue bowel regimen    - : Velazquez in place, continue today.    - Endo: Postop stress hyperglycemia, improved. Insulin infusion transitioned to sliding scale insulin.   Hemoglobin A1C   Date Value Ref Range Status   01/20/2025 5.7 (H) <5.7 % Final     Comment:     Normal <5.7%   Prediabetes 5.7-6.4%    Diabetes 6.5% or  "higher     Note: Adopted from ADA consensus guidelines.        - FEN: Replace electrolytes as needed. Regular diet.     - ID: Postop leukocytosis. Tmax 100.6. WBC 19.2. Periop abx prophylaxis complete. Trend CBC and fever curve.   Recent Labs   Lab 01/21/25  0412 01/20/25  1348 01/20/25  1225   WBC 19.2* 16.8* 11.6*       - Heme: Acute blood loss anemia due to surgery. Hgb and PLT stable. Trend CBC, transfuse PRN.   Recent Labs   Lab 01/21/25  0412 01/20/25  1348 01/20/25  1250 01/20/25  1230 01/20/25  1225   HGB 13.8 13.1* 12.4*   < > 12.4*    196  --   --  155    < > = values in this interval not displayed.       - Proph: SCD, subcutaneous heparin, PPI    - Other:  Clinically Significant Risk Factors          # Hyperchloremia: Highest Cl = 112 mmol/L in last 2 days, will monitor as appropriate      # Hypocalcemia: Lowest Ca = 8.3 mg/dL in last 2 days, will monitor and replace as appropriate  # Hypercalcemia: Highest iCa = 5.3 mg/dL in last 2 days, will monitor as appropriate     # Coagulation Defect: INR = 1.30 (Ref range: 0.85 - 1.15) and/or PTT = 32 Seconds (Ref range: 22 - 38 Seconds), will monitor for bleeding        # Acute Hypercapnic Respiratory Failure: based on arterial blood gas results.  Continue supplemental oxygen and ventilatory support as indicated.         # Overweight: Estimated body mass index is 26.63 kg/m  as calculated from the following:    Height as of this encounter: 1.803 m (5' 11\").    Weight as of this encounter: 86.6 kg (190 lb 14.7 oz)., PRESENT ON ADMISSION     # Financial/Environmental Concerns:           - Dispo: Transfer to [facility]. Therapies recommending discharge to home when medically ready. Medically Ready for Discharge: Anticipated in 2-4 Days, dispo pending medical progress.        Interval History  Poor pain control overnight. Improved today. Fatigued but working with therapies. BP controlled.       Medications  Current Facility-Administered Medications   Medication " Dose Route Frequency Provider Last Rate Last Admin    acetaminophen (TYLENOL) tablet 975 mg  975 mg Oral Q8H Marilyn Miller PA-C   975 mg at 01/21/25 0536    aspirin (ASA) tablet 325 mg  325 mg Oral or NG Tube Daily Allison Gramajo PA-C        atorvastatin (LIPITOR) tablet 40 mg  40 mg Oral or Feeding Tube QPM Allison Gramajo PA-C   40 mg at 01/20/25 2048    ceFAZolin (ANCEF) 2 g in 100 mL D5W intermittent infusion  2 g Intravenous Q8H Allison Gramajo PA-C 200 mL/hr at 01/21/25 0129 2 g at 01/21/25 0129    folic acid (FOLVITE) tablet 1 mg  1 mg Oral Daily Westley Smith MD   1 mg at 01/20/25 1900    gabapentin (NEURONTIN) capsule 100 mg  100 mg Oral TID Marilyn Miller PA-C        heparin ANTICOAGULANT injection 5,000 Units  5,000 Units Subcutaneous Q8H Allison Gramajo PA-C        insulin aspart (NovoLOG) injection (RAPID ACTING)  1-7 Units Subcutaneous TID AC Marilyn Miller PA-C        insulin aspart (NovoLOG) injection (RAPID ACTING)  1-5 Units Subcutaneous At Bedtime Marilyn Miller PA-C        ketorolac (TORADOL) injection 15 mg  15 mg Intravenous Q6H Kristian Lomeli MD   15 mg at 01/21/25 0813    [START ON 1/22/2025] Lidocaine (LIDOCARE) 4 % Patch 1-2 patch  1-2 patch Transdermal Q24H Marilyn Miller PA-C        methocarbamol (ROBAXIN) tablet 1,000 mg  1,000 mg Oral 4x Daily Marilyn Miller PA-C        metoprolol tartrate (LOPRESSOR) half-tab 12.5 mg  12.5 mg Oral BID Marilyn Miller PA-C        multivitamin w/minerals (THERA-VIT-M) tablet 1 tablet  1 tablet Oral Daily Westley Smith MD   1 tablet at 01/20/25 1859    pantoprazole (PROTONIX) 2 mg/mL suspension 40 mg  40 mg Oral or NG Tube Daily Allison Gramajo PA-C        Or    pantoprazole (PROTONIX) EC tablet 40 mg  40 mg Oral Daily Allison Gramajo PA-C        polyethylene glycol (MIRALAX) Packet 17 g  17 g Oral Daily Allison Gramajo PA-C        senna-docusate (SENOKOT-S/PERICOLACE) 8.6-50 MG per tablet 1 tablet  1 tablet Oral BID  Allison Gramajo PA-C   1 tablet at 01/20/25 2048    sodium chloride (PF) 0.9% PF flush 3 mL  3 mL Intracatheter Q8H Allison Gramajo PA-C   3 mL at 01/21/25 0435    thiamine (B-1) tablet 100 mg  100 mg Oral Daily Westley Smith MD   100 mg at 01/20/25 1859    vancomycin (VANCOCIN) 1,500 mg in 0.9% NaCl 265 mL intermittent infusion  1,500 mg Intravenous Q12H Allison Gramajo PA-C   1,500 mg at 01/21/25 0740     Current Facility-Administered Medications   Medication Dose Route Frequency Provider Last Rate Last Admin    [START ON 1/23/2025] bisacodyl (DULCOLAX) suppository 10 mg  10 mg Rectal Daily PRN Allison Gramajo PA-C        dextrose 10% infusion   Intravenous Continuous PRN Allison Gramajo PA-C        glucose gel 15-30 g  15-30 g Oral Q15 Min PRN Allison Gramajo PA-C        Or    dextrose 50 % injection 25-50 mL  25-50 mL Intravenous Q15 Min PRN Allison Gramajo PA-C        Or    glucagon injection 1 mg  1 mg Subcutaneous Q15 Min PRN Allison Gramajo PA-C        hydrALAZINE (APRESOLINE) injection 10 mg  10 mg Intravenous Q30 Min PRN Marilyn Miller PA-C        HYDROmorphone (DILAUDID) injection 0.2 mg  0.2 mg Intravenous Q2H PRN Allison Gramajo PA-C   0.2 mg at 01/20/25 2128    Or    HYDROmorphone (DILAUDID) injection 0.4 mg  0.4 mg Intravenous Q2H PRN Allison Gramajo PA-C   0.4 mg at 01/21/25 0630    hydrOXYzine HCl (ATARAX) tablet 25 mg  25 mg Oral Q6H PRN Allison Gramajo PA-C   25 mg at 01/21/25 0316    lidocaine (LMX4) cream   Topical Q1H PRN Allison Gramajo PA-C        lidocaine 1 % 0.1-1 mL  0.1-1 mL Other Q1H PRN Allison Gramajo PA-C        [START ON 1/22/2025] magnesium hydroxide (MILK OF MAGNESIA) suspension 30 mL  30 mL Oral Daily PRN Allison Gramajo PA-C        naloxone (NARCAN) injection 0.2 mg  0.2 mg Intravenous Q2 Min PRN Marlena Anne MD        Or    naloxone (NARCAN) injection 0.4 mg  0.4 mg Intravenous Q2 Min PRN Marlena Anne MD         "Or    naloxone (NARCAN) injection 0.2 mg  0.2 mg Intramuscular Q2 Min PRN Marlena Anne MD        Or    naloxone (NARCAN) injection 0.4 mg  0.4 mg Intramuscular Q2 Min PRN Marlena Anne MD        ondansetron (ZOFRAN ODT) ODT tab 4 mg  4 mg Oral Q6H PRN Allison Gramajo PA-C        Or    ondansetron (ZOFRAN) injection 4 mg  4 mg Intravenous Q6H PRN Allison Gramajo PA-C   4 mg at 01/20/25 2318    oxyCODONE (ROXICODONE) tablet 5 mg  5 mg Oral Q4H PRAllison Kaur PA-C   5 mg at 01/20/25 1416    Or    oxyCODONE (ROXICODONE) tablet 10 mg  10 mg Oral Q4H PRN Allison Gramajo PA-C   10 mg at 01/21/25 0342    prochlorperazine (COMPAZINE) injection 10 mg  10 mg Intravenous Q6H PRN Allison Gramajo PA-C   10 mg at 01/21/25 0107    Or    prochlorperazine (COMPAZINE) tablet 10 mg  10 mg Oral Q6H PRN Allison Gramajo PA-C        Reason beta blocker order not selected   Does not apply DOES NOT GO TO Allison Moyer PA-C        sodium chloride (PF) 0.9% PF flush 3 mL  3 mL Intracatheter q1 min prAllison Kaur PA-C             Physical Exam  Vitals were reviewed  Blood pressure (!) 151/94, pulse 110, temperature 100  F (37.8  C), resp. rate 20, height 1.803 m (5' 11\"), weight 86.6 kg (190 lb 14.7 oz), SpO2 97%.  Rhythm: NSR    Lungs: diminished bases    Cardiovascular: rrr, no m/r/g    Abdomen: soft, NT, ND, +BS    Extremeties: warm, no LE edema    Incision: right chest and groin incision CDI    CT: serosang output 310 mL, no air leak    Weight:   Vitals:    01/20/25 0600 01/21/25 0600   Weight: 85.3 kg (188 lb) 86.6 kg (190 lb 14.7 oz)         Data  Recent Labs   Lab 01/21/25  0751 01/21/25  0412 01/21/25  0410 01/20/25  1449 01/20/25  1348 01/20/25  1347 01/20/25  1250 01/20/25  1230 01/20/25  1225   WBC  --  19.2*  --   --  16.8*  --   --   --  11.6*   HGB  --  13.8  --   --  13.1*  --  12.4*   < > 12.4*   MCV  --  86  --   --  87  --   --   --  88   PLT  --  241  --   --  196 "  --   --   --  155   INR  --   --   --   --  1.30*  --   --   --  1.46*   NA  --  138  --   --  143  --  142   < > 144   POTASSIUM  --  3.7  --   --  4.6  --  4.5   < > 4.6   CHLORIDE  --  105  --   --  112*  --   --   --  110*   CO2  --  24  --   --  23  --   --   --  26   BUN  --  9.4  --   --  9.3  --   --   --  9.7   CR  --  0.88  --   --  0.94  --   --   --  0.88   ANIONGAP  --  9  --   --  8  --   --   --  8   SOFI  --  8.6*  --   --  8.3*  --   --   --  9.3   * 151* 132*   < > 124*   < > 127*   < > 142*   ALBUMIN  --  4.0  --   --  3.5  --   --   --   --    PROTTOTAL  --  6.1*  --   --  5.1*  --   --   --   --    BILITOTAL  --  0.5  --   --  0.7  --   --   --   --    ALKPHOS  --  68  --   --  63  --   --   --   --    ALT  --  30  --   --  29  --   --   --   --    AST  --  33  --   --  23  --   --   --   --     < > = values in this interval not displayed.       Imaging:  Recent Results (from the past 24 hours)   MARIA TERESA with Report    Narrative    Natividad Chase MD     1/20/2025 11:05 AM  Perioperative MARIA TERESA Procedure Note    Staff -        Anesthesiologist:  Natividad Chase MD       Performed By: anesthesiologist  Preanesthesia Checklist:  Patient identified, IV assessed, risks and   benefits discussed, monitors and equipment assessed, procedure being   performed at surgeon's request and anesthesia consent obtained.    MARIA TERESA Probe Insertion    Probe Status PRE Insertion: NO obvious damage  Probe type:  Adult 3D  Bite block used:   Soft  Insertion Technique: Easy, no oropharyngeal manipulation  Insertion complications: None obvious  Billing Report:A MARIA TERESA report is NOT being generated.  Probe Status POST Removal: NO obvious damage         XR Chest Port 1 View    Narrative    EXAM: XR CHEST PORT 1 VIEW  LOCATION: [facility]  DATE: 1/20/2025    INDICATION: Postop CVTS surgery.  COMPARISON: None.      Impression    IMPRESSION:   Endotracheal tube above the suly, NG tube in the stomach,  [place] catheter tip in the main PA, right chest tubes. Calcified granuloma right lower lobe. Lungs are otherwise clear. No pneumothorax.   XR Chest Port 1 View    Narrative    EXAM: XR CHEST PORT 1 VIEW  LOCATION: [facility]  DATE: 1/21/2025    INDICATION: Post Op CVTS Surgery  COMPARISON: January 20, 2025      Impression    IMPRESSION: Pulmonary arterial catheter has its tip just to the right of midline in the proximal right pulmonary artery. 2 right thoracostomy drains remain. The lungs are clear. No pneumothorax or pleural effusion.         Patient seen and discussed with Dr. Anne.      MELA BrownC  Cardiothoracic Surgery  Available for paging [phone] (personal pager or CV Surgery Rounding Pager)  Personal Pager: [phone]  CV Surgery Rounding Pager: [phone]  After hours please page surgeon on-call

## 2025-01-22 ENCOUNTER — APPOINTMENT (OUTPATIENT)
Dept: PHYSICAL THERAPY | Facility: CLINIC | Age: 50
DRG: 228 | End: 2025-01-22
Attending: SURGERY
Payer: COMMERCIAL

## 2025-01-22 LAB
ANION GAP SERPL CALCULATED.3IONS-SCNC: 10 MMOL/L (ref 7–15)
BUN SERPL-MCNC: 9.5 MG/DL (ref 6–20)
CALCIUM SERPL-MCNC: 8.4 MG/DL (ref 8.8–10.4)
CHLORIDE SERPL-SCNC: 108 MMOL/L (ref 98–107)
CREAT SERPL-MCNC: 0.94 MG/DL (ref 0.67–1.17)
EGFRCR SERPLBLD CKD-EPI 2021: >90 ML/MIN/1.73M2
ERYTHROCYTE [DISTWIDTH] IN BLOOD BY AUTOMATED COUNT: 12.5 % (ref 10–15)
GLUCOSE BLDC GLUCOMTR-MCNC: 102 MG/DL (ref 70–99)
GLUCOSE BLDC GLUCOMTR-MCNC: 105 MG/DL (ref 70–99)
GLUCOSE BLDC GLUCOMTR-MCNC: 83 MG/DL (ref 70–99)
GLUCOSE BLDC GLUCOMTR-MCNC: 88 MG/DL (ref 70–99)
GLUCOSE SERPL-MCNC: 116 MG/DL (ref 70–99)
HCO3 SERPL-SCNC: 23 MMOL/L (ref 22–29)
HCT VFR BLD AUTO: 36.5 % (ref 40–53)
HGB BLD-MCNC: 12.8 G/DL (ref 13.3–17.7)
MCH RBC QN AUTO: 30.7 PG (ref 26.5–33)
MCHC RBC AUTO-ENTMCNC: 35.1 G/DL (ref 31.5–36.5)
MCV RBC AUTO: 88 FL (ref 78–100)
PATH REPORT.ADDENDUM SPEC: NORMAL
PATH REPORT.COMMENTS IMP SPEC: NORMAL
PATH REPORT.FINAL DX SPEC: NORMAL
PATH REPORT.GROSS SPEC: NORMAL
PATH REPORT.MICROSCOPIC SPEC OTHER STN: NORMAL
PATH REPORT.RELEVANT HX SPEC: NORMAL
PHOTO IMAGE: NORMAL
PLATELET # BLD AUTO: 189 10E3/UL (ref 150–450)
POTASSIUM SERPL-SCNC: 3.8 MMOL/L (ref 3.4–5.3)
RBC # BLD AUTO: 4.17 10E6/UL (ref 4.4–5.9)
SODIUM SERPL-SCNC: 141 MMOL/L (ref 135–145)
WBC # BLD AUTO: 14.3 10E3/UL (ref 4–11)

## 2025-01-22 PROCEDURE — 250N000013 HC RX MED GY IP 250 OP 250 PS 637: Performed by: PHYSICIAN ASSISTANT

## 2025-01-22 PROCEDURE — 250N000011 HC RX IP 250 OP 636: Performed by: PHYSICIAN ASSISTANT

## 2025-01-22 PROCEDURE — 97530 THERAPEUTIC ACTIVITIES: CPT | Mod: GP

## 2025-01-22 PROCEDURE — 85041 AUTOMATED RBC COUNT: CPT | Performed by: PHYSICIAN ASSISTANT

## 2025-01-22 PROCEDURE — 85014 HEMATOCRIT: CPT | Performed by: PHYSICIAN ASSISTANT

## 2025-01-22 PROCEDURE — 120N000013 HC R&B IMCU

## 2025-01-22 PROCEDURE — 80048 BASIC METABOLIC PNL TOTAL CA: CPT | Performed by: PHYSICIAN ASSISTANT

## 2025-01-22 PROCEDURE — 36415 COLL VENOUS BLD VENIPUNCTURE: CPT | Performed by: PHYSICIAN ASSISTANT

## 2025-01-22 PROCEDURE — 97110 THERAPEUTIC EXERCISES: CPT | Mod: GP

## 2025-01-22 RX ORDER — POLYETHYLENE GLYCOL 3350 17 G/17G
17 POWDER, FOR SOLUTION ORAL 2 TIMES DAILY
Status: DISCONTINUED | OUTPATIENT
Start: 2025-01-22 | End: 2025-01-23 | Stop reason: HOSPADM

## 2025-01-22 RX ORDER — AMOXICILLIN 250 MG
2 CAPSULE ORAL 2 TIMES DAILY
Status: DISCONTINUED | OUTPATIENT
Start: 2025-01-22 | End: 2025-01-23 | Stop reason: HOSPADM

## 2025-01-22 RX ORDER — METOPROLOL TARTRATE 25 MG/1
25 TABLET, FILM COATED ORAL 2 TIMES DAILY
Status: DISCONTINUED | OUTPATIENT
Start: 2025-01-22 | End: 2025-01-23 | Stop reason: HOSPADM

## 2025-01-22 RX ADMIN — HEPARIN SODIUM 5000 UNITS: 5000 INJECTION, SOLUTION INTRAVENOUS; SUBCUTANEOUS at 21:14

## 2025-01-22 RX ADMIN — LIDOCAINE 2 PATCH: 4 PATCH TOPICAL at 10:05

## 2025-01-22 RX ADMIN — METHOCARBAMOL 1000 MG: 500 TABLET ORAL at 21:15

## 2025-01-22 RX ADMIN — POLYETHYLENE GLYCOL 3350 17 G: 17 POWDER, FOR SOLUTION ORAL at 10:05

## 2025-01-22 RX ADMIN — KETOROLAC TROMETHAMINE 15 MG: 15 INJECTION, SOLUTION INTRAMUSCULAR; INTRAVENOUS at 13:11

## 2025-01-22 RX ADMIN — SENNOSIDES AND DOCUSATE SODIUM 2 TABLET: 50; 8.6 TABLET ORAL at 10:06

## 2025-01-22 RX ADMIN — GABAPENTIN 100 MG: 100 CAPSULE ORAL at 10:06

## 2025-01-22 RX ADMIN — METHOCARBAMOL 1000 MG: 500 TABLET ORAL at 17:01

## 2025-01-22 RX ADMIN — ACETAMINOPHEN 975 MG: 325 TABLET, FILM COATED ORAL at 21:15

## 2025-01-22 RX ADMIN — KETOROLAC TROMETHAMINE 15 MG: 15 INJECTION, SOLUTION INTRAMUSCULAR; INTRAVENOUS at 05:32

## 2025-01-22 RX ADMIN — METHOCARBAMOL 1000 MG: 500 TABLET ORAL at 13:11

## 2025-01-22 RX ADMIN — ACETAMINOPHEN 975 MG: 325 TABLET, FILM COATED ORAL at 13:11

## 2025-01-22 RX ADMIN — ASPIRIN 325 MG ORAL TABLET 325 MG: 325 PILL ORAL at 10:06

## 2025-01-22 RX ADMIN — ACETAMINOPHEN 975 MG: 325 TABLET, FILM COATED ORAL at 05:32

## 2025-01-22 RX ADMIN — METHOCARBAMOL 1000 MG: 500 TABLET ORAL at 10:06

## 2025-01-22 RX ADMIN — HEPARIN SODIUM 5000 UNITS: 5000 INJECTION, SOLUTION INTRAVENOUS; SUBCUTANEOUS at 13:12

## 2025-01-22 RX ADMIN — PANTOPRAZOLE SODIUM 40 MG: 40 TABLET, DELAYED RELEASE ORAL at 10:06

## 2025-01-22 RX ADMIN — ATORVASTATIN CALCIUM 40 MG: 40 TABLET, FILM COATED ORAL at 21:15

## 2025-01-22 RX ADMIN — GABAPENTIN 100 MG: 100 CAPSULE ORAL at 21:14

## 2025-01-22 RX ADMIN — HEPARIN SODIUM 5000 UNITS: 5000 INJECTION, SOLUTION INTRAVENOUS; SUBCUTANEOUS at 05:32

## 2025-01-22 RX ADMIN — METOPROLOL TARTRATE 12.5 MG: 25 TABLET, FILM COATED ORAL at 10:06

## 2025-01-22 RX ADMIN — METOPROLOL TARTRATE 25 MG: 25 TABLET, FILM COATED ORAL at 21:15

## 2025-01-22 ASSESSMENT — ACTIVITIES OF DAILY LIVING (ADL)
ADLS_ACUITY_SCORE: 43
ADLS_ACUITY_SCORE: 43
ADLS_ACUITY_SCORE: 38
ADLS_ACUITY_SCORE: 43
ADLS_ACUITY_SCORE: 38
ADLS_ACUITY_SCORE: 38
ADLS_ACUITY_SCORE: 43
ADLS_ACUITY_SCORE: 38
ADLS_ACUITY_SCORE: 43
ADLS_ACUITY_SCORE: 38
ADLS_ACUITY_SCORE: 43
ADLS_ACUITY_SCORE: 43
ADLS_ACUITY_SCORE: 40
ADLS_ACUITY_SCORE: 38
ADLS_ACUITY_SCORE: 38
ADLS_ACUITY_SCORE: 43
ADLS_ACUITY_SCORE: 38
ADLS_ACUITY_SCORE: 43

## 2025-01-22 NOTE — PLAN OF CARE
Goal Outcome Evaluation:      Plan of Care Reviewed With: patient    Overall Patient Progress: improvingOverall Patient Progress: improving         A/Ox4. VSS ex tachycardic (100s). On RA. Tele ST. LS diminished in bases. Shallow respirations. Encouraging use of IS. +bs, +flatus, -bm. Velazquez patent with adequate output. Mini thoracotomy site ecchymotic cdi/aramis with liquid bandage. Dressing to chest tube site cdi. Chest tubes patent, no air leak, no crepitus, serosanguinous output. Rating pain 4/10. Managing with scheduled medications and repositioning. Up with assist x1 and gb. Tolerating diet. Slept between cares.

## 2025-01-22 NOTE — PROGRESS NOTES
[facility]  Cardiovascular and Thoracic Surgery Daily Note      Assessment and Plan  POD # 2 s/p right mini-thoracotomy endoscopic-assisted removal of intracardiac mass, right common femoral arterial and venous cannulation for cardiopulmonary bypass, on 01/20/2025 with Dr. Marlena Anne.    - CVS: Pre-op TTE with preserved biventricular function. Postop vasoplegic shock, resolved. Tachycardic despite better pain control, Lopressor 12.5 mg BID with hold parameters- will increase to 25mg BID today. Euvolemic. 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 Aspirin 81 mg daily, atorvastatin 40 mg daily. Chest tubes: output 340 ml- decreasing significantly over past couple shifts, serosang, no air leak. TPW: capped. Plan to remove chest tubes and pacing wires today.     - Resp: Postoperative mechanical ventilation, resolved. Extubated POD0. IS, pulmonary toilet. Recently quit smoking, encourage cessation.       - Neuro: Neuro intact, pain control suboptimal on current regimen.Scheduled Toradol, Tylenol, Robaxin, gabapentin, Lidoderm patches added. PRN oxycodone and Dilaudid. Ice PRN. History of acute ischemic CVA with multifocal L MCA infarct 07/2023, subacute right PCA infarct 04/2024, large acute to subacute right paramedian cerebellar infarct 12/2024; etiology of strokes presumed to be embolic from intracardiac mass. No residual deficits noted. History of heavy alcohol use, recently quit, CIWA in place.    - Renal: No history of significant renal disease. Cr stable WNL. Trend BMP. Diuretic as above.  Recent Labs   Lab 01/22/25  0627 01/21/25  0412 01/20/25  1348   CR 0.94 0.88 0.94       - GI: -BM, +flatus, continue bowel regimen    - : remove zhang today    - Endo: Postop stress hyperglycemia, improved. Insulin infusion transitioned to sliding scale insulin.   Hemoglobin A1C   Date Value Ref Range Status   01/20/2025 5.7 (H) <5.7 %  "Final     Comment:     Normal <5.7%   Prediabetes 5.7-6.4%    Diabetes 6.5% or higher     Note: Adopted from ADA consensus guidelines.        - FEN: Replace electrolytes as needed. Regular diet.     - ID: Postop leukocytosis. Tmax 100.6. WBC 19.2. Periop abx prophylaxis complete. Trend CBC and fever curve.   Recent Labs   Lab 01/22/25  0627 01/21/25  0412 01/20/25  1348   WBC 14.3* 19.2* 16.8*       - Heme: Acute blood loss anemia due to surgery. Hgb and PLT stable. Trend CBC, transfuse PRN.   Recent Labs   Lab 01/22/25  0627 01/21/25  0412 01/20/25  1348   HGB 12.8* 13.8 13.1*    241 196       - Proph: SCD, subcutaneous heparin, PPI    - Other:  Clinically Significant Risk Factors          # Hyperchloremia: Highest Cl = 112 mmol/L in last 2 days, will monitor as appropriate      # Hypocalcemia: Lowest Ca = 8.3 mg/dL in last 2 days, will monitor and replace as appropriate  # Hypercalcemia: Highest iCa = 5.3 mg/dL in last 2 days, will monitor as appropriate       # Coagulation Defect: INR = 1.30 (Ref range: 0.85 - 1.15) and/or PTT = 32 Seconds (Ref range: 22 - 38 Seconds), will monitor for bleeding        # Acute Hypercapnic Respiratory Failure: based on arterial blood gas results.  Continue supplemental oxygen and ventilatory support as indicated.         # Overweight: Estimated body mass index is 25.57 kg/m  as calculated from the following:    Height as of this encounter: 1.803 m (5' 11\").    Weight as of this encounter: 83.1 kg (183 lb 4.8 oz)., PRESENT ON ADMISSION       # Financial/Environmental Concerns: none         - Dispo: [address]. Therapies recommending discharge to home when medically ready. Medically Ready for Discharge: 1-2 days, dispo pending removal of chest tubes, return of bowel and bladder function      Interval History  No acute events overnight. Saturating well on room air. Pain better controlled today. Tolerating diet. +flatus, no BM yet.       Medications  Current Facility-Administered " Medications   Medication Dose Route Frequency Provider Last Rate Last Admin    acetaminophen (TYLENOL) tablet 975 mg  975 mg Oral Q8H MillerMarilyn PA-C   975 mg at 01/22/25 0532    aspirin (ASA) tablet 325 mg  325 mg Oral or NG Tube Daily Miller, MELA KnottC   325 mg at 01/22/25 1006    atorvastatin (LIPITOR) tablet 40 mg  40 mg Oral or Feeding Tube QPM MillerMarilyn PA-C   40 mg at 01/21/25 2108    gabapentin (NEURONTIN) capsule 100 mg  100 mg Oral TID Miller, MELA KnottC   100 mg at 01/22/25 1006    heparin ANTICOAGULANT injection 5,000 Units  5,000 Units Subcutaneous Q8H MillerMarilyn PA-C   5,000 Units at 01/22/25 0532    insulin aspart (NovoLOG) injection (RAPID ACTING)  1-7 Units Subcutaneous TID AC Marilyn Miller PA-C        insulin aspart (NovoLOG) injection (RAPID ACTING)  1-5 Units Subcutaneous At Bedtime Marilyn Miller PA-C        ketorolac (TORADOL) injection 15 mg  15 mg Intravenous Q6H MillerMarilyn PA-C   15 mg at 01/22/25 0532    Lidocaine (LIDOCARE) 4 % Patch 1-2 patch  1-2 patch Transdermal Q24H MillerMarilyn PA-C   2 patch at 01/22/25 1005    methocarbamol (ROBAXIN) tablet 1,000 mg  1,000 mg Oral 4x Daily MillerMarilyn PA-C   1,000 mg at 01/22/25 1006    metoprolol tartrate (LOPRESSOR) half-tab 12.5 mg  12.5 mg Oral BID MillerMarilyn PA-C   12.5 mg at 01/22/25 1006    pantoprazole (PROTONIX) 2 mg/mL suspension 40 mg  40 mg Oral or NG Tube Daily MillerMarilyn rose PA-C        Or    pantoprazole (PROTONIX) EC tablet 40 mg  40 mg Oral Daily MillerMarilyn PA-C   40 mg at 01/22/25 1006    polyethylene glycol (MIRALAX) Packet 17 g  17 g Oral BID Angella Haney PA-C   17 g at 01/22/25 1005    senna-docusate (SENOKOT-S/PERICOLACE) 8.6-50 MG per tablet 2 tablet  2 tablet Oral BID Angella Haney PA-C   2 tablet at 01/22/25 1006    sodium chloride (PF) 0.9% PF flush 3 mL  3 mL Intracatheter Q8H Marilyn Miller PA-C   3 mL at 01/22/25 0537     Current  Facility-Administered Medications   Medication Dose Route Frequency Provider Last Rate Last Admin    [START ON 1/23/2025] bisacodyl (DULCOLAX) suppository 10 mg  10 mg Rectal Daily PRN Miller, Marilyn COE PA-C        dextrose 10% infusion   Intravenous Continuous PRN Miller, Marilyn COE, PA-C        glucose gel 15-30 g  15-30 g Oral Q15 Min PRN Miller, Marilyn COE PA-C        Or    dextrose 50 % injection 25-50 mL  25-50 mL Intravenous Q15 Min PRN Miller, Marilyn COE PA-C        Or    glucagon injection 1 mg  1 mg Subcutaneous Q15 Min PRN Miller, Marilyn COE, PA-C        hydrALAZINE (APRESOLINE) injection 10 mg  10 mg Intravenous Q30 Min PRN Milelr, Marilyn COE PA-C        HYDROmorphone (DILAUDID) injection 0.2 mg  0.2 mg Intravenous Q2H PRN Miller, Marilyn COE PA-C   0.2 mg at 01/20/25 2128    Or    HYDROmorphone (DILAUDID) injection 0.4 mg  0.4 mg Intravenous Q2H PRN Miller, Marilyn COE PA-C   0.4 mg at 01/21/25 0916    hydrOXYzine HCl (ATARAX) tablet 25 mg  25 mg Oral Q6H PRN Miller, Marilyn COE PA-C   25 mg at 01/21/25 0316    lidocaine (LMX4) cream   Topical Q1H PRN Miller, Marilyn COE PA-C        lidocaine 1 % 0.1-1 mL  0.1-1 mL Other Q1H PRN Miller, Marilyn COE PA-C        magnesium hydroxide (MILK OF MAGNESIA) suspension 30 mL  30 mL Oral Daily PRN Miller, Marilyn COE PA-C        naloxone (NARCAN) injection 0.2 mg  0.2 mg Intravenous Q2 Min PRN Miller, Marilyn COE PA-C        Or    naloxone (NARCAN) injection 0.4 mg  0.4 mg Intravenous Q2 Min PRN Miller, Marilyn COE PA-C        Or    naloxone (NARCAN) injection 0.2 mg  0.2 mg Intramuscular Q2 Min PRN Miller, Marilyn COE PA-C        Or    naloxone (NARCAN) injection 0.4 mg  0.4 mg Intramuscular Q2 Min PRN Miller, Marilyn COE, PA-C        ondansetron (ZOFRAN ODT) ODT tab 4 mg  4 mg Oral Q6H PRN Marilyn Miller PA-C        Or    ondansetron (ZOFRAN) injection 4 mg  4 mg Intravenous Q6H PRN Marilyn Miller PA-C   4 mg at 01/20/25 2318    oxyCODONE (ROXICODONE) tablet 5 mg  5 mg Oral Q4H PRN Marilyn Miller,  "PA-CAROLIN   5 mg at 01/21/25 1234    Or    oxyCODONE (ROXICODONE) tablet 10 mg  10 mg Oral Q4H PRN Marilyn Miller PA-C   10 mg at 01/21/25 0844    prochlorperazine (COMPAZINE) injection 10 mg  10 mg Intravenous Q6H PRN Marilyn Miller PA-C   10 mg at 01/21/25 0107    Or    prochlorperazine (COMPAZINE) tablet 10 mg  10 mg Oral Q6H PRN Marilyn Miller PA-C        Reason beta blocker order not selected   Does not apply DOES NOT GO TO MAR Marilyn Miller PA-C        sodium chloride (PF) 0.9% PF flush 3 mL  3 mL Intracatheter q1 min prn Marilyn Miller PA-C             Physical Exam  Vitals were reviewed  Blood pressure 121/87, pulse (!) 113, temperature 99  F (37.2  C), temperature source Oral, resp. rate (!) 16, height 1.803 m (5' 11\"), weight 83.1 kg (183 lb 4.8 oz), SpO2 96%.  Rhythm: NSR    Lungs: diminished bases    Cardiovascular: rrr, no m/r/g    Abdomen: soft, NT, ND, +BS    Extremeties: warm, no LE edema    Incision: right chest and groin incision CDI    CT: serosang output 340 mL, no air leak    Weight:   Vitals:    01/20/25 0600 01/21/25 0600 01/21/25 1449 01/22/25 0531   Weight: 85.3 kg (188 lb) 86.6 kg (190 lb 14.7 oz) 83.9 kg (185 lb) 83.1 kg (183 lb 4.8 oz)         Data  Recent Labs   Lab 01/22/25  0735 01/22/25  0627 01/21/25  2108 01/21/25  0751 01/21/25  0412 01/20/25  1449 01/20/25  1348 01/20/25  1230 01/20/25  1225   WBC  --  14.3*  --   --  19.2*  --  16.8*  --  11.6*   HGB  --  12.8*  --   --  13.8  --  13.1*   < > 12.4*   MCV  --  88  --   --  86  --  87  --  88   PLT  --  189  --   --  241  --  196  --  155   INR  --   --   --   --   --   --  1.30*  --  1.46*   NA  --  141  --   --  138  --  143   < > 144   POTASSIUM  --  3.8  --   --  3.7  --  4.6   < > 4.6   CHLORIDE  --  108*  --   --  105  --  112*  --  110*   CO2  --  23  --   --  24  --  23  --  26   BUN  --  9.5  --   --  9.4  --  9.3  --  9.7   CR  --  0.94  --   --  0.88  --  0.94  --  0.88   ANIONGAP  --  10  --   --  9  --  8  --  8 "   SOFI  --  8.4*  --   --  8.6*  --  8.3*  --  9.3   * 116* 107*   < > 151*   < > 124*   < > 142*   ALBUMIN  --   --   --   --  4.0  --  3.5  --   --    PROTTOTAL  --   --   --   --  6.1*  --  5.1*  --   --    BILITOTAL  --   --   --   --  0.5  --  0.7  --   --    ALKPHOS  --   --   --   --  68  --  63  --   --    ALT  --   --   --   --  30  --  29  --   --    AST  --   --   --   --  33  --  23  --   --     < > = values in this interval not displayed.       Imaging:  No results found for this or any previous visit (from the past 24 hours).        Patient seen and discussed with Dr. Anne.      Allison Gramajo PA-C  Cardiothoracic Surgery  Available for paging [phone] (personal pager or CV Surgery Rounding Pager)  Personal Pager: [phone]  CV Surgery Rounding Pager: [phone]  After hours please page surgeon on-call

## 2025-01-22 NOTE — PLAN OF CARE
"Patient Name: Carole  MRN: [id number]  Date of Admission: 1/20/2025  Reason for Admission: S/p aortic valve repair  Level of Care: [facility]    Vitals:   BP Readings from Last 1 Encounters:   01/21/25 108/65     Pulse Readings from Last 1 Encounters:   01/21/25 105     Wt Readings from Last 1 Encounters:   01/21/25 83.9 kg (185 lb)     Ht Readings from Last 1 Encounters:   01/21/25 1.803 m (5' 11\")     Temp Readings from Last 1 Encounters:   01/21/25 99  F (37.2  C) (Oral)       Pain: Pain goal 0/10 Pain Rating 1/10   Effective pain medication/regimen : scheduled robaxin and tylenol    CV Surgery Patient: Yes Post Op Day #: 1    Assessment    General: Afebrile yes  Rhythm: normal sinus rhythm to sinus tachy ()  Blood Pressure Medications None (were not due)  Resp: Oxygen Status: RA  Incentive Spirometry Q 1-2 hour when awake: yes Volume: 1250  Neuro: Alert yes Orientation: self, person, time, and place  GI/:          Bowel Activity: no          Bowel Medications: not applicable          Urinary Catheter: yes  Skin:          Incision: Incision status: healing well          Epicardial Pacing Wires: yes  Chest Tubes   Pleural: yes Draining: yes               Suction: yes -20 cmH2O              Mediastinal: no   Dressing Change Daily: no If no, why? Not due      Lines/Drains: L PIV SL  Activity: Ax1 GB  Abnormal Labs: WBC 19.2    Aggression Stop Light: Green          Patient Care Plan: Arrived to station [address] at 1500. Tolerating regular diet. Denies nausea or shortness of breath.           Goal Outcome Evaluation: progressing         "

## 2025-01-22 NOTE — PROGRESS NOTES
"NUTRITION ASSESSMENT    REASON FOR ASSESSMENT:  Cardiac Surgery Nutrition Consult    CURRENT DIET / INTAKE:  - Regular diet, 1 x 25% intake of 1 meal yesterday  - Appetite: \"not the greatest\"; reportedly usually only eats 1 meal/day  - Family brought in [employer] for lunch (soup + sandwich)  - Patient agreeable to Ensure Enlive BID    ANTHROPOMETRICS:   Ht: 5' 11\" (180.3 cm)  Wt: 83.1 kg (183 lb)  BMI: 25.57  IBW: 78.1 kg (172 lb)  Weight Status: Overweight BMI 25-29.9  %IBW: 106%    Wt Readings from Last 10 Encounters:   01/22/25 83.1 kg (183 lb 4.8 oz)   12/16/24 86.1 kg (189 lb 14.4 oz)   12/09/24 81.6 kg (180 lb)   07/22/23 81.6 kg (180 lb)     MALNUTRITION:  Patient does not meet two of the following criteria necessary for diagnosing malnutrition:  significant weight loss, reduced intake, subcutaneous fat loss, muscle loss or fluid retention. Nutrition Focused Physical Assessment (NFPA) not appropriate at this time.    NUTRITION DIAGNOSIS:   Inadequate oral intake related to cardiac surgery as evidenced by 25% intake documented yesterday, need for nutritional supplement to help meet needs    INTERVENTIONS:    Nutrition Prescription:  Recommend patient to eat smaller, more frequent meals given low appetite + nutritional supplements BID to help with protein intake    Implementation:  Nutrition Education (Content):  Discussed the importance of adequate nutrition post-op for healing and energy, emphasizing protein foods, and encouraged patient to order a protein food at each meal. Provided High Protein Food List.     Goals:  Patient to consume ~75% at meals in the next 3 - 5 days    Follow Up/Monitoring (InPatient):  Food and Fluid intake -  Monitor for adequacy    Follow Up/Monitoring (OutPatient):  Patient will participate in out-patient cardiac rehab and attend nutrition classes during the program    Fabiana Chappell RD, DONNA    "

## 2025-01-22 NOTE — PLAN OF CARE
"Patient Name: Carole  MRN: [id number]  Date of Admission: 1/20/2025  Reason for Admission: S/p aortic valve repair  Level of Care: [facility] [place]     Vitals:    /80 (BP Location: Right arm)   Pulse 97   Temp 98.3  F (36.8  C) (Oral)   Resp 16   Ht 1.803 m (5' 11\")   Wt 83.1 kg (183 lb 4.8 oz)   SpO2 97%   BMI 25.57 kg/m         Pain: Pain goal 0/10 Pain Rating 1/10 back pain  Effective pain medication/regimen : scheduled robaxin, tylenol, and toradol     CV Surgery Patient: Yes Post Op Day #: 2     Assessment     General: Afebrile yes  Rhythm: normal sinus rhythm to sinus tach  Blood Pressure Medications : metoprolol  Resp: Oxygen Status: RA  Incentive Spirometry Q 1-2 hour when awake: yes Volume: 1250  Neuro: Alert yes Orientation: self, person, time, and place  GI/:          Bowel Activity: yes          Bowel Medications: Yes          Urinary Catheter: no- removed at 1025, voiding well post removal  Skin:          Incision: Incision status: healing well          Epicardial Pacing Wires: no- removed  Chest Tubes              Pleural: no- removed at 1400              Mediastinal: n/a              Dressing Change Daily: done        Lines/Drains: L PIV SL  Activity: SBA  Abnormal Labs: BG achs  Activity level: SBA     Aggression Stop Light: Green          Patient Care Plan: Chest tubes and zhang removed. Voiding well. Pain managed well.  Encourage activity and IS.             Goal Outcome Evaluation: progressing    "

## 2025-01-23 ENCOUNTER — APPOINTMENT (OUTPATIENT)
Dept: GENERAL RADIOLOGY | Facility: CLINIC | Age: 50
DRG: 228 | End: 2025-01-23
Attending: PHYSICIAN ASSISTANT
Payer: COMMERCIAL

## 2025-01-23 ENCOUNTER — APPOINTMENT (OUTPATIENT)
Dept: PHYSICAL THERAPY | Facility: CLINIC | Age: 50
DRG: 228 | End: 2025-01-23
Attending: SURGERY
Payer: COMMERCIAL

## 2025-01-23 VITALS
TEMPERATURE: 98.1 F | WEIGHT: 182.3 LBS | HEIGHT: 71 IN | HEART RATE: 50 BPM | RESPIRATION RATE: 18 BRPM | SYSTOLIC BLOOD PRESSURE: 115 MMHG | DIASTOLIC BLOOD PRESSURE: 60 MMHG | OXYGEN SATURATION: 96 % | BODY MASS INDEX: 25.52 KG/M2

## 2025-01-23 PROBLEM — Z98.890 S/P THORACOTOMY: Status: ACTIVE | Noted: 2025-01-23

## 2025-01-23 LAB
ANION GAP SERPL CALCULATED.3IONS-SCNC: 8 MMOL/L (ref 7–15)
BUN SERPL-MCNC: 9.6 MG/DL (ref 6–20)
CALCIUM SERPL-MCNC: 8.9 MG/DL (ref 8.8–10.4)
CHLORIDE SERPL-SCNC: 110 MMOL/L (ref 98–107)
CREAT SERPL-MCNC: 1.01 MG/DL (ref 0.67–1.17)
EGFRCR SERPLBLD CKD-EPI 2021: >90 ML/MIN/1.73M2
ERYTHROCYTE [DISTWIDTH] IN BLOOD BY AUTOMATED COUNT: 12.4 % (ref 10–15)
GLUCOSE BLDC GLUCOMTR-MCNC: 116 MG/DL (ref 70–99)
GLUCOSE SERPL-MCNC: 110 MG/DL (ref 70–99)
HCO3 SERPL-SCNC: 26 MMOL/L (ref 22–29)
HCT VFR BLD AUTO: 39 % (ref 40–53)
HGB BLD-MCNC: 13.5 G/DL (ref 13.3–17.7)
MCH RBC QN AUTO: 30.3 PG (ref 26.5–33)
MCHC RBC AUTO-ENTMCNC: 34.6 G/DL (ref 31.5–36.5)
MCV RBC AUTO: 87 FL (ref 78–100)
PLATELET # BLD AUTO: 242 10E3/UL (ref 150–450)
POTASSIUM SERPL-SCNC: 3.8 MMOL/L (ref 3.4–5.3)
RBC # BLD AUTO: 4.46 10E6/UL (ref 4.4–5.9)
SODIUM SERPL-SCNC: 144 MMOL/L (ref 135–145)
WBC # BLD AUTO: 11.5 10E3/UL (ref 4–11)

## 2025-01-23 PROCEDURE — 250N000013 HC RX MED GY IP 250 OP 250 PS 637: Performed by: PHYSICIAN ASSISTANT

## 2025-01-23 PROCEDURE — 97530 THERAPEUTIC ACTIVITIES: CPT | Mod: GP

## 2025-01-23 PROCEDURE — 84295 ASSAY OF SERUM SODIUM: CPT | Performed by: PHYSICIAN ASSISTANT

## 2025-01-23 PROCEDURE — 71046 X-RAY EXAM CHEST 2 VIEWS: CPT

## 2025-01-23 PROCEDURE — 82435 ASSAY OF BLOOD CHLORIDE: CPT | Performed by: PHYSICIAN ASSISTANT

## 2025-01-23 PROCEDURE — 250N000011 HC RX IP 250 OP 636: Performed by: PHYSICIAN ASSISTANT

## 2025-01-23 PROCEDURE — 97110 THERAPEUTIC EXERCISES: CPT | Mod: GP

## 2025-01-23 PROCEDURE — 85027 COMPLETE CBC AUTOMATED: CPT | Performed by: PHYSICIAN ASSISTANT

## 2025-01-23 PROCEDURE — 36415 COLL VENOUS BLD VENIPUNCTURE: CPT | Performed by: PHYSICIAN ASSISTANT

## 2025-01-23 RX ORDER — METOPROLOL TARTRATE 25 MG/1
25 TABLET, FILM COATED ORAL 2 TIMES DAILY
Qty: 60 TABLET | Refills: 3 | Status: SHIPPED | OUTPATIENT
Start: 2025-01-23

## 2025-01-23 RX ORDER — AMOXICILLIN 250 MG
2 CAPSULE ORAL 2 TIMES DAILY PRN
Qty: 30 TABLET | Refills: 0 | Status: SHIPPED | OUTPATIENT
Start: 2025-01-23 | End: 2025-02-10

## 2025-01-23 RX ORDER — METHOCARBAMOL 1000 MG/1
1000 TABLET, FILM COATED ORAL 4 TIMES DAILY PRN
Qty: 60 TABLET | Refills: 0 | Status: SHIPPED | OUTPATIENT
Start: 2025-01-23 | End: 2025-02-10

## 2025-01-23 RX ORDER — ACETAMINOPHEN 325 MG/1
650 TABLET ORAL EVERY 6 HOURS PRN
Qty: 60 TABLET | Refills: 0 | Status: SHIPPED | OUTPATIENT
Start: 2025-01-23

## 2025-01-23 RX ADMIN — ACETAMINOPHEN 975 MG: 325 TABLET, FILM COATED ORAL at 05:32

## 2025-01-23 RX ADMIN — METHOCARBAMOL 1000 MG: 500 TABLET ORAL at 13:47

## 2025-01-23 RX ADMIN — LIDOCAINE 1 PATCH: 4 PATCH TOPICAL at 09:33

## 2025-01-23 RX ADMIN — HEPARIN SODIUM 5000 UNITS: 5000 INJECTION, SOLUTION INTRAVENOUS; SUBCUTANEOUS at 05:32

## 2025-01-23 RX ADMIN — METOPROLOL TARTRATE 25 MG: 25 TABLET, FILM COATED ORAL at 09:32

## 2025-01-23 RX ADMIN — PANTOPRAZOLE SODIUM 40 MG: 40 TABLET, DELAYED RELEASE ORAL at 09:32

## 2025-01-23 RX ADMIN — SENNOSIDES AND DOCUSATE SODIUM 2 TABLET: 50; 8.6 TABLET ORAL at 09:32

## 2025-01-23 RX ADMIN — ASPIRIN 325 MG ORAL TABLET 325 MG: 325 PILL ORAL at 09:32

## 2025-01-23 RX ADMIN — ACETAMINOPHEN 975 MG: 325 TABLET, FILM COATED ORAL at 13:47

## 2025-01-23 RX ADMIN — METHOCARBAMOL 1000 MG: 500 TABLET ORAL at 09:32

## 2025-01-23 RX ADMIN — GABAPENTIN 100 MG: 100 CAPSULE ORAL at 09:32

## 2025-01-23 ASSESSMENT — ACTIVITIES OF DAILY LIVING (ADL)
ADLS_ACUITY_SCORE: 37

## 2025-01-23 NOTE — PLAN OF CARE
Cardiac Rehab Discharge Summary    Reason for therapy discharge:    Discharged to home with outpatient cardiac rehab.    Progress towards therapy goal(s). See goals on Care Plan in Epic electronic health record for goal details.  Goals partially met.  Barriers to achieving goals:   discharge from facility.    Therapy recommendation(s):    Continued therapy is recommended.  Rationale/Recommendations:  recommend continued skilled therapies with OP cardiac rehab for improvement of strength and endurance, activity tolerance, and overall heart health.

## 2025-01-23 NOTE — DISCHARGE INSTRUCTIONS
AFTER YOU GO HOME FROM YOUR HEART SURGERY  ( right mini-thoracotomy endoscopic-assisted removal of intracardiac mass, right common femoral arterial and venous cannulation for cardiopulmonary bypass, on 01/20/2025 with Dr. Marlena Anne. )      You had a mini thoracotomy. No lifting over 10 pounds for 4 weeks.     No driving for 4 weeks after surgery or while on pain medication.     Shower or wash your incisions daily with antibacterial soap and water (or as instructed), pat dry. Keep wound clean and dry. No baths or swimming for 6 weeks and/or until incisions are completely healed over. Cover chest tube sites with gauze or a Bandaid until they stop draining, then leave open to air. It is normal for chest tube sites to drain fluid for up to 2-3 weeks after surgery. It is not normal to have drainage from other incisions.     Watch for signs of infection: increased redness, tenderness, warmth or any drainage from thoracotomy incision.  Also a temperature > 100.5 F or chills. Call your surgeon or primary care provider's office immediately.     Exercise is very important in your recovery. Please follow the guidelines set up for you in your cardiac rehab classes at the hospital. If outpatient cardiac rehab was ordered for you, we highly recommend you participate. If you have problems arranging your cardiac rehab, please call [phone] for all locations, with the exception of [place], please call [phone] and [place], please call [phone].    Avoid sitting for prolonged periods of time, try to walk every hour during the day. If you have a leg incision, elevate your leg often when you are not walking.  Your goal is to work up to 30 minutes of physical activity per day.     Check your weight when you get home from the hospital and continue to check it daily through your recovery for at least a month. If you notice a weight gain of 2-3 pounds overnight, or 5 pounds in a week, notify your primary care  physician, cardiologist, or surgeon. Check your blood pressure daily about one hour after you have taken your morning medications. Call you primary care doctor, cardiologist, or cardiac surgeon if your systolic blood pressure (top number) is consistently over 140. Write down your daily weight and blood pressure and bring this information to your follow up appointment.     Bowel activity may be slow after surgery. If necessary, you may take an over the counter laxative such as Milk of Magnesia or Miralax. You may have stool softeners prescribed (docusate sodium, Senokot). We recommend using stool softeners while using narcotics for pain (oxycodone/percocet, hydrocodone/vicodin, hydromorphone/dilaudid).      Wean OFF of narcotics (oxycodone, dilaudid, hydrocodone) as soon as possible. You should continue taking acetaminophen as long as you have any surgical pain as the first choice for pain control and add narcotics as necessary for pain to be tolerable.        REGARDING PRESCRIPTION REFILLS.  If you need a refill on your pain medication contact us to discuss your pain and a possible one time refill.   All other medications will be adjusted, discontinued and re-filled by your primary care physician and/or your cardiologist as they were prior to your surgery. We have given you enough for one to three month with possibly one refill. You may have refills available to you for some of your medications through the [facility]. If you would like your refills sent to a different a different pharmacy, please contact your preferred pharmacy and let them know that you have prescriptions at [place] that you would like transferred.    POST-OPERATIVE CLINIC VISITS  CV Surgery Advance Care Practitioners on 2/10/25 at 1:45pm at the Heart Clinic at [facility] in [place].    Cardiology, Dr. Martell, on 3/11/25 at the Heart Center at [facility]  Cardiac rehab on 1/30/25 at 8am please call  [phone] if you need to reschedule  If you need to cancel or reschedule your cardiology appointments please call [phone].     SURGICAL QUESTIONS  Please call our nurse coordinators, Kiran, at [phone] with questions or concerns related to surgery. For other non-urgent questions and requests, our nurse coordinators can also be reached via email or fax.   Email: [email or web address] or [email or web address]   Fax: [phone]    On weekends or after hours, please call [phone] and ask the  to page the Cardiothoracic Surgeon on-call.      Thank you,    Your Cardiothoracic Surgery Team

## 2025-01-23 NOTE — PROGRESS NOTES
[facility]  Cardiovascular and Thoracic Surgery Daily Note      Assessment and Plan  POD # 2 s/p right mini-thoracotomy endoscopic-assisted removal of intracardiac mass, right common femoral arterial and venous cannulation for cardiopulmonary bypass, on 01/20/2025 with Dr. Marlena Anne.    - CVS: Pre-op TTE with preserved biventricular function. Postop vasoplegic shock, resolved. Tachycardic despite better pain control, Lopressor 12.5 mg BID with hold parameters- will increase to 25mg BID today. Euvolemic. 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 Aspirin 81 mg daily, atorvastatin 40 mg daily. Chest tubes: output 340 ml- decreasing significantly over past couple shifts, serosang, no air leak. TPW: capped. Plan to remove chest tubes and pacing wires today.     - Resp: Postoperative mechanical ventilation, resolved. Extubated POD0. IS, pulmonary toilet. Recently quit smoking, encourage cessation.       - Neuro: Neuro intact, pain control suboptimal on current regimen.Scheduled Toradol, Tylenol, Robaxin, gabapentin, Lidoderm patches added. PRN oxycodone and Dilaudid. Ice PRN. History of acute ischemic CVA with multifocal L MCA infarct 07/2023, subacute right PCA infarct 04/2024, large acute to subacute right paramedian cerebellar infarct 12/2024; etiology of strokes presumed to be embolic from intracardiac mass. No residual deficits noted. History of heavy alcohol use, recently quit, CIWA in place.    - Renal: No history of significant renal disease. Cr stable WNL. Trend BMP. Diuretic as above.  Recent Labs   Lab 01/23/25  0528 01/22/25  0627 01/21/25  0412   CR 1.01 0.94 0.88       - GI: -BM, +flatus, continue bowel regimen    - : remove zhang today    - Endo: Postop stress hyperglycemia, improved. Insulin infusion transitioned to sliding scale insulin.   Hemoglobin A1C   Date Value Ref Range Status   01/20/2025 5.7 (H) <5.7 %  "Final     Comment:     Normal <5.7%   Prediabetes 5.7-6.4%    Diabetes 6.5% or higher     Note: Adopted from ADA consensus guidelines.        - FEN: Replace electrolytes as needed. Regular diet.     - ID: Postop leukocytosis. Tmax 100.6. WBC 19.2. Periop abx prophylaxis complete. Trend CBC and fever curve.   Recent Labs   Lab 01/23/25  0528 01/22/25  0627 01/21/25  0412   WBC 11.5* 14.3* 19.2*       - Heme: Acute blood loss anemia due to surgery. Hgb and PLT stable. Trend CBC, transfuse PRN.   Recent Labs   Lab 01/23/25  0528 01/22/25  0627 01/21/25  0412   HGB 13.5 12.8* 13.8    189 241       - Proph: SCD, subcutaneous heparin, PPI    - Other:  Clinically Significant Risk Factors   { TIP  Diagnoses will continue to appear throughout the admission.  :[id number]}       # Hyperchloremia: Highest Cl = 110 mmol/L in last 2 days, will monitor as appropriate                    # Acute Hypercapnic Respiratory Failure: based on arterial blood gas results.  Continue supplemental oxygen and ventilatory support as indicated.         # Overweight: Estimated body mass index is 25.43 kg/m  as calculated from the following:    Height as of this encounter: 1.803 m (5' 11\").    Weight as of this encounter: 82.7 kg (182 lb 4.8 oz)., PRESENT ON ADMISSION       # Financial/Environmental Concerns: none         - Dispo: [address] Therapies recommending discharge to home when medically ready. Medically Ready for Discharge: 1-2 days, dispo pending removal of chest tubes, return of bowel and bladder function      Interval History  No acute events overnight. Saturating well on room air. Pain better controlled today. Tolerating diet. +flatus, no BM yet.       Medications  Current Facility-Administered Medications   Medication Dose Route Frequency Provider Last Rate Last Admin    acetaminophen (TYLENOL) tablet 975 mg  975 mg Oral Q8H Marilyn Miller PA-C   975 mg at 01/23/25 0532    aspirin (ASA) tablet 325 mg  325 mg Oral or NG Tube Daily " Marilyn Miller PA-C   325 mg at 01/23/25 0932    atorvastatin (LIPITOR) tablet 40 mg  40 mg Oral or Feeding Tube QPM Marilyn Miller PA-C   40 mg at 01/22/25 2115    gabapentin (NEURONTIN) capsule 100 mg  100 mg Oral TID Marilyn Miller PA-C   100 mg at 01/23/25 0932    heparin ANTICOAGULANT injection 5,000 Units  5,000 Units Subcutaneous Q8H Marilyn Miller PA-C   5,000 Units at 01/23/25 0532    insulin aspart (NovoLOG) injection (RAPID ACTING)  1-7 Units Subcutaneous TID AC Marilyn Miller PA-C        insulin aspart (NovoLOG) injection (RAPID ACTING)  1-5 Units Subcutaneous At Bedtime Marilyn Miller PA-C        Lidocaine (LIDOCARE) 4 % Patch 1-2 patch  1-2 patch Transdermal Q24H Marilyn Miller PA-C   1 patch at 01/23/25 0933    methocarbamol (ROBAXIN) tablet 1,000 mg  1,000 mg Oral 4x Daily Marilyn Miller PA-C   1,000 mg at 01/23/25 0932    metoprolol tartrate (LOPRESSOR) tablet 25 mg  25 mg Oral BID Allison Gramajo PA-C   25 mg at 01/23/25 0932    pantoprazole (PROTONIX) 2 mg/mL suspension 40 mg  40 mg Oral or NG Tube Daily Marilyn Miller PA-C        Or    pantoprazole (PROTONIX) EC tablet 40 mg  40 mg Oral Daily Marilyn Miller PA-C   40 mg at 01/23/25 0932    polyethylene glycol (MIRALAX) Packet 17 g  17 g Oral BID Angella Haney PA-C   17 g at 01/22/25 1005    senna-docusate (SENOKOT-S/PERICOLACE) 8.6-50 MG per tablet 2 tablet  2 tablet Oral BID Angella Haney PA-C   2 tablet at 01/23/25 0932    sodium chloride (PF) 0.9% PF flush 3 mL  3 mL Intracatheter Q8H Marilyn Miller PA-C   3 mL at 01/23/25 0533     Current Facility-Administered Medications   Medication Dose Route Frequency Provider Last Rate Last Admin    bisacodyl (DULCOLAX) suppository 10 mg  10 mg Rectal Daily PRN Marilyn Miller PA-C        dextrose 10% infusion   Intravenous Continuous PRN Marilyn Miller PA-C        glucose gel 15-30 g  15-30 g Oral Q15 Min PRN Marilyn Miller PA-C        Or    dextrose 50 %  injection 25-50 mL  25-50 mL Intravenous Q15 Min PRN Miller, Marilyn C, PA-C        Or    glucagon injection 1 mg  1 mg Subcutaneous Q15 Min PRN Miller, Marilyn COE, PA-C        hydrALAZINE (APRESOLINE) injection 10 mg  10 mg Intravenous Q30 Min PRN Miller, Marilyn C, PA-C        HYDROmorphone (DILAUDID) injection 0.2 mg  0.2 mg Intravenous Q2H PRN Miller, Marilyn C, PA-C   0.2 mg at 01/20/25 2128    Or    HYDROmorphone (DILAUDID) injection 0.4 mg  0.4 mg Intravenous Q2H PRN Miller, Marilyn COE, PA-C   0.4 mg at 01/21/25 0916    hydrOXYzine HCl (ATARAX) tablet 25 mg  25 mg Oral Q6H PRN Miller, Marilyn COE, PA-C   25 mg at 01/21/25 0316    lidocaine (LMX4) cream   Topical Q1H PRN Miller, Marilyn COE, PA-C        lidocaine 1 % 0.1-1 mL  0.1-1 mL Other Q1H PRN Miller, Marilyn COE, PA-C        magnesium hydroxide (MILK OF MAGNESIA) suspension 30 mL  30 mL Oral Daily PRN Miller, Marilyn COE, PA-C        naloxone (NARCAN) injection 0.2 mg  0.2 mg Intravenous Q2 Min PRN Miller, Marilyn COE, PA-C        Or    naloxone (NARCAN) injection 0.4 mg  0.4 mg Intravenous Q2 Min PRN Miller, Marilyn COE, PA-C        Or    naloxone (NARCAN) injection 0.2 mg  0.2 mg Intramuscular Q2 Min PRN Miller, Marilyn COE, PA-C        Or    naloxone (NARCAN) injection 0.4 mg  0.4 mg Intramuscular Q2 Min PRN Miller, Marilyn COE, PA-C        ondansetron (ZOFRAN ODT) ODT tab 4 mg  4 mg Oral Q6H PRN Miller, Marilyn COE, PA-C        Or    ondansetron (ZOFRAN) injection 4 mg  4 mg Intravenous Q6H PRN Miller, Marilyn COE, PA-C   4 mg at 01/20/25 2318    oxyCODONE (ROXICODONE) tablet 5 mg  5 mg Oral Q4H PRN Miller, Marilyn COE PA-C   5 mg at 01/21/25 1234    Or    oxyCODONE (ROXICODONE) tablet 10 mg  10 mg Oral Q4H PRN Miller, Marilyn COE PA-C   10 mg at 01/21/25 0844    prochlorperazine (COMPAZINE) injection 10 mg  10 mg Intravenous Q6H PRN Paul, Marilyn COE PA-C   10 mg at 01/21/25 0107    Or    prochlorperazine (COMPAZINE) tablet 10 mg  10 mg Oral Q6H PRN Marilyn Miller PA-C        Reason beta blocker order  "not selected   Does not apply DOES NOT GO TO Marilyn Grigsby PA-C        sodium chloride (PF) 0.9% PF flush 3 mL  3 mL Intracatheter q1 min prn Marilyn Miller PA-C             Physical Exam  Vitals were reviewed  Blood pressure 115/60, pulse 50, temperature 98.1  F (36.7  C), temperature source Oral, resp. rate 18, height 1.803 m (5' 11\"), weight 82.7 kg (182 lb 4.8 oz), SpO2 96%.  Rhythm: NSR    Lungs: diminished bases    Cardiovascular: rrr, no m/r/g    Abdomen: soft, NT, ND, +BS    Extremeties: warm, no LE edema    Incision: right chest and groin incision CDI    CT: serosang output 340 mL, no air leak    Weight:   Vitals:    01/20/25 0600 01/21/25 0600 01/21/25 1449 01/22/25 0531   Weight: 85.3 kg (188 lb) 86.6 kg (190 lb 14.7 oz) 83.9 kg (185 lb) 83.1 kg (183 lb 4.8 oz)    01/23/25 0500   Weight: 82.7 kg (182 lb 4.8 oz)         Data  Recent Labs   Lab 01/23/25  1118 01/23/25  0528 01/22/25  2113 01/22/25  0735 01/22/25  0627 01/21/25  0751 01/21/25  0412 01/20/25  1449 01/20/25  1348 01/20/25  1230 01/20/25  1225   WBC  --  11.5*  --   --  14.3*  --  19.2*  --  16.8*  --  11.6*   HGB  --  13.5  --   --  12.8*  --  13.8  --  13.1*   < > 12.4*   MCV  --  87  --   --  88  --  86  --  87  --  88   PLT  --  242  --   --  189  --  241  --  196  --  155   INR  --   --   --   --   --   --   --   --  1.30*  --  1.46*   NA  --  144  --   --  141  --  138  --  143   < > 144   POTASSIUM  --  3.8  --   --  3.8  --  3.7  --  4.6   < > 4.6   CHLORIDE  --  110*  --   --  108*  --  105  --  112*  --  110*   CO2  --  26  --   --  23  --  24  --  23  --  26   BUN  --  9.6  --   --  9.5  --  9.4  --  9.3  --  9.7   CR  --  1.01  --   --  0.94  --  0.88  --  0.94  --  0.88   ANIONGAP  --  8  --   --  10  --  9  --  8  --  8   SOFI  --  8.9  --   --  8.4*  --  8.6*  --  8.3*  --  9.3   * 110* 102*   < > 116*   < > 151*   < > 124*   < > 142*   ALBUMIN  --   --   --   --   --   --  4.0  --  3.5  --   --    PROTTOTAL  --   " --   --   --   --   --  6.1*  --  5.1*  --   --    BILITOTAL  --   --   --   --   --   --  0.5  --  0.7  --   --    ALKPHOS  --   --   --   --   --   --  68  --  63  --   --    ALT  --   --   --   --   --   --  30  --  29  --   --    AST  --   --   --   --   --   --  33  --  23  --   --     < > = values in this interval not displayed.       Imaging:  Recent Results (from the past 24 hours)   XR Chest 2 Views    Narrative    EXAM: XR CHEST 2 VIEWS  LOCATION: [facility]  DATE: 1/23/2025    INDICATION: Chest tube removal.  COMPARISON: 1/21/2025.      Impression    IMPRESSION: Right chest tubes have been removed. Homer-Ayana catheter has also been removed. No pneumothorax. Small calcified granuloma in the right lower lobe. Small left pleural effusion with mild associated infiltrate or atelectasis at the left lung   base.           Patient seen and discussed with Dr. Mulvihill Brittany Smith, PA-C  Cardiothoracic Surgery  Available for paging [phone] (personal pager or CV Surgery Rounding Pager)  Personal Pager: [phone]  CV Surgery Rounding Pager: [phone]  After hours please page surgeon on-call

## 2025-01-23 NOTE — PLAN OF CARE
Goal Outcome Evaluation:      Plan of Care Reviewed With: patient    Overall Patient Progress: improvingOverall Patient Progress: improving         A/Ox4. VSS ex tachycardic at times (100's). Tele SR/ST LS clear. +bs, +flatus, -bm (pt stated he did have a bm on day shift). Mini thoracotomy site eccyhmotic, aramis/cdi with liquid bandage. Dressing to chest tube removal site cdi. Groin site cdi/aramis with liquid bandage. Managing pain with scheduled medications. Tolerating diet. Up independently in room. Slept between cares. Possible discharge today.

## 2025-01-23 NOTE — PLAN OF CARE
Date & Time: 1/23 5853-2098  Diagnosis: Aortic valve repair  Procedures: 1/20 - Excision of aortic mass   Orientation/Cognitive: AOx4  VS/O2: VSS, RA  Mobility: Indepenedent  Diet: Regular  Pain Management: Scheduled robaxin & tylenol, lidocaine patch  Neuro: Intact ex L field cut from previous strokes  Bowel & Bladder: Continent, BM 1/22  Skin: R chest incisions & CT site - ecchymotic, CDI  Abnormal Labs: WBC 11.5,  & 116  Tele: ST + BBB  IV Access/Drips/Fluids: L PIV SL  Drains: NA  Tests/Imaging: Chest xray - WDL  Consults: CV surgery  Discharge Plan: Home  Other: Hx of 3 embolic strokes    Discharge instructions & safety reviewed, all questions answered. Discharge medications given to patient & reviewed. Patient discharge home with family & all belongings.

## 2025-01-23 NOTE — CONSULTS
"SPIRITUAL HEALTH SERVICES  SPIRITUAL ASSESSMENT Consult Note  [facility] [place]     REFERRAL SOURCE: Assessment of spiritual and emotional needs because patient responded \"Yes\" to the question in the admission assessment, \"Do you have any spiritual or Scientology beliefs that will affect your care?\"     Reviewed documentation. Reflective conversation shared with Mundo and his brother, Viktor, which integrated elements of illness and family narratives.     Mundo shared that he grew up and remains a member of [employer], thought has not attended for some time. His mother was with him prior to surgery and \"prayed for me\" which he said \"calmed me, but also brought tears.\" Mundo is grateful that the surgery he had went well and was less invasive a procedure than had been expected. He requested prayer, which was provided with both Mundo and his brother, Viktor, who was present in the room and assisting with him getting ready for discharge.     I offered spiritual and emotional support through reflective listening that affirmed emotions, experience, and meaning.     PLAN: Nothing further at this time, due to impending discharge.    [facility]  Associate      [facility] available 24/7 for emergent requests/referrals, either by paging the on-call  or by entering an ASAP/STAT consult in Epic (this will also page the on-call ).     "

## 2025-01-23 NOTE — DISCHARGE SUMMARY
"Discharge Summary    Mundo Kern MRN# [id number]   YOB: 1975 Age: 49 year old     Date of Admission:  1/20/2025  Date of Discharge:  {DISCHARGE DATE:088963}  Admitting Physician:  Marlena Anne MD  Discharge Physician:  { :[id number]}  Discharging Service:  { :6467087}     Home clinic: {[facility]:4884169}  Primary Provider: Drew Gautam          Admission Diagnoses:   Aortic valve mass [I35.8]  S/P aortic valve repair [Z98.890]          Discharge Diagnosis:     {                 :1995768}             Discharge Disposition:     {                 :7179693::\"Discharged to home\"}           Condition on Discharge:     Discharge condition: {CONDITION:379106::\"Stable\"}   Discharge vitals: { :6635635}   Code status on discharge: {CODE STATUS:320697}           Procedures:   On *** Mr/Mrs *** successfully underwent *** by  ***          Medications Prior to Admission:     Medications Prior to Admission   Medication Sig Dispense Refill Last Dose/Taking    aspirin (ASA) 325 MG EC tablet Take 1 tablet (325 mg) by mouth daily. 30 tablet 0 Morning    atorvastatin (LIPITOR) 40 MG tablet Take 1 tablet (40 mg) by mouth or Feeding Tube every evening. 30 tablet 0 1/18/2025             Discharge Medications:     Current Discharge Medication List        START taking these medications    Details   acetaminophen (TYLENOL) 325 MG tablet Take 2 tablets (650 mg) by mouth every 6 hours as needed for mild pain.  Qty: 60 tablet, Refills: 0    Associated Diagnoses: S/P thoracotomy      methocarbamol 1000 MG TABS Take 1,000 mg by mouth 4 times daily as needed for muscle spasms.  Qty: 60 tablet, Refills: 0    Associated Diagnoses: S/P thoracotomy      metoprolol tartrate (LOPRESSOR) 25 MG tablet Take 1 tablet (25 mg) by mouth 2 times daily.  Qty: 60 tablet, Refills: 3    Associated Diagnoses: S/P thoracotomy      senna-docusate (SENOKOT-S/PERICOLACE) 8.6-50 MG tablet Take 2 tablets by mouth 2 " "times daily as needed for constipation.  Qty: 30 tablet, Refills: 0    Associated Diagnoses: S/P thoracotomy           CONTINUE these medications which have NOT CHANGED    Details   aspirin (ASA) 325 MG EC tablet Take 1 tablet (325 mg) by mouth daily.  Qty: 30 tablet, Refills: 0    Comments: Future refills by PCP [facility] with phone number [phone].  Associated Diagnoses: Cerebrovascular accident (CVA), unspecified mechanism (H)      atorvastatin (LIPITOR) 40 MG tablet Take 1 tablet (40 mg) by mouth or Feeding Tube every evening.  Qty: 30 tablet, Refills: 0    Comments: Future refills by PCP [facility] with phone number [phone].  Associated Diagnoses: Cerebrovascular accident (CVA), unspecified mechanism (H)                   Consultations:     Nutrition, Intensivist, ***             Brief History of Illness:   ***          Hospital Course:       Was extubated within 24 hours of surgery.   Had some transient hyperglycemia treated with an insulin gtt, transitioned to   sliding scale insulin and has *** need at discharge.  Had some fluid overload treated with diuretic medication. At discharge he/she   is *** above their pre-op weight and is sent with *** days of diuretics.   Heart rhythm remained stable. He/she progressed well with cardiac   rehab. They are discharged *** on pod# *** with the help of their family.              Significant Results:     Lab Results   Component Value Date    WBC 11.5 01/23/2025    WBC 5.3 07/19/2005     Lab Results   Component Value Date    RBC 4.46 01/23/2025    RBC 4.75 07/19/2005     Lab Results   Component Value Date    HGB 13.5 01/23/2025    HGB 14.6 07/19/2005     Lab Results   Component Value Date    HCT 39.0 01/23/2025    HCT 41.3 07/19/2005     No components found for: \"MCT\"  Lab Results   Component Value Date    MCV 87 01/23/2025    MCV 87 07/19/2005     Lab Results   Component Value Date    MCH 30.3 01/23/2025    MCH " 30.7 07/19/2005     Lab Results   Component Value Date    MCHC 34.6 01/23/2025    MCHC 35.3 07/19/2005     Lab Results   Component Value Date    RDW 12.4 01/23/2025    RDW 14.0 07/19/2005     Lab Results   Component Value Date     01/23/2025     07/19/2005       Last Basic Metabolic Panel:  Lab Results   Component Value Date     01/23/2025     07/19/2005      Lab Results   Component Value Date    POTASSIUM 3.8 01/23/2025    POTASSIUM 4.5 01/20/2025    POTASSIUM 3.1 07/19/2005     Lab Results   Component Value Date    CHLORIDE 110 01/23/2025    CHLORIDE 103 07/19/2005     Lab Results   Component Value Date    SOFI 8.9 01/23/2025    SOFI 8.9 07/19/2005     Lab Results   Component Value Date    CO2 26 01/23/2025    CO2 23 07/19/2005     Lab Results   Component Value Date    BUN 9.6 01/23/2025    BUN 5 07/19/2005     Lab Results   Component Value Date    CR 1.01 01/23/2025    CR 1.00 07/19/2005     Lab Results   Component Value Date     01/23/2025     01/23/2025     07/19/2005                  Pending Results:     None           Discharge Instructions and Follow-Up:     Discharge diet: Regular   Discharge activity: Daily weights: Call if weight gain 2-3 lbs over 24 hours or if greater than 5 lbs in 1 week.  No lifting more than 10 lbs for 8-12 weeks.  No driving for 1 month.  Call for pain medication refill.  Call for temperature greater than 101.0  Daily shower with antibacterial soap.   Discharge follow-up: ***   Outpatient therapy: Cardiac rehab   Home Care agency: None    Supplies and equipment: None   Lines and drains: None    Wound care: Wash incision daily with antibacterial soap   Other instructions: None               Last Basic Metabolic Panel:  Lab Results   Component Value Date     01/23/2025     07/19/2005      Lab Results   Component Value Date    POTASSIUM 3.8 01/23/2025    POTASSIUM 4.5 01/20/2025    POTASSIUM 3.1 07/19/2005     Lab Results   Component Value Date    CHLORIDE 110 01/23/2025    CHLORIDE 103 07/19/2005     Lab Results   Component Value Date    SOFI 8.9 01/23/2025    SOFI 8.9 07/19/2005     Lab Results   Component Value Date    CO2 26 01/23/2025    CO2 23 07/19/2005     Lab Results   Component Value Date    BUN 9.6 01/23/2025    BUN 5 07/19/2005     Lab Results   Component Value Date    CR 1.01 01/23/2025    CR 1.00 07/19/2005     Lab Results   Component Value Date     01/23/2025     01/23/2025     07/19/2005                  Pending Results:     None           Discharge Instructions and Follow-Up:     Discharge diet: Regular   Discharge activity: Daily weights: Call if weight gain 2-3 lbs over 24 hours or if greater than 5 lbs in 1 week.  No lifting more than 10 lbs for 8-12 weeks.  No driving for 1 month.  Call for pain medication refill.  Call for temperature greater than 101.0  Daily shower with antibacterial soap.   Discharge follow-up: POST-OPERATIVE CLINIC VISITS  CV Surgery Advance Care Practitioners on 2/10/25 at 1:45pm at the [facility] at [facility] in [place].    Cardiology, Dr. Martell, on 3/11/25 at the [facility]  Cardiac rehab on 1/30/25 at 8am please call [phone] if you need to reschedule  If you need to cancel or reschedule your cardiology appointments please call [phone].    Outpatient therapy: Cardiac rehab   Home Care agency: None    Supplies and equipment: None   Lines and drains: None    Wound care: Wash incision daily with antibacterial soap   Other instructions: None

## 2025-01-27 ENCOUNTER — TELEPHONE (OUTPATIENT)
Dept: CARDIOLOGY | Facility: CLINIC | Age: 50
End: 2025-01-27
Payer: COMMERCIAL

## 2025-01-27 NOTE — TELEPHONE ENCOUNTER
Patient's mom called wondering how often he should be doing his IS and whether he should be monitoring his BP/HR - advised to do IS 3-4x/day or more if SOB and to monitory BP and HR.  Normal parameters given and instructed to call if he's low or high; patient's mom verbalized understanding.

## 2025-02-03 ENCOUNTER — HOSPITAL ENCOUNTER (OUTPATIENT)
Dept: CARDIAC REHAB | Facility: CLINIC | Age: 50
Discharge: HOME | End: 2025-02-03
Attending: SURGERY
Payer: COMMERCIAL

## 2025-02-03 PROCEDURE — 93798 PHYS/QHP OP CAR RHAB W/ECG: CPT

## 2025-02-05 ENCOUNTER — HOSPITAL ENCOUNTER (OUTPATIENT)
Dept: CARDIAC REHAB | Facility: CLINIC | Age: 50
Discharge: HOME | End: 2025-02-05
Attending: SURGERY
Payer: COMMERCIAL

## 2025-02-05 PROCEDURE — 93798 PHYS/QHP OP CAR RHAB W/ECG: CPT | Performed by: REHABILITATION PRACTITIONER

## 2025-02-06 ENCOUNTER — THERAPY VISIT (OUTPATIENT)
Dept: OCCUPATIONAL THERAPY | Facility: CLINIC | Age: 50
End: 2025-02-06
Payer: COMMERCIAL

## 2025-02-06 DIAGNOSIS — I63.9 ACUTE ISCHEMIC STROKE (H): Primary | ICD-10-CM

## 2025-02-06 DIAGNOSIS — R27.9 INCOORDINATION: ICD-10-CM

## 2025-02-06 NOTE — PROGRESS NOTES
"Occupational Therapy  Discharge Note     02/06/25 0500   Appointment Info   Treating Provider Ansley Ochoa, OTR/L   Total/Authorized Visits 2 Medicaid [place]   Medical Diagnosis Acute Ischemic Stroke   OT Tx Diagnosis Incoordination   Other pertinent information Lifting restrictions after 1/20:  no lifting greater than 10# or above head   Progress Note/Certification   Start Of Care Date 01/15/25   Onset of Illness/Injury or Date of Surgery 12/09/25   Therapy Frequency 1x/week   Predicted Duration 60 days   Certification date from 01/15/25   Certification date to 03/16/25   Goals   OT Goals 2   OT Goal 1   Goal Identifier 1-FMC   Goal Description Patient will demonstrate improved R hand FMC as demonstrated by completing the 9HPT in 25 seconds or left to improve ability to write and play his guitar.   Target Date 03/16/25   OT Goal 2   Goal Identifier 2-Handwriting   Goal Description Patient will demonstrate 50% improvement in handwriting to improve legibility to pay bills address envelopes, take notes, etc, per patient report.   Target Date 03/16/25   Subjective Report   Subjective Report Patient continues to have challenges playing the guitar with his R hand but hasn't been doing much other than that.  Reports some challenges with getting motivated to do things at home.  Now seeing cardiac rehab 3x/week.   Objective Measures   Objective Measures Objective Measure 1;Objective Measure 2   Objective Measure 1   Objective Measure R 9HPT   Details 35.36\" (was 35.10\")   Neuromuscular Re-education   Neuromuscular Re-ed Minutes (21125) 43   Neuro Re-ed 1 - Details To improve R hand FMC for writing, playing guitar, buttoning, and self cares, patient educated on FMC HEP and checked off ideas for patient to do at home. Patient completed the following in session: Dexteria leopoldo (tap it and pinch it), lifting each finger off of table separately simulating piano playing, picking up and stacking various coins, dealing/shuffling cards, " various pegboards facilitating in hand manipulation tasks which were very difficult.  Pt educated in ways to grade activities such as using smaller objects, increasing repetitions, etc. Handout issued for home program, 100% comprehension.  Transitioned to handwriting using built up writing  due to decreased proprioception/ on pen, cues needed to relax hand and slow down with writing, emphasizing amplitude of each letter. Encouraged patient to write and play guitar daily.   Patient Response/Progress mostly has been practicing the guitar and writing has been getting easier., doesn't feel he needs more OT   Neuro Re-ed 1 FMC and handwriting   Skilled Intervention skilled selection and facilitation of fine motor actvities to improve ease with leisure activities and handwriting   Plan   Home program [facility]   Plan for next session D/C from OT   Total Session Time   Timed Code Treatment Minutes 43   Total Treatment Time (sum of timed and untimed services) 43         DISCHARGE  Reason for Discharge: Patient chooses to discontinue therapy.    Equipment Issued: [facility]    Discharge Plan: Patient to continue home program.    Referring Provider:  Yudy Scott

## 2025-02-07 ENCOUNTER — HOSPITAL ENCOUNTER (OUTPATIENT)
Dept: CARDIAC REHAB | Facility: CLINIC | Age: 50
Discharge: HOME | End: 2025-02-07
Attending: SURGERY
Payer: COMMERCIAL

## 2025-02-07 PROCEDURE — 93798 PHYS/QHP OP CAR RHAB W/ECG: CPT | Performed by: REHABILITATION PRACTITIONER

## 2025-02-10 ENCOUNTER — HOSPITAL ENCOUNTER (OUTPATIENT)
Dept: CARDIAC REHAB | Facility: CLINIC | Age: 50
Discharge: HOME | End: 2025-02-10
Attending: SURGERY
Payer: COMMERCIAL

## 2025-02-10 ENCOUNTER — OFFICE VISIT (OUTPATIENT)
Dept: CARDIOLOGY | Facility: CLINIC | Age: 50
End: 2025-02-10
Payer: COMMERCIAL

## 2025-02-10 VITALS
WEIGHT: 185 LBS | BODY MASS INDEX: 25.9 KG/M2 | HEIGHT: 71 IN | DIASTOLIC BLOOD PRESSURE: 71 MMHG | SYSTOLIC BLOOD PRESSURE: 105 MMHG | HEART RATE: 83 BPM | OXYGEN SATURATION: 97 %

## 2025-02-10 DIAGNOSIS — I10 BENIGN ESSENTIAL HYPERTENSION: Primary | ICD-10-CM

## 2025-02-10 PROCEDURE — 99024 POSTOP FOLLOW-UP VISIT: CPT | Performed by: PHYSICIAN ASSISTANT

## 2025-02-10 PROCEDURE — 93798 PHYS/QHP OP CAR RHAB W/ECG: CPT

## 2025-02-10 RX ORDER — METOPROLOL SUCCINATE 25 MG/1
25 TABLET, EXTENDED RELEASE ORAL DAILY
Qty: 30 TABLET | Refills: 3 | Status: SHIPPED | OUTPATIENT
Start: 2025-02-10

## 2025-02-10 NOTE — LETTER
February 10, 2025      Mundo Kern  [address] [id number]        To Whom It May Concern:    Mundo Kern was seen in our clinic. He may return to work with no restrictions on Monday, March 3, 2025.      Sincerely,          Marilyn Miller PA-C on 2/10/2025 at 2:29 PM

## 2025-02-10 NOTE — PATIENT INSTRUCTIONS
You are doing great! Keep up the good work.  Your activity restrictions (no lifting over 20 pounds) end 02/17/2025.  You are ok to start driving  Stop the short acting Lopressor and start taking one daily Toprol at the decreased dose.

## 2025-02-10 NOTE — PROGRESS NOTES
"CARDIOTHORACIC SURGERY FOLLOW-UP VISIT     Mundo Kern   1975   [id number]      Reason for visit: Post-Op right mini-thoracotomy endoscopic-assisted removal or intracardiac mass with Dr. Marlena Anne on 01/20/2025    HPI:   Mundo Kern is a 49 year old male with a past medical history of acute ischemic CVA with multifocal L MCA infarct 07/2023, subacute right PCA infarct 04/2024, large acute to subacute right paramedian cerebellar infarct 12/2024; etiology of strokes presumed to be embolic from intracardiac mass which was seen on [facility] 12/11. Referred for surgical mass excision. Neurology recommended waiting several weeks from last stroke to minimize risk of hemorrhagic transformation. Admitted 01/20 following elective right mini-thoracotomy endoscopic-assisted removal or intracardiac mass, right common femoral arterial and venous cannulation for cardiopulmonary bypass.      From Dr. Anne's operative note 01/20: He had a trileaflet aortic valve with no stenosis or insufficiency.  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  It did not have the typical appearance of a papillary fibroblastoma and it was too long for a Lambl's excrescence, but it definitely did not look infectious in nature or a healed vegetation.  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  Regardless, it was fully excised from the attachment and was sent to pathology as a permanent specimen in one piece.  No other abnormalities were seen. Surgical pathology returned as \"Collagenized, endothelial-lined connective tissue excrescences with myxoid change; benign. The histologic differential diagnosis would include chordae tendineae and Lambl excrescence; papillary fibroelastoma not excluded. The [place] red stain is negative for amyloid.\"    Patient " reports doing well since hospital discharge. Reports incisions are well healing, no erythema tenderness, or drainage. Denies fevers or chills. No chest pain, SOB, LE edema, orthopnea, or PND. No palpitations, dizziness, or lightheadedness. He has abstained from alcohol and cigarettes since his last CVA in December.      PAST MEDICAL HISTORY:  Past Medical History:   Diagnosis Date    Anxiety     Aortic valve mass     Atrial fibrillation (H)     Cerebrovascular accident (H)     Migraine        PAST SURGICAL HISTORY:  Past Surgical History:   Procedure Laterality Date    REPAIR VALVE AORTIC MINIMALLY INVASIVE N/A 1/20/2025    Procedure: MINIMALLY INVASIVE EXCISION OF AORTIC VALVE MASS  ON PUMP/TRANSESOPHAGEAL ECHOCARDIOGRAM PER ANESTHESIA;  Surgeon: Marlena Anne MD;  Location:  OR    TRANSESOPHAGEAL ECHOCARDIOGRAM INTRAOPERATIVE N/A 12/11/2024    Procedure: Transesophageal echocardiogram intraoperative;  Surgeon: GENERIC ANESTHESIA PROVIDER;  Location:  OR       CURRENT MEDICATIONS:   Current Outpatient Medications   Medication Sig Dispense Refill    acetaminophen (TYLENOL) 325 MG tablet Take 2 tablets (650 mg) by mouth every 6 hours as needed for mild pain. 60 tablet 0    aspirin (ASA) 325 MG EC tablet Take 1 tablet (325 mg) by mouth daily. 30 tablet 0    atorvastatin (LIPITOR) 40 MG tablet Take 1 tablet (40 mg) by mouth or Feeding Tube every evening. 30 tablet 0    metoprolol succinate ER (TOPROL XL) 25 MG 24 hr tablet Take 1 tablet (25 mg) by mouth daily. 30 tablet 3    metoprolol tartrate (LOPRESSOR) 25 MG tablet Take 1 tablet (25 mg) by mouth 2 times daily. 60 tablet 3     No current facility-administered medications for this visit.       ALLERGIES:    No Known Allergies      ROS:  Review of symptoms otherwise negative unless commented about in HPI.     LABS:  Last Basic Metabolic Panel:  Lab Results   Component Value Date     01/23/2025     07/19/2005      Lab Results   Component  "Value Date    POTASSIUM 3.8 01/23/2025    POTASSIUM 4.5 01/20/2025    POTASSIUM 3.1 07/19/2005     Lab Results   Component Value Date    CHLORIDE 110 01/23/2025    CHLORIDE 103 07/19/2005     Lab Results   Component Value Date    SOFI 8.9 01/23/2025    SOFI 8.9 07/19/2005     Lab Results   Component Value Date    CO2 26 01/23/2025    CO2 23 07/19/2005     Lab Results   Component Value Date    BUN 9.6 01/23/2025    BUN 5 07/19/2005     Lab Results   Component Value Date    CR 1.01 01/23/2025    CR 1.00 07/19/2005     Lab Results   Component Value Date     01/23/2025     01/23/2025     07/19/2005       Last CBC:   Lab Results   Component Value Date    WBC 11.5 01/23/2025    WBC 5.3 07/19/2005     Lab Results   Component Value Date    RBC 4.46 01/23/2025    RBC 4.75 07/19/2005     Lab Results   Component Value Date    HGB 13.5 01/23/2025    HGB 14.6 07/19/2005     Lab Results   Component Value Date    HCT 39.0 01/23/2025    HCT 41.3 07/19/2005     No components found for: \"MCT\"  Lab Results   Component Value Date    MCV 87 01/23/2025    MCV 87 07/19/2005     Lab Results   Component Value Date    MCH 30.3 01/23/2025    MCH 30.7 07/19/2005     Lab Results   Component Value Date    MCHC 34.6 01/23/2025    MCHC 35.3 07/19/2005     Lab Results   Component Value Date    RDW 12.4 01/23/2025    RDW 14.0 07/19/2005     Lab Results   Component Value Date     01/23/2025     07/19/2005       INR:  Lab Results   Component Value Date    INR 1.30 01/20/2025    INR 1.46 01/20/2025    INR 1.03 12/13/2024    INR 1.05 07/23/2023    INR 0.96 07/22/2023         IMAGING:  None    PHYSICAL EXAM:   /71   Pulse 83   Ht 1.803 m (5' 11\")   Wt 83.9 kg (185 lb)   SpO2 97%   BMI 25.80 kg/m        General: alert and oriented x 3, pleasant, no acute distress, normal mood and affect  Neuro: no focal deficits   CV: S1 S2, no murmurs, rubs or gallops, regular rate and rhythm, no peripheral edema  Pulm: " bilateral breath sounds, clear to auscultation, easy work of breathing  Incision: right chest and groin incisions clean dry and intact without erythema, swelling or drainage       ASSESSMENT/PLAN:  Mundo Kern is a 49 year old year old male status post right mini-thoracotomy endoscopic-assisted removal or intracardiac mass who returns to clinic for postop visit.     Surgically doing well overall. Incisions are healing well with no signs of infection. Increasing activity and strength overall.   BP is soft, he is asymptomatic. Transition Lopressor 25 mg BID to Toprol 25 mg daily.    Follow up with cardiology and neurology as scheduled.  Follow up with PCP as scheduled.  Continue Outpatient Cardiac Rehab until completed.   Activity restrictions end 02/17. Return to work letter provided today.   Ok to drive from surgical perspective.    Marilyn Miller PA-C  Cardiothoracic Surgery  Pager [phone]      CC  Drew Gautam

## 2025-02-12 ENCOUNTER — HOSPITAL ENCOUNTER (OUTPATIENT)
Dept: CARDIAC REHAB | Facility: CLINIC | Age: 50
Discharge: HOME | End: 2025-02-12
Attending: SURGERY
Payer: COMMERCIAL

## 2025-02-12 PROCEDURE — 93798 PHYS/QHP OP CAR RHAB W/ECG: CPT | Performed by: REHABILITATION PRACTITIONER

## 2025-02-14 ENCOUNTER — HOSPITAL ENCOUNTER (OUTPATIENT)
Dept: CARDIAC REHAB | Facility: CLINIC | Age: 50
Discharge: HOME | End: 2025-02-14
Attending: SURGERY
Payer: MEDICAID

## 2025-02-14 PROCEDURE — 93798 PHYS/QHP OP CAR RHAB W/ECG: CPT | Performed by: REHABILITATION PRACTITIONER

## 2025-02-17 ENCOUNTER — HOSPITAL ENCOUNTER (OUTPATIENT)
Dept: CARDIAC REHAB | Facility: CLINIC | Age: 50
Discharge: HOME | End: 2025-02-17
Attending: SURGERY
Payer: MEDICAID

## 2025-02-17 PROCEDURE — 93798 PHYS/QHP OP CAR RHAB W/ECG: CPT

## 2025-02-19 ENCOUNTER — HOSPITAL ENCOUNTER (OUTPATIENT)
Dept: CARDIAC REHAB | Facility: CLINIC | Age: 50
Discharge: HOME | End: 2025-02-19
Attending: SURGERY
Payer: MEDICAID

## 2025-02-19 PROCEDURE — 93798 PHYS/QHP OP CAR RHAB W/ECG: CPT | Performed by: REHABILITATION PRACTITIONER

## 2025-02-21 ENCOUNTER — HOSPITAL ENCOUNTER (OUTPATIENT)
Dept: CARDIAC REHAB | Facility: CLINIC | Age: 50
Discharge: HOME | End: 2025-02-21
Attending: SURGERY
Payer: MEDICAID

## 2025-02-21 PROCEDURE — 93798 PHYS/QHP OP CAR RHAB W/ECG: CPT

## 2025-02-24 ENCOUNTER — HOSPITAL ENCOUNTER (OUTPATIENT)
Dept: CARDIAC REHAB | Facility: CLINIC | Age: 50
Discharge: HOME | End: 2025-02-24
Attending: SURGERY
Payer: MEDICAID

## 2025-02-24 PROCEDURE — 93798 PHYS/QHP OP CAR RHAB W/ECG: CPT

## 2025-02-26 ENCOUNTER — HOSPITAL ENCOUNTER (OUTPATIENT)
Dept: CARDIAC REHAB | Facility: CLINIC | Age: 50
Discharge: HOME | End: 2025-02-26
Attending: SURGERY
Payer: MEDICAID

## 2025-02-26 PROCEDURE — 93798 PHYS/QHP OP CAR RHAB W/ECG: CPT | Performed by: REHABILITATION PRACTITIONER

## 2025-02-28 ENCOUNTER — HOSPITAL ENCOUNTER (OUTPATIENT)
Dept: CARDIAC REHAB | Facility: CLINIC | Age: 50
Discharge: HOME | End: 2025-02-28
Attending: SURGERY
Payer: MEDICAID

## 2025-02-28 PROCEDURE — 93798 PHYS/QHP OP CAR RHAB W/ECG: CPT | Performed by: REHABILITATION PRACTITIONER

## 2025-03-03 ENCOUNTER — HOSPITAL ENCOUNTER (OUTPATIENT)
Dept: CARDIAC REHAB | Facility: CLINIC | Age: 50
Discharge: HOME | End: 2025-03-03
Attending: SURGERY
Payer: MEDICAID

## 2025-03-03 PROCEDURE — 93798 PHYS/QHP OP CAR RHAB W/ECG: CPT | Performed by: REHABILITATION PRACTITIONER

## 2025-03-12 ENCOUNTER — HOSPITAL ENCOUNTER (OUTPATIENT)
Dept: CARDIAC REHAB | Facility: CLINIC | Age: 50
Discharge: HOME | End: 2025-03-12
Attending: SURGERY
Payer: COMMERCIAL

## 2025-03-12 PROCEDURE — 93798 PHYS/QHP OP CAR RHAB W/ECG: CPT | Performed by: REHABILITATION PRACTITIONER

## 2025-03-21 PROCEDURE — 88313 SPECIAL STAINS GROUP 2: CPT

## 2025-03-21 PROCEDURE — 88323 CONSLTJ&REPRT MATRL PREP SLD: CPT

## 2025-03-26 LAB
PATH REPORT.ADDENDUM SPEC: NORMAL
PATH REPORT.ADDENDUM SPEC: NORMAL
PATH REPORT.COMMENTS IMP SPEC: NORMAL
PATH REPORT.FINAL DX SPEC: NORMAL
PATH REPORT.GROSS SPEC: NORMAL
PATH REPORT.MICROSCOPIC SPEC OTHER STN: NORMAL
PATH REPORT.RELEVANT HX SPEC: NORMAL
PHOTO IMAGE: NORMAL

## 2025-03-27 NOTE — CONFIDENTIAL NOTE
NEUROLOGY - PRE VISIT PLANNING             Referring Provider Reason for Referral Office Visit Notes   Brice Rahman MD   [facility]   Acute ischemic stroke (H)  12/9/2024 - 12/14/2024        IMAGING/NOTES/SCANS Status/ Location  DATE   MRI/MRA(HEAD, NECK, SPINE) Internal   External - [facility] - PACS 12/9/2024, 7/22/2023 04/5/2024    CT/CTA   Internal  External - [facility] - PACS 07/22/2023 04/02/2024    EMG N/A    EEG N/A    LABS Internal    Neuropsychological testing  N/A    Additional Testing/Notes N/A

## 2025-04-07 LAB
Lab: NORMAL
PERFORMING LABORATORY: NORMAL
SPECIMEN STATUS: NORMAL
TEST NAME: NORMAL

## 2025-04-10 RX ORDER — SENNOSIDES 8.6 MG
325 CAPSULE ORAL DAILY
Qty: 30 TABLET | Refills: 0 | OUTPATIENT
Start: 2025-04-10

## 2025-04-10 RX ORDER — ATORVASTATIN CALCIUM 40 MG/1
40 TABLET, FILM COATED ORAL EVERY EVENING
Qty: 30 TABLET | Refills: 0 | OUTPATIENT
Start: 2025-04-10

## 2025-04-16 ENCOUNTER — OFFICE VISIT (OUTPATIENT)
Dept: CARDIOLOGY | Facility: CLINIC | Age: 50
End: 2025-04-16
Payer: COMMERCIAL

## 2025-04-16 VITALS
SYSTOLIC BLOOD PRESSURE: 122 MMHG | DIASTOLIC BLOOD PRESSURE: 81 MMHG | BODY MASS INDEX: 26.19 KG/M2 | OXYGEN SATURATION: 99 % | HEIGHT: 71 IN | WEIGHT: 187.1 LBS | HEART RATE: 76 BPM

## 2025-04-16 DIAGNOSIS — I63.9 CEREBROVASCULAR ACCIDENT (CVA), UNSPECIFIED MECHANISM (H): Primary | ICD-10-CM

## 2025-04-16 DIAGNOSIS — Z98.890 S/P THORACOTOMY: ICD-10-CM

## 2025-04-16 DIAGNOSIS — I35.8 AORTIC VALVE MASS: ICD-10-CM

## 2025-04-16 DIAGNOSIS — I63.9 ACUTE ISCHEMIC STROKE (H): ICD-10-CM

## 2025-04-16 NOTE — LETTER
4/16/2025    Drew Gautam MD  [address] [place] [address]    RE: Mundo Kern       Dear Colleague,     I had the pleasure of seeing Mundo Kern in the [facility].  Primary cardiologist: Dr. 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  He has an underlying history of alcohol and tobacco abuse.  His surgery was uncomplicated, he did attend cardiac rehab postdischarge, he is here for follow-up visit today.  He is present with his mother had several questions in regards to his medications.  Preoperatively he had a CTA which showed mild nonobstructive coronary disease and he was placed on atorvastatin.  He is also taking a baby aspirin and low-dose metoprolol succinate postoperatively.  He questions whether or not he can come off these medications.  We discussed the purpose of these medications and preventative medicine and taking statin.  Labs reviewed.  On exam he is normotensive, regular heart rate, no murmur and clear lung fields    Summary    1.  Intracardiac mass with recurrent CVA-status post surgical resection via minithoracotomy.  Recommend follow-up echocardiogram in 1 year from the time of surgery.    2.  Recurrent ischemic/embolic stroke-unclear if this filamentous mass was the cause of his recurrent strokes as they have been labeled ischemic by neurology.  He has a follow-up visit with neurology in May, I would recommend discussing with them their recommendation on long-term aspirin therapy    3.  Hyperlipidemia-now on moderate intensity atorvastatin, needs a follow-up fasting lipid profile liver function test with PMD    Please feel free to contact me with any questions given her*         Today's clinic visit entailed:  Review of external notes as  documented elsewhere in note  Review of the result(s) of each unique test - op note, BMP, lipids, CBC    Provider  Link to MDM Help Grid     The level of medical decision making during this visit was of moderate complexity.    No orders of the defined types were placed in this encounter.    No orders of the defined types were placed in this encounter.    Medications Discontinued During This Encounter   Medication Reason     metoprolol tartrate (LOPRESSOR) 25 MG tablet Dose adjustment         No diagnosis found.    CURRENT MEDICATIONS:  Current Outpatient Medications   Medication Sig Dispense Refill     acetaminophen (TYLENOL) 325 MG tablet Take 2 tablets (650 mg) by mouth every 6 hours as needed for mild pain. 60 tablet 0     aspirin (ASA) 325 MG EC tablet Take 1 tablet (325 mg) by mouth daily. 30 tablet 0     atorvastatin (LIPITOR) 40 MG tablet Take 1 tablet (40 mg) by mouth or Feeding Tube every evening. 30 tablet 0     metoprolol succinate ER (TOPROL XL) 25 MG 24 hr tablet Take 1 tablet (25 mg) by mouth daily. 30 tablet 3       ALLERGIES   No Known Allergies    PAST MEDICAL HISTORY:  Past Medical History:   Diagnosis Date     Anxiety      Aortic valve mass      Atrial fibrillation (H)      Cerebrovascular accident (H)      Migraine        PAST SURGICAL HISTORY:  Past Surgical History:   Procedure Laterality Date     REPAIR VALVE AORTIC MINIMALLY INVASIVE N/A 1/20/2025    Procedure: MINIMALLY INVASIVE EXCISION OF AORTIC VALVE MASS  ON PUMP/TRANSESOPHAGEAL ECHOCARDIOGRAM PER ANESTHESIA;  Surgeon: Marlena Anne MD;  Location:  OR     TRANSESOPHAGEAL ECHOCARDIOGRAM INTRAOPERATIVE N/A 12/11/2024    Procedure: Transesophageal echocardiogram intraoperative;  Surgeon: GENERIC ANESTHESIA PROVIDER;  Location:  OR       FAMILY HISTORY:  No family history on file.    SOCIAL HISTORY:  Social History     Socioeconomic History     Marital status: Single     Spouse name: None     Number of children: None      Years of education: None     Highest education level: None   Tobacco Use     Smoking status: Former     Types: Cigarettes     Smokeless tobacco: Never   Substance and Sexual Activity     Alcohol use: Not Currently     Drug use: Never     Social Drivers of Health     Financial Resource Strain: Low Risk  (1/20/2025)    Financial Resource Strain      Within the past 12 months, have you or your family members you live with been unable to get utilities (heat, electricity) when it was really needed?: No   Food Insecurity: Low Risk  (1/20/2025)    Food Insecurity      Within the past 12 months, did you worry that your food would run out before you got money to buy more?: No      Within the past 12 months, did the food you bought just not last and you didn t have money to get more?: No   Transportation Needs: Low Risk  (1/20/2025)    Transportation Needs      Within the past 12 months, has lack of transportation kept you from medical appointments, getting your medicines, non-medical meetings or appointments, work, or from getting things that you need?: No    [facility]    Social Connections   Interpersonal Safety: Low Risk  (1/20/2025)    Interpersonal Safety      Do you feel physically and emotionally safe where you currently live?: Yes      Within the past 12 months, have you been hit, slapped, kicked or otherwise physically hurt by someone?: No      Within the past 12 months, have you been humiliated or emotionally abused in other ways by your partner or ex-partner?: No   Housing Stability: Low Risk  (1/20/2025)    Housing Stability      Do you have housing? : Yes      Are you worried about losing your housing?: No       Review of Systems:  Skin:        Eyes:       ENT:       Respiratory:  Negative shortness of breath  Cardiovascular:  Negative, palpitations, chest pain, edema, fatigue, dizziness Positive for, lightheadedness  Gastroenterology:      Genitourinary:      "  Musculoskeletal:       Neurologic:       Psychiatric:       Heme/Lymph/Imm:       Endocrine:         Physical Exam:  Vitals: /81 (BP Location: Right arm, Patient Position: Sitting)   Pulse 76   Ht 1.803 m (5' 11\")   Wt 84.9 kg (187 lb 1.6 oz)   SpO2 99%   BMI 26.10 kg/m      Constitutional:  cooperative, in no acute distress        Skin:  warm and dry to the touch          Head:  normocephalic        Eyes:  pupils equal and round        Lymph:      ENT:  dentition good        Neck:  no carotid bruit        Respiratory:  clear to auscultation, normal symmetry         Cardiac: regular rhythm, no murmurs, gallops or rubs detected                pulses full and equal                                        GI:  abdomen soft        Extremities and Muscular Skeletal:  no deformities, clubbing, cyanosis, erythema observed, no edema              Neurological:  no gross motor deficits, affect appropriate        Psych:  Alert and Oriented x 3        Recent Lab Results:  LIPID RESULTS:  Lab Results   Component Value Date    CHOL 217 (H) 12/09/2024    HDL 40 12/09/2024     (H) 12/09/2024    TRIG 162 (H) 12/09/2024       LIVER ENZYME RESULTS:  Lab Results   Component Value Date    AST 33 01/21/2025    ALT 30 01/21/2025       CBC RESULTS:  Lab Results   Component Value Date    WBC 11.5 (H) 01/23/2025    WBC 5.3 07/19/2005    RBC 4.46 01/23/2025    RBC 4.75 07/19/2005    HGB 13.5 01/23/2025    HGB 14.6 07/19/2005    HCT 39.0 (L) 01/23/2025    HCT 41.3 07/19/2005    MCV 87 01/23/2025    MCV 87 07/19/2005    MCH 30.3 01/23/2025    MCH 30.7 07/19/2005    MCHC 34.6 01/23/2025    MCHC 35.3 07/19/2005    RDW 12.4 01/23/2025    RDW 14.0 07/19/2005     01/23/2025     07/19/2005       BMP RESULTS:  Lab Results   Component Value Date     01/23/2025     07/19/2005    POTASSIUM 3.8 01/23/2025    POTASSIUM 4.5 01/20/2025    POTASSIUM 3.1 (L) 07/19/2005    CHLORIDE 110 (H) 01/23/2025    CHLORIDE 103 " 07/19/2005    CO2 26 01/23/2025    CO2 23 07/19/2005    ANIONGAP 8 01/23/2025    ANIONGAP 9 07/19/2005     (H) 01/23/2025     (H) 01/23/2025     (H) 07/19/2005    BUN 9.6 01/23/2025    BUN 5 07/19/2005    CR 1.01 01/23/2025    CR 1.00 07/19/2005    GFRESTIMATED >90 01/23/2025    GFRESTIMATED >80 07/19/2005    GFRESTBLACK >80 07/19/2005    SOFI 8.9 01/23/2025    SOFI 8.9 07/19/2005        A1C RESULTS:  Lab Results   Component Value Date    A1C 5.7 (H) 01/20/2025       INR RESULTS:  Lab Results   Component Value Date    INR 1.30 (H) 01/20/2025    INR 1.46 (H) 01/20/2025           CC  Referred Self, MD  No address on file                  Thank you for allowing me to participate in the care of your patient.      Sincerely,     Ester Wray,      [facility]  cc:   Referred MD Heriberto  No address on file

## 2025-04-16 NOTE — PROGRESS NOTES
Primary cardiologist: Dr. 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  He has an underlying history of alcohol and tobacco abuse.  His surgery was uncomplicated, he did attend cardiac rehab postdischarge, he is here for follow-up visit today.  He is present with his mother had several questions in regards to his medications.  Preoperatively he had a CTA which showed mild nonobstructive coronary disease and he was placed on atorvastatin.  He is also taking a baby aspirin and low-dose metoprolol succinate postoperatively.  He questions whether or not he can come off these medications.  We discussed the purpose of these medications and preventative medicine and taking statin.  Labs reviewed.  On exam he is normotensive, regular heart rate, no murmur and clear lung fields    Summary    1.  Intracardiac mass with recurrent CVA-status post surgical resection via minithoracotomy.  Recommend follow-up echocardiogram in 1 year from the time of surgery.    2.  Recurrent ischemic/embolic stroke-unclear if this filamentous mass was the cause of his recurrent strokes as they have been labeled ischemic by neurology.  He has a follow-up visit with neurology in May, I would recommend discussing with them their recommendation on long-term aspirin therapy    3.  Hyperlipidemia-now on moderate intensity atorvastatin, needs a follow-up fasting lipid profile liver function test with PMD    Please feel free to contact me with any questions given her*         Today's clinic visit entailed:  Review of external notes as documented elsewhere in note  Review of the result(s) of each unique test - op note, BMP, lipids, CBC    Provider  Link to [facility] Help Grid     The level of medical decision making during this visit was of moderate  complexity.    No orders of the defined types were placed in this encounter.    No orders of the defined types were placed in this encounter.    Medications Discontinued During This Encounter   Medication Reason    metoprolol tartrate (LOPRESSOR) 25 MG tablet Dose adjustment         No diagnosis found.    CURRENT MEDICATIONS:  Current Outpatient Medications   Medication Sig Dispense Refill    acetaminophen (TYLENOL) 325 MG tablet Take 2 tablets (650 mg) by mouth every 6 hours as needed for mild pain. 60 tablet 0    aspirin (ASA) 325 MG EC tablet Take 1 tablet (325 mg) by mouth daily. 30 tablet 0    atorvastatin (LIPITOR) 40 MG tablet Take 1 tablet (40 mg) by mouth or Feeding Tube every evening. 30 tablet 0    metoprolol succinate ER (TOPROL XL) 25 MG 24 hr tablet Take 1 tablet (25 mg) by mouth daily. 30 tablet 3       ALLERGIES   No Known Allergies    PAST MEDICAL HISTORY:  Past Medical History:   Diagnosis Date    Anxiety     Aortic valve mass     Atrial fibrillation (H)     Cerebrovascular accident (H)     Migraine        PAST SURGICAL HISTORY:  Past Surgical History:   Procedure Laterality Date    REPAIR VALVE AORTIC MINIMALLY INVASIVE N/A 1/20/2025    Procedure: MINIMALLY INVASIVE EXCISION OF AORTIC VALVE MASS  ON PUMP/TRANSESOPHAGEAL ECHOCARDIOGRAM PER ANESTHESIA;  Surgeon: Marlena Anne MD;  Location:  OR    TRANSESOPHAGEAL ECHOCARDIOGRAM INTRAOPERATIVE N/A 12/11/2024    Procedure: Transesophageal echocardiogram intraoperative;  Surgeon: GENERIC ANESTHESIA PROVIDER;  Location:  OR       FAMILY HISTORY:  No family history on file.    SOCIAL HISTORY:  Social History     Socioeconomic History    Marital status: Single     Spouse name: None    Number of children: None    Years of education: None    Highest education level: None   Tobacco Use    Smoking status: Former     Types: Cigarettes    Smokeless tobacco: Never   Substance and Sexual Activity    Alcohol use: Not Currently    Drug use: Never  "    Social Drivers of Health     Financial Resource Strain: Low Risk  (1/20/2025)    Financial Resource Strain     Within the past 12 months, have you or your family members you live with been unable to get utilities (heat, electricity) when it was really needed?: No   Food Insecurity: Low Risk  (1/20/2025)    Food Insecurity     Within the past 12 months, did you worry that your food would run out before you got money to buy more?: No     Within the past 12 months, did the food you bought just not last and you didn t have money to get more?: No   Transportation Needs: Low Risk  (1/20/2025)    Transportation Needs     Within the past 12 months, has lack of transportation kept you from medical appointments, getting your medicines, non-medical meetings or appointments, work, or from getting things that you need?: No    Received from [facility]    Social Connections   Interpersonal Safety: Low Risk  (1/20/2025)    Interpersonal Safety     Do you feel physically and emotionally safe where you currently live?: Yes     Within the past 12 months, have you been hit, slapped, kicked or otherwise physically hurt by someone?: No     Within the past 12 months, have you been humiliated or emotionally abused in other ways by your partner or ex-partner?: No   Housing Stability: Low Risk  (1/20/2025)    Housing Stability     Do you have housing? : Yes     Are you worried about losing your housing?: No       Review of Systems:  Skin:        Eyes:       ENT:       Respiratory:  Negative shortness of breath  Cardiovascular:  Negative, palpitations, chest pain, edema, fatigue, dizziness Positive for, lightheadedness  Gastroenterology:      Genitourinary:       Musculoskeletal:       Neurologic:       Psychiatric:       Heme/Lymph/Imm:       Endocrine:         Physical Exam:  Vitals: /81 (BP Location: Right arm, Patient Position: Sitting)   Pulse 76   Ht 1.803 m (5' 11\")   Wt 84.9 kg (187 lb " 1.6 oz)   SpO2 99%   BMI 26.10 kg/m      Constitutional:  cooperative, in no acute distress        Skin:  warm and dry to the touch          Head:  normocephalic        Eyes:  pupils equal and round        Lymph:      ENT:  dentition good        Neck:  no carotid bruit        Respiratory:  clear to auscultation, normal symmetry         Cardiac: regular rhythm, no murmurs, gallops or rubs detected                pulses full and equal                                        GI:  abdomen soft        Extremities and Muscular Skeletal:  no deformities, clubbing, cyanosis, erythema observed, no edema              Neurological:  no gross motor deficits, affect appropriate        Psych:  Alert and Oriented x 3        Recent Lab Results:  LIPID RESULTS:  Lab Results   Component Value Date    CHOL 217 (H) 12/09/2024    HDL 40 12/09/2024     (H) 12/09/2024    TRIG 162 (H) 12/09/2024       LIVER ENZYME RESULTS:  Lab Results   Component Value Date    AST 33 01/21/2025    ALT 30 01/21/2025       CBC RESULTS:  Lab Results   Component Value Date    WBC 11.5 (H) 01/23/2025    WBC 5.3 07/19/2005    RBC 4.46 01/23/2025    RBC 4.75 07/19/2005    HGB 13.5 01/23/2025    HGB 14.6 07/19/2005    HCT 39.0 (L) 01/23/2025    HCT 41.3 07/19/2005    MCV 87 01/23/2025    MCV 87 07/19/2005    MCH 30.3 01/23/2025    MCH 30.7 07/19/2005    MCHC 34.6 01/23/2025    MCHC 35.3 07/19/2005    RDW 12.4 01/23/2025    RDW 14.0 07/19/2005     01/23/2025     07/19/2005       BMP RESULTS:  Lab Results   Component Value Date     01/23/2025     07/19/2005    POTASSIUM 3.8 01/23/2025    POTASSIUM 4.5 01/20/2025    POTASSIUM 3.1 (L) 07/19/2005    CHLORIDE 110 (H) 01/23/2025    CHLORIDE 103 07/19/2005    CO2 26 01/23/2025    CO2 23 07/19/2005    ANIONGAP 8 01/23/2025    ANIONGAP 9 07/19/2005     (H) 01/23/2025     (H) 01/23/2025     (H) 07/19/2005    BUN 9.6 01/23/2025    BUN 5 07/19/2005    CR 1.01 01/23/2025     CR 1.00 07/19/2005    GFRESTIMATED >90 01/23/2025    GFRESTIMATED >80 07/19/2005    GFRESTBLACK >80 07/19/2005    SOFI 8.9 01/23/2025    SOFI 8.9 07/19/2005        A1C RESULTS:  Lab Results   Component Value Date    A1C 5.7 (H) 01/20/2025       INR RESULTS:  Lab Results   Component Value Date    INR 1.30 (H) 01/20/2025    INR 1.46 (H) 01/20/2025           CC  Referred Self, MD  No address on file

## 2025-05-19 ENCOUNTER — OFFICE VISIT (OUTPATIENT)
Dept: NEUROLOGY | Facility: CLINIC | Age: 50
End: 2025-05-19
Payer: COMMERCIAL

## 2025-05-19 ENCOUNTER — PRE VISIT (OUTPATIENT)
Dept: NEUROLOGY | Facility: CLINIC | Age: 50
End: 2025-05-19

## 2025-05-19 VITALS — DIASTOLIC BLOOD PRESSURE: 78 MMHG | HEART RATE: 85 BPM | OXYGEN SATURATION: 96 % | SYSTOLIC BLOOD PRESSURE: 115 MMHG

## 2025-05-19 DIAGNOSIS — F10.11 HISTORY OF ALCOHOL ABUSE: ICD-10-CM

## 2025-05-19 DIAGNOSIS — I10 ESSENTIAL HYPERTENSION: ICD-10-CM

## 2025-05-19 DIAGNOSIS — Z87.891 EX-SMOKER: ICD-10-CM

## 2025-05-19 DIAGNOSIS — I69.30 LATE EFFECT OF ISCHEMIC CEREBRAL STROKE: Primary | ICD-10-CM

## 2025-05-19 DIAGNOSIS — E78.5 DYSLIPIDEMIA: ICD-10-CM

## 2025-05-19 DIAGNOSIS — I51.1 RUPTURED CHORDAE TENDINEAE (H): ICD-10-CM

## 2025-05-19 DIAGNOSIS — N28.0 RENAL INFARCT: ICD-10-CM

## 2025-05-19 PROCEDURE — 99215 OFFICE O/P EST HI 40 MIN: CPT | Performed by: PSYCHIATRY & NEUROLOGY

## 2025-05-19 PROCEDURE — 3074F SYST BP LT 130 MM HG: CPT | Performed by: PSYCHIATRY & NEUROLOGY

## 2025-05-19 PROCEDURE — 1126F AMNT PAIN NOTED NONE PRSNT: CPT | Performed by: PSYCHIATRY & NEUROLOGY

## 2025-05-19 PROCEDURE — 3078F DIAST BP <80 MM HG: CPT | Performed by: PSYCHIATRY & NEUROLOGY

## 2025-05-19 ASSESSMENT — PAIN SCALES - GENERAL: PAINLEVEL_OUTOF10: NO PAIN (0)

## 2025-05-19 NOTE — PROGRESS NOTES
_____________________________________________________________    [facility]    at [facility] [phone]  _____________________________________________________________      Chief Complaint: stroke follow up       History of Present Illness: Mundo Kern is a 50 year old male with hypertension, dyslipidemia, prior smoking and alcohol abuse who had stroke in Dec 2024 for which he was admitted to [facility]. This was when I met him for the first time. The stroke in Dec 2024 involved the R SCA territory and was the patient's 3rd stroke. In July 2023 had multifocal L MCA territory stroke, April 2024 found to have subacute R PCA territory. He had multiple vascular imaging studies, CTAs and MRAs, and had no significant stenosis, occlusion, or vasculopathy. CT chest/abd/pelvis showed no occult malignancy. It did show a wedge shaped hypodensity of inf poles of both kidneys which was concerning for renal infarcts. He had had TTE in July 2023 which was unremarkable. He also had a MARIA TERESA in July 2023 but the study was incomplete because the patient could not tolerate it. 30-d cardiac monitoring in July/August 2023 showed no intermittent Arrhythmia. 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 The DD was for fibroelastoma vs degenerative strand. Patient was seen by Dr Marx of cardiology who reviewed the incomplete MARIA TERESA that had been done the prior year and Dr Marx was able to identify the same mass lesion and it appeared to have the same size. Given the multiple cerebral and systemic emboli, we suspected that this lesion was the culprit. The patient did not have systemic manifestations of infective endocarditis and the timeframe of recurrent stroke was 17 months without obvious damage to the  valves.      We recommended surgical excision of that lesion. This was done by Dr Anne of cardiothoracic surgery in Jan 2025. The surgical report noted a single long filamentous structure that was originating from the base the anterior mitral valve leaflet along the ventricular side, which was completely excised at its attachment and sent to pathology.  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     The pathology was reviewed by  pathologist which reported a collagenized, endothelial-lined connective tissue excerscences with myxoid change. The slides were subsequently sent to [facility] who noted that histomorphological similarity to a tendinous cord but noted that a ruptured site was not identified. Lamble's excerscences was thought unlikely and the specimen lacked the complex secondary and tertiary branching typically seen in papillary fibroelastoma. There were no features suggestive of myxoma, organized thrombus, or vegetation. The [place] pathologist favored the diagnosis of false tendon. 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 .     Patient is here today for follow up accompanied by his mother.     Patient feels great! He is fully functional.   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   In the [place]. Lifting. Full time. No restrictions.   Driving. No difficulty now.   Lives with girlfriend and mother.   Needs reminders not to miss.   Sleep is fine.   Memory has better than last year.   Quit smoking and alcohol since last stroke.   Taking his medications consistently.   No excessive bleeding or easy bruising on ASA.     mRS 0      Diagnosis:  Problem List Items Addressed This Visit     None  Visit Diagnoses         Late effect of ischemic cerebral stroke    -  Primary      Essential hypertension          Dyslipidemia          Ruptured chordae tendineae (H)          Ex-smoker          History of alcohol abuse          Renal infarct                Impression & Plan:     As discussed with the patient and his mother:     Diagnosis:   Recurrent stroke and 2 small lesions of the kidneys that appear to be stroke-like damage to the kidney. These probably came from blood clot formation in the heart. We found that you have a mobile mass attached to your mitral valve. We think this is a torn tendon (ruptured anomalous chordae tendinae). Now you have this lesion removed from your heart so your risk of stroke is less.   You have made good recovery from your stroke and are now back to your normal functioning.   You have risk factors for stroke, namely high blood pressure and high cholesterol.   You used to smoke and consume alcohol and you have been able to quit both -- CONGRATULATIONS AND RESPECT!     Plan:   Decrease your aspirin from 325 mg to 81 mg daily.   Go to the lab and check your cholesterol level. We may be able to reduce the dose of your cholesterol lowering medication. Your target LDL (bad cholesterol) is less than 100, and ideally less than 70.   Monitor your blood pressure at home. Your target blood pressure is less than 130/80.   Eat healthy mediterranean diet.   Regular aerobic exercise. Your target is 150 minutes per week.   I will reach out to cardiology to ensure that we have an order for the follow up MARIA TERESA.   I will share your information with our stroke support group and the American Heart Association here in the [place].   Thank you for agreeing to have your case published. I will send you a draft to review before we send for publication.     Follow up in 6 months. Call earlier if you need to.     Stroke Education provided.  He will call us with any questions.  For any acute  "neurologic deficits he was advised to  go directly to the hospital rather than call the clinic.    Kristan Redman MD, Msc, ANTONIETA, FAAN   of Neurology  [facility]     05/19/2025 3:50 PM  To page me or covering stroke neurology team member, click here: AMCOM  Choose \"On Call\" tab at top, then search dropdown box for \"Neurology Adult\" & press Enter, look for Neuro ICU/Stroke    ___________________________________________________________________    Current Medications  Current Outpatient Medications   Medication Sig Dispense Refill    acetaminophen (TYLENOL) 325 MG tablet Take 2 tablets (650 mg) by mouth every 6 hours as needed for mild pain. 60 tablet 0    aspirin (ASA) 325 MG EC tablet Take 1 tablet (325 mg) by mouth daily. 30 tablet 0    atorvastatin (LIPITOR) 40 MG tablet Take 1 tablet (40 mg) by mouth or Feeding Tube every evening. 30 tablet 0    metoprolol succinate ER (TOPROL XL) 25 MG 24 hr tablet Take 1 tablet (25 mg) by mouth daily. 30 tablet 3     No current facility-administered medications for this visit.       Past Medical History  Past Medical History:   Diagnosis Date    Anxiety     Aortic valve mass     Atrial fibrillation (H)     Cerebrovascular accident (H)     Migraine        Social History  Social History     Tobacco Use    Smoking status: Former     Types: Cigarettes    Smokeless tobacco: Never   Substance Use Topics    Alcohol use: Not Currently    Drug use: Never       Family History  No family history on file.    Physical Exam    /78 (BP Location: Left arm, Patient Position: Sitting, Cuff Size: Adult Large)   Pulse 85   SpO2 96%     General Exam:  GEN:  Awake, NAD  HEAD:  Atraumatic/Normocephalic  EYES:  Non-icterus, no conjunctivitis  EARS:  No otic discharge  NOSE:  Patent nares, no discharge  THROAT:  MMM, No oral lesions  PULM:  Breathing comfortably on room air, no tachypnea, not using accessory muscles of respiration  MSK:  No peripheral " edema  SKIN:  No dermatitis, no apparent rash     NEUROLOGICAL EXAM:  Mental State:   Awake, Oriented to time, place, and persons. Attentive. Reactive affect.   LANGUAGE/SPEECH:    No dysphasia or paraphasic errors.    CN:   I:  Deferred  II:  VFF  III/IV/VI: EOMI, no nystagmus  V:  V1-V3 intact  VII:  Face was symmetric bilaterally  VIII:  Hearing was intact to conversational voice  IX/X:  Palatal raise was intact bilaterally  XI:  Shoulder shrug was strong bilaterally; Pt is able to rotate head left and right against resistance  XII:  Tongue midline; able to move it left and right without any difficulties  MOTOR:  State and Bulk:  No atrophy, hypertrophy, or fasciculations.   Tone:  Normal in bilateral UE and LE  Dexterity and speed: Intact  Power: no focal weakness, 5/5 in all muscle groups  Sensory:   No hemihypesthesia  Coordination:  Intact finger to nose and heel to shin bilaterally; no dysmetria or decomposition of movement  Involuntary Movement:    None observed  Gait:    Normal gait without assistive devices     NIHSS 0       Labs:    Recent Labs   Lab Test 01/20/25  1348 01/20/25  1225 12/13/24  0851   INR 1.30* 1.46* 1.03        Recent Labs   Lab Test 12/09/24  1705 07/22/23  1914   CHOL 217* 195   HDL 40 41   * 103*   TRIG 162* 256*       Recent Labs   Lab Test 01/20/25  1348 12/09/24  1705 07/22/23  1914   A1C 5.7* 5.3 5.1       Billing disclosure:    40 min spent on the date of the encounter in chart review, patient visit, review of tests, documentation and/or discussion with other providers about the issues documented above.

## 2025-05-19 NOTE — LETTER
5/19/2025      Mundo Kern  [address]  [facility] [address]      Dear Colleague,    Thank you for referring your patient, Mundo Kern, to the [facility]. Please see a copy of my visit note below.      _____________________________________________________________    [facility]    at [facility] [phone]  _____________________________________________________________      Chief Complaint: stroke follow up       History of Present Illness: Mundo Kern is a 50 year old male with hypertension, dyslipidemia, prior smoking and alcohol abuse who had stroke in Dec 2024 for which he was admitted to [facility]. This was when I met him for the first time. The stroke in Dec 2024 involved the R SCA territory and was the patient's 3rd stroke. In July 2023 had multifocal L MCA territory stroke, April 2024 found to have subacute R PCA territory. He had multiple vascular imaging studies, CTAs and MRAs, and had no significant stenosis, occlusion, or vasculopathy. CT chest/abd/pelvis showed no occult malignancy. It did show a wedge shaped hypodensity of inf poles of both kidneys which was concerning for renal infarcts. He had had TTE in July 2023 which was unremarkable. He also had a MARIA TERESA in July 2023 but the study was incomplete because the patient could not tolerate it. 30-d cardiac monitoring in July/August 2023 showed no intermittent Arrhythmia. 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 The DD was for fibroelastoma vs degenerative strand. Patient was seen by Dr Marx of cardiology who reviewed the incomplete MARIA TERESA that had been done the prior year and Dr Heifetz was able to identify the same mass lesion and it appeared to have the same size.  Given the multiple cerebral and systemic emboli, we suspected that this lesion was the culprit. The patient did not have systemic manifestations of infective endocarditis and the timeframe of recurrent stroke was 17 months without obvious damage to the valves.      We recommended surgical excision of that lesion. This was done by Dr Anne of cardiothoracic surgery in Jan 2025. The surgical report noted a single long filamentous structure that was originating from the base the anterior mitral valve leaflet along the ventricular side, which was completely excised at its attachment and sent to pathology.  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     The pathology was reviewed by  pathologist which reported a collagenized, endothelial-lined connective tissue excerscences with myxoid change. The slides were subsequently sent to [facility] who noted that histomorphological similarity to a tendinous cord but noted that a ruptured site was not identified. Lamble's excerscences was thought unlikely and the specimen lacked the complex secondary and tertiary branching typically seen in papillary fibroelastoma. There were no features suggestive of myxoma, organized thrombus, or vegetation. The [place] pathologist favored the diagnosis of false tendon. 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 .     Patient is here today for follow up accompanied by his mother.     Patient feels great! He is fully functional.   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   In the [employer]. Lifting. Full time. No restrictions.   Driving. No difficulty now.   Lives with girlfriend and mother.   Needs reminders not to miss.   Sleep is  fine.   Memory has better than last year.   Quit smoking and alcohol since last stroke.   Taking his medications consistently.   No excessive bleeding or easy bruising on ASA.     mRS 0      Diagnosis:  Problem List Items Addressed This Visit    None  Visit Diagnoses         Late effect of ischemic cerebral stroke    -  Primary      Essential hypertension          Dyslipidemia          Ruptured chordae tendineae (H)          Ex-smoker          History of alcohol abuse          Renal infarct                Impression & Plan:     As discussed with the patient and his mother:     Diagnosis:   Recurrent stroke and 2 small lesions of the kidneys that appear to be stroke-like damage to the kidney. These probably came from blood clot formation in the heart. We found that you have a mobile mass attached to your mitral valve. We think this is a torn tendon (ruptured anomalous chordae tendinae). Now you have this lesion removed from your heart so your risk of stroke is less.   You have made good recovery from your stroke and are now back to your normal functioning.   You have risk factors for stroke, namely high blood pressure and high cholesterol.   You used to smoke and consume alcohol and you have been able to quit both -- CONGRATULATIONS AND RESPECT!     Plan:   Decrease your aspirin from 325 mg to 81 mg daily.   Go to the lab and check your cholesterol level. We may be able to reduce the dose of your cholesterol lowering medication. Your target LDL (bad cholesterol) is less than 100, and ideally less than 70.   Monitor your blood pressure at home. Your target blood pressure is less than 130/80.   Eat healthy mediterranean diet.   Regular aerobic exercise. Your target is 150 minutes per week.   I will reach out to cardiology to ensure that we have an order for the follow up MARIA TERESA.   I will share your information with our stroke support group and the American Heart Association here in the [place].   Thank you for  "agreeing to have your case published. I will send you a draft to review before we send for publication.     Follow up in 6 months. Call earlier if you need to.     Stroke Education provided.  He will call us with any questions.  For any acute neurologic deficits he was advised to  go directly to the hospital rather than call the clinic.    Kristan Redman MD, Msc, [facility]   of Neurology  [facility]     05/19/2025 3:50 PM  To page me or covering stroke neurology team member, click here: AMCOM  Choose \"On Call\" tab at top, then search dropdown box for \"Neurology Adult\" & press Enter, look for Neuro ICU/Stroke    ___________________________________________________________________    Current Medications  Current Outpatient Medications   Medication Sig Dispense Refill     acetaminophen (TYLENOL) 325 MG tablet Take 2 tablets (650 mg) by mouth every 6 hours as needed for mild pain. 60 tablet 0     aspirin (ASA) 325 MG EC tablet Take 1 tablet (325 mg) by mouth daily. 30 tablet 0     atorvastatin (LIPITOR) 40 MG tablet Take 1 tablet (40 mg) by mouth or Feeding Tube every evening. 30 tablet 0     metoprolol succinate ER (TOPROL XL) 25 MG 24 hr tablet Take 1 tablet (25 mg) by mouth daily. 30 tablet 3     No current facility-administered medications for this visit.       Past Medical History  Past Medical History:   Diagnosis Date     Anxiety      Aortic valve mass      Atrial fibrillation (H)      Cerebrovascular accident (H)      Migraine        Social History  Social History     Tobacco Use     Smoking status: Former     Types: Cigarettes     Smokeless tobacco: Never   Substance Use Topics     Alcohol use: Not Currently     Drug use: Never       Family History  No family history on file.    Physical Exam    /78 (BP Location: Left arm, Patient Position: Sitting, Cuff Size: Adult Large)   Pulse 85   SpO2 96%     General Exam:  GEN:  Awake, NAD  HEAD:  " Atraumatic/Normocephalic  EYES:  Non-icterus, no conjunctivitis  EARS:  No otic discharge  NOSE:  Patent nares, no discharge  THROAT:  MMM, No oral lesions  PULM:  Breathing comfortably on room air, no tachypnea, not using accessory muscles of respiration  MSK:  No peripheral edema  SKIN:  No dermatitis, no apparent rash     NEUROLOGICAL EXAM:  Mental State:   Awake, Oriented to time, place, and persons. Attentive. Reactive affect.   LANGUAGE/SPEECH:    No dysphasia or paraphasic errors.    CN:   I:  Deferred  II:  VFF  III/IV/VI: EOMI, no nystagmus  V:  V1-V3 intact  VII:  Face was symmetric bilaterally  VIII:  Hearing was intact to conversational voice  IX/X:  Palatal raise was intact bilaterally  XI:  Shoulder shrug was strong bilaterally; Pt is able to rotate head left and right against resistance  XII:  Tongue midline; able to move it left and right without any difficulties  MOTOR:  State and Bulk:  No atrophy, hypertrophy, or fasciculations.   Tone:  Normal in bilateral UE and LE  Dexterity and speed: Intact  Power: no focal weakness, 5/5 in all muscle groups  Sensory:   No hemihypesthesia  Coordination:  Intact finger to nose and heel to shin bilaterally; no dysmetria or decomposition of movement  Involuntary Movement:    None observed  Gait:    Normal gait without assistive devices     NIHSS 0       Labs:    Recent Labs   Lab Test 01/20/25  1348 01/20/25  1225 12/13/24  0851   INR 1.30* 1.46* 1.03        Recent Labs   Lab Test 12/09/24  1705 07/22/23  1914   CHOL 217* 195   HDL 40 41   * 103*   TRIG 162* 256*       Recent Labs   Lab Test 01/20/25  1348 12/09/24  1705 07/22/23  1914   A1C 5.7* 5.3 5.1       Billing disclosure:    40 min spent on the date of the encounter in chart review, patient visit, review of tests, documentation and/or discussion with other providers about the issues documented above.         Again, thank you for allowing me to participate in the care of your patient.         Sincerely,        Kristan Redman MD    Electronically signed

## 2025-05-19 NOTE — NURSING NOTE
"Mundo Kern is a 50 year old male who presents for:  Chief Complaint   Patient presents with    RECHECK        Initial Vitals:  /78 (BP Location: Left arm, Patient Position: Sitting, Cuff Size: Adult Large)   Pulse 85   SpO2 96%  Estimated body mass index is 26.1 kg/m  as calculated from the following:    Height as of 4/16/25: 1.803 m (5' 11\").    Weight as of 4/16/25: 84.9 kg (187 lb 1.6 oz). BP completed using cuff size: regular  No Pain (0)    Maverick Izquierdo    "

## 2025-05-28 ENCOUNTER — LAB (OUTPATIENT)
Dept: LAB | Facility: CLINIC | Age: 50
End: 2025-05-28
Payer: COMMERCIAL

## 2025-05-28 DIAGNOSIS — E78.5 DYSLIPIDEMIA: ICD-10-CM

## 2025-05-28 LAB
CHOLEST SERPL-MCNC: 115 MG/DL
FASTING STATUS PATIENT QL REPORTED: NO
HDLC SERPL-MCNC: 35 MG/DL
LDLC SERPL CALC-MCNC: 39 MG/DL
NONHDLC SERPL-MCNC: 80 MG/DL
TRIGL SERPL-MCNC: 203 MG/DL

## 2025-05-28 PROCEDURE — 36415 COLL VENOUS BLD VENIPUNCTURE: CPT

## 2025-05-28 PROCEDURE — 80061 LIPID PANEL: CPT

## 2025-05-29 ENCOUNTER — RESULTS FOLLOW-UP (OUTPATIENT)
Dept: NEUROLOGY | Facility: CLINIC | Age: 50
End: 2025-05-29
Payer: COMMERCIAL

## 2025-05-29 DIAGNOSIS — E78.5 DYSLIPIDEMIA: Primary | ICD-10-CM

## 2025-05-29 RX ORDER — ATORVASTATIN CALCIUM 20 MG/1
20 TABLET, FILM COATED ORAL DAILY
Qty: 90 TABLET | Refills: 3 | Status: SHIPPED | OUTPATIENT
Start: 2025-05-29

## 2025-05-29 NOTE — LETTER
Mayuri 3, 2025      Mundo Kern  [address] [facility] [address]        Dear ,    We are writing to inform you of your test results.    {results letter list:306313}    Resulted Orders   Lipid panel reflex to direct LDL Fasting   Result Value Ref Range    Cholesterol 115 <200 mg/dL    Triglycerides 203 (H) <150 mg/dL    Direct Measure HDL 35 (L) >=40 mg/dL    LDL Cholesterol Calculated 39 <100 mg/dL    Non HDL Cholesterol 80 <130 mg/dL    Patient Fasting > 8hrs? No     Narrative    Cholesterol  Desirable: < 200 mg/dL  Borderline High: 200 - 239 mg/dL  High: >= 240 mg/dL    Triglycerides  Normal: < 150 mg/dL  Borderline High: 150 - 199 mg/dL  High: 200-499 mg/dL  Very High: >= 500 mg/dL    Direct Measure HDL  Female: >= 50 mg/dL   Male: >= 40 mg/dL    LDL Cholesterol  Desirable: < 100 mg/dL  Above Desirable: 100 - 129 mg/dL   Borderline High: 130 - 159 mg/dL   High:  160 - 189 mg/dL   Very High: >= 190 mg/dL    Non HDL Cholesterol  Desirable: < 130 mg/dL  Above Desirable: 130 - 159 mg/dL  Borderline High: 160 - 189 mg/dL  High: 190 - 219 mg/dL  Very High: >= 220 mg/dL       If you have any questions or concerns, please call the clinic at the number listed above.       Sincerely,          Electronically signed

## 2025-05-30 NOTE — TELEPHONE ENCOUNTER
Stroke RN Care Coordination - Unable to Reach / Voicemail Note     Stroke RN Care Coordinator Outreach:  Results (Lipid Panel) and Medication Change (Decrease Atorvastatin)       Outreach attempted x 1.      Left message on patient's voicemail with call back information and requested return call.    Stroke RN Care Coordinator will try to reach patient again in 1-2 business days.      Miranda SHAH, RN, SCRN  RN Stroke Neurology Care Coordinator  [facility] Neuroscience Service Line     unknown

## 2025-06-03 NOTE — TELEPHONE ENCOUNTER
Stroke RN Care Coordination - Unable to Reach / Voicemail Note     Stroke RN Care Coordinator Outreach:  Results (Lipid Panel) and Medication Change (Decrease Atorvastatin)       Outreach attempted x 2.      Left message on patient's voicemail with call back information and requested return call.    Stroke RN Care Coordinator will send letter regarding results and recommendations via mail. Stroke RN Care Coordinator will try to reach patient again in 3-5 business days.      Miranda SHAH, RN, SCRN  RN Stroke Neurology Care Coordinator  [facility] Neuroscience Service Line

## 2025-06-03 NOTE — TELEPHONE ENCOUNTER
Received return call from pt and informed him of results and recommendation to reduce lipitor from 40mg to 20mg. Confirmed that rx was sent to his preferred pharmacy. He did not have any other questions or concerns.       Miranda SHAH, RN, SCRN  Stroke/Neurovascular Clinic RN  [facility] Neuroscience Service Line

## 2025-08-09 ENCOUNTER — OFFICE VISIT (OUTPATIENT)
Dept: URGENT CARE | Facility: URGENT CARE | Age: 50
End: 2025-08-09
Payer: COMMERCIAL

## 2025-08-09 VITALS
DIASTOLIC BLOOD PRESSURE: 87 MMHG | SYSTOLIC BLOOD PRESSURE: 130 MMHG | RESPIRATION RATE: 14 BRPM | HEIGHT: 71 IN | OXYGEN SATURATION: 97 % | TEMPERATURE: 99.5 F | BODY MASS INDEX: 26.1 KG/M2 | WEIGHT: 186.4 LBS | HEART RATE: 91 BPM

## 2025-08-09 DIAGNOSIS — R07.0 THROAT PAIN: Primary | ICD-10-CM

## 2025-08-09 DIAGNOSIS — R50.9 LOW GRADE FEVER: ICD-10-CM

## 2025-08-09 DIAGNOSIS — I63.9 ACUTE ISCHEMIC STROKE (H): ICD-10-CM

## 2025-08-09 PROBLEM — I48.91 ATRIAL FIBRILLATION, UNSPECIFIED TYPE (H): Status: ACTIVE | Noted: 2024-04-04

## 2025-08-09 LAB
DEPRECATED S PYO AG THROAT QL EIA: NEGATIVE
S PYO DNA THROAT QL NAA+PROBE: NOT DETECTED

## 2025-08-09 PROCEDURE — 3079F DIAST BP 80-89 MM HG: CPT | Performed by: NURSE PRACTITIONER

## 2025-08-09 PROCEDURE — 99203 OFFICE O/P NEW LOW 30 MIN: CPT | Performed by: NURSE PRACTITIONER

## 2025-08-09 PROCEDURE — 87635 SARS-COV-2 COVID-19 AMP PRB: CPT | Performed by: NURSE PRACTITIONER

## 2025-08-09 PROCEDURE — 3075F SYST BP GE 130 - 139MM HG: CPT | Performed by: NURSE PRACTITIONER

## 2025-08-09 PROCEDURE — 87651 STREP A DNA AMP PROBE: CPT | Performed by: NURSE PRACTITIONER

## 2025-08-10 LAB — SARS-COV-2 RNA RESP QL NAA+PROBE: POSITIVE

## (undated) DEVICE — CANNULA PERFUSION FEM/ART/JUG 19FR 96570-119

## (undated) DEVICE — SU SILK 1 TIE 10X30" SA87G

## (undated) DEVICE — RESERVOIR CELL SAVING BLOOD COLLECTION EL2120

## (undated) DEVICE — SOMASENSOR CEREBRAL OXIMETER ADULT SAFB-SM

## (undated) DEVICE — SU PLEDGET SOFT TFE 3/8"X3/26"X1/16" PCP40

## (undated) DEVICE — SU DEVICE COR-KNOT MINI 4X14MM 031350

## (undated) DEVICE — SU VICRYL 0 CTX 36" J370H

## (undated) DEVICE — SUCTION TIP YANKAUER STR K87

## (undated) DEVICE — SPONGE PEANUT

## (undated) DEVICE — CANNULA 25FR MULIT-STAGE 96880-25

## (undated) DEVICE — SU VICRYL+ 3-0 FS1 27IN UND VCP442H

## (undated) DEVICE — LINEN LEG ROLL 5489

## (undated) DEVICE — SOL WATER IRRIG 1000ML BOTTLE 2F7114

## (undated) DEVICE — SU PROLENE 3-0 SHDA 36" 8522H

## (undated) DEVICE — INSERT INTRAC 66MM XT CYGNET N-10174

## (undated) DEVICE — PUMP CP37 QUANTUM CENTRIFUGAL BLOOD CP37V-V0

## (undated) DEVICE — LINEN TOWEL PACK X30 5481

## (undated) DEVICE — VALVE VRV 9156R2

## (undated) DEVICE — SU PROLENE 4-0 SHDA 36" 8521H

## (undated) DEVICE — ENDO DISSECTOR BLUNT 10MM CHERRY BCD10

## (undated) DEVICE — BLADE KNIFE SURG 20 371120

## (undated) DEVICE — SU STEEL 6 CCS 4X18" M654G

## (undated) DEVICE — DRAPE COVER ROBOTIC ARM (UNITRAC RETRACTOR) JG901

## (undated) DEVICE — CANNULA PERFUSION 14FRX16" LEFT 12016

## (undated) DEVICE — SU ETHIBOND 5 V-37 4X30" MB66G

## (undated) DEVICE — SU ETHIBOND 0 CT-1 CR 8X18" CX21D

## (undated) DEVICE — SUCTION CANISTER MEDIVAC LINER 3000ML W/LID 65651-530

## (undated) DEVICE — SU ETHIBOND 2-0 SHDA 30" X563H

## (undated) DEVICE — DRAPE NEW SLUSH/WARMER HUSHSLUSH 2.0 ESD340 ESD340

## (undated) DEVICE — PREP CHLORAPREP W/ORANGE TINT 10.5ML 930715

## (undated) DEVICE — TOURNIQUET VASCULAR

## (undated) DEVICE — ANTIFOG SOLUTION SEE SHARP 150M TROCAR SWABS 30978 (COI)

## (undated) DEVICE — DEVICE ASSEMBLY SUCTION/ANTI COAG BTC93

## (undated) DEVICE — RX SURGIFLO HEMOSTATIC MATRIX W/THROMBIN 8ML 2994

## (undated) DEVICE — SU ETHIBOND 3-0 BBDA 36" X588H

## (undated) DEVICE — PACK SURGICAL PROCEDURE CUSTOM 1/2IN X 3/8IN BB11W18R1

## (undated) DEVICE — Device

## (undated) DEVICE — KIT VASC DILATOR ESTECH 200-120

## (undated) DEVICE — DEVICE SUTURE PASSER 14GA WECK EFX EFXSP2

## (undated) DEVICE — SUCTION CATH AIRLIFE TRI-FLO W/CONTROL PORT 14FR  T60C

## (undated) DEVICE — SOL NACL 0.9% IRRIG 1000ML BOTTLE 2F7124

## (undated) DEVICE — CONNECTOR BLAKE DRAIN SGL BCC1

## (undated) DEVICE — SU VICRYL 2-0 CT-1 27" UND J259H

## (undated) DEVICE — SUCTION DRY CHEST DRAIN OASIS 3600-100

## (undated) DEVICE — SU PROLENE 5-0 RB-2DA 30" 8710H

## (undated) DEVICE — CABLE MYO/LEAD PACING WHITE DISP 019-530

## (undated) DEVICE — CONNECTOR CARDIO BLAKE DRAIN BCC2

## (undated) DEVICE — SURGICEL HEMOSTAT 2X14" 1951

## (undated) DEVICE — SU PROLENE 4-0 RB-1DA 36" 8557H

## (undated) DEVICE — COVER TABLE POLY 65X90" 8186

## (undated) DEVICE — LINEN TOWEL PACK X5 5464

## (undated) DEVICE — KIT WASH CELL SAVING ATL2001

## (undated) DEVICE — LINEN TOWEL PACK X6 WHITE 5487

## (undated) DEVICE — OXYGENATOR PERFUSION AFFINITY FUSION BB841

## (undated) DEVICE — PROTECTOR ARM ONE-STEP TRENDELENBURG 40418

## (undated) DEVICE — LEAD PACER MYOCARDIAL BIPOLAR TEMPORARY 53CM 6495F

## (undated) DEVICE — DRAIN SUMP INTRACARDIAC 20FR 12" POOL TIP 12112

## (undated) DEVICE — SU MONOCRYL 4-0 PS-1 27'  UND Y935H

## (undated) DEVICE — PACK OPEN HEART PV12OH524

## (undated) RX ORDER — FENTANYL CITRATE 50 UG/ML
INJECTION, SOLUTION INTRAMUSCULAR; INTRAVENOUS
Status: DISPENSED
Start: 2024-12-11

## (undated) RX ORDER — GLYCOPYRROLATE 0.2 MG/ML
INJECTION, SOLUTION INTRAMUSCULAR; INTRAVENOUS
Status: DISPENSED
Start: 2023-07-25

## (undated) RX ORDER — SODIUM CHLORIDE, SODIUM GLUCONATE, SODIUM ACETATE, POTASSIUM CHLORIDE AND MAGNESIUM CHLORIDE 526; 502; 368; 37; 30 MG/100ML; MG/100ML; MG/100ML; MG/100ML; MG/100ML
INJECTION, SOLUTION INTRAVENOUS
Status: DISPENSED

## (undated) RX ORDER — VECURONIUM BROMIDE 1 MG/ML
INJECTION, POWDER, LYOPHILIZED, FOR SOLUTION INTRAVENOUS
Status: DISPENSED
Start: 2025-01-20

## (undated) RX ORDER — HEPARIN SODIUM 1000 [USP'U]/ML
INJECTION, SOLUTION INTRAVENOUS; SUBCUTANEOUS
Status: DISPENSED
Start: 2025-01-20

## (undated) RX ORDER — PHENYLEPHRINE HCL IN 0.9% NACL 1 MG/10 ML
SYRINGE (ML) INTRAVENOUS
Status: DISPENSED

## (undated) RX ORDER — CHLORHEXIDINE GLUCONATE ORAL RINSE 1.2 MG/ML
SOLUTION DENTAL
Status: DISPENSED
Start: 2025-01-20

## (undated) RX ORDER — METOPROLOL TARTRATE 50 MG
TABLET ORAL
Status: DISPENSED
Start: 2024-12-13

## (undated) RX ORDER — VANCOMYCIN HYDROCHLORIDE 500 MG/10ML
INJECTION, POWDER, LYOPHILIZED, FOR SOLUTION INTRAVENOUS
Status: DISPENSED
Start: 2025-01-20

## (undated) RX ORDER — CALCIUM CHLORIDE 100 MG/ML
INJECTION INTRAVENOUS; INTRAVENTRICULAR
Status: DISPENSED

## (undated) RX ORDER — NALOXONE HYDROCHLORIDE 0.4 MG/ML
INJECTION, SOLUTION INTRAMUSCULAR; INTRAVENOUS; SUBCUTANEOUS
Status: DISPENSED
Start: 2023-07-25

## (undated) RX ORDER — IVABRADINE 5 MG/1
TABLET, FILM COATED ORAL
Status: DISPENSED
Start: 2024-12-13

## (undated) RX ORDER — CEFAZOLIN SODIUM/WATER 2 G/20 ML
SYRINGE (ML) INTRAVENOUS
Status: DISPENSED
Start: 2025-01-20

## (undated) RX ORDER — BUPIVACAINE HYDROCHLORIDE 5 MG/ML
INJECTION, SOLUTION EPIDURAL; INTRACAUDAL
Status: DISPENSED
Start: 2025-01-20

## (undated) RX ORDER — SODIUM CHLORIDE 9 MG/ML
INJECTION, SOLUTION INTRAVENOUS
Status: DISPENSED

## (undated) RX ORDER — FLUMAZENIL 0.1 MG/ML
INJECTION, SOLUTION INTRAVENOUS
Status: DISPENSED
Start: 2023-07-25

## (undated) RX ORDER — ASPIRIN 81 MG/1
TABLET ORAL
Status: DISPENSED
Start: 2025-01-20

## (undated) RX ORDER — VASOPRESSIN 20 [USP'U]/ML
INJECTION, SOLUTION INTRAVENOUS
Status: DISPENSED
Start: 2025-01-20

## (undated) RX ORDER — CEFAZOLIN SODIUM 1 G/3ML
INJECTION, POWDER, FOR SOLUTION INTRAMUSCULAR; INTRAVENOUS
Status: DISPENSED
Start: 2025-01-20

## (undated) RX ORDER — ACETAMINOPHEN 325 MG/1
TABLET ORAL
Status: DISPENSED
Start: 2025-01-20

## (undated) RX ORDER — FAMOTIDINE 20 MG/1
TABLET, FILM COATED ORAL
Status: DISPENSED
Start: 2025-01-20

## (undated) RX ORDER — PROPOFOL 10 MG/ML
INJECTION, EMULSION INTRAVENOUS
Status: DISPENSED
Start: 2025-01-20

## (undated) RX ORDER — FENTANYL CITRATE 0.05 MG/ML
INJECTION, SOLUTION INTRAMUSCULAR; INTRAVENOUS
Status: DISPENSED
Start: 2025-01-20

## (undated) RX ORDER — HEPARIN SODIUM 1000 [USP'U]/ML
INJECTION, SOLUTION INTRAVENOUS; SUBCUTANEOUS
Status: DISPENSED

## (undated) RX ORDER — FENTANYL CITRATE 50 UG/ML
INJECTION, SOLUTION INTRAMUSCULAR; INTRAVENOUS
Status: DISPENSED
Start: 2023-07-25